# Patient Record
Sex: FEMALE | Race: WHITE | Employment: OTHER | URBAN - NONMETROPOLITAN AREA
[De-identification: names, ages, dates, MRNs, and addresses within clinical notes are randomized per-mention and may not be internally consistent; named-entity substitution may affect disease eponyms.]

---

## 2017-03-07 RX ORDER — LOVASTATIN 20 MG/1
TABLET ORAL
Qty: 180 TABLET | Refills: 1 | Status: SHIPPED | OUTPATIENT
Start: 2017-03-07 | End: 2017-06-20

## 2017-03-21 RX ORDER — LISINOPRIL 2.5 MG/1
TABLET ORAL
Qty: 90 TABLET | Refills: 0 | Status: SHIPPED | OUTPATIENT
Start: 2017-03-21 | End: 2017-06-20 | Stop reason: SDUPTHER

## 2017-03-21 RX ORDER — CLOPIDOGREL BISULFATE 75 MG/1
TABLET ORAL
Qty: 90 TABLET | Refills: 0 | Status: SHIPPED | OUTPATIENT
Start: 2017-03-21 | End: 2017-06-20 | Stop reason: SDUPTHER

## 2017-04-06 ENCOUNTER — OFFICE VISIT (OUTPATIENT)
Dept: FAMILY MEDICINE CLINIC | Age: 82
End: 2017-04-06
Payer: MEDICARE

## 2017-04-06 VITALS
HEART RATE: 60 BPM | BODY MASS INDEX: 26.7 KG/M2 | HEIGHT: 60 IN | WEIGHT: 136 LBS | DIASTOLIC BLOOD PRESSURE: 60 MMHG | RESPIRATION RATE: 16 BRPM | SYSTOLIC BLOOD PRESSURE: 100 MMHG

## 2017-04-06 DIAGNOSIS — Z00.00 MEDICARE ANNUAL WELLNESS VISIT, SUBSEQUENT: ICD-10-CM

## 2017-04-06 DIAGNOSIS — R73.01 IMPAIRED FASTING GLUCOSE: ICD-10-CM

## 2017-04-06 DIAGNOSIS — E78.2 MIXED HYPERLIPIDEMIA: ICD-10-CM

## 2017-04-06 DIAGNOSIS — E03.9 ACQUIRED HYPOTHYROIDISM: ICD-10-CM

## 2017-04-06 DIAGNOSIS — N18.30 CHRONIC KIDNEY DISEASE, STAGE 3 (MODERATE): ICD-10-CM

## 2017-04-06 DIAGNOSIS — I73.9 PERIPHERAL VASCULAR DISEASE (HCC): ICD-10-CM

## 2017-04-06 DIAGNOSIS — I10 ESSENTIAL HYPERTENSION: Primary | ICD-10-CM

## 2017-04-06 DIAGNOSIS — R73.03 PREDIABETES: ICD-10-CM

## 2017-04-06 PROBLEM — N18.9 CHRONIC KIDNEY DISEASE: Status: ACTIVE | Noted: 2017-04-06

## 2017-04-06 PROCEDURE — 1036F TOBACCO NON-USER: CPT | Performed by: FAMILY MEDICINE

## 2017-04-06 PROCEDURE — G8400 PT W/DXA NO RESULTS DOC: HCPCS | Performed by: FAMILY MEDICINE

## 2017-04-06 PROCEDURE — G8427 DOCREV CUR MEDS BY ELIG CLIN: HCPCS | Performed by: FAMILY MEDICINE

## 2017-04-06 PROCEDURE — 4040F PNEUMOC VAC/ADMIN/RCVD: CPT | Performed by: FAMILY MEDICINE

## 2017-04-06 PROCEDURE — 99214 OFFICE O/P EST MOD 30 MIN: CPT | Performed by: FAMILY MEDICINE

## 2017-04-06 PROCEDURE — G8598 ASA/ANTIPLAT THER USED: HCPCS | Performed by: FAMILY MEDICINE

## 2017-04-06 PROCEDURE — G0439 PPPS, SUBSEQ VISIT: HCPCS | Performed by: FAMILY MEDICINE

## 2017-04-06 PROCEDURE — G8420 CALC BMI NORM PARAMETERS: HCPCS | Performed by: FAMILY MEDICINE

## 2017-04-06 PROCEDURE — 1123F ACP DISCUSS/DSCN MKR DOCD: CPT | Performed by: FAMILY MEDICINE

## 2017-04-06 PROCEDURE — 1090F PRES/ABSN URINE INCON ASSESS: CPT | Performed by: FAMILY MEDICINE

## 2017-04-06 RX ORDER — METOPROLOL TARTRATE 50 MG/1
TABLET, FILM COATED ORAL
Qty: 180 TABLET | Refills: 1 | Status: SHIPPED | OUTPATIENT
Start: 2017-04-06 | End: 2017-10-06 | Stop reason: SDUPTHER

## 2017-04-06 RX ORDER — LEVOTHYROXINE SODIUM 0.07 MG/1
TABLET ORAL
Qty: 90 TABLET | Refills: 1 | Status: SHIPPED | OUTPATIENT
Start: 2017-04-06 | End: 2017-10-06 | Stop reason: SDUPTHER

## 2017-04-06 ASSESSMENT — ENCOUNTER SYMPTOMS
VOMITING: 0
RHINORRHEA: 0
CONSTIPATION: 0
SHORTNESS OF BREATH: 0
EYE DISCHARGE: 0
EYE REDNESS: 0
ABDOMINAL PAIN: 0
TROUBLE SWALLOWING: 0
DIARRHEA: 0
SINUS PRESSURE: 0
COUGH: 0
NAUSEA: 0
WHEEZING: 0
SORE THROAT: 0

## 2017-04-06 ASSESSMENT — PATIENT HEALTH QUESTIONNAIRE - PHQ9
2. FEELING DOWN, DEPRESSED OR HOPELESS: 0
1. LITTLE INTEREST OR PLEASURE IN DOING THINGS: 0
SUM OF ALL RESPONSES TO PHQ QUESTIONS 1-9: 0
SUM OF ALL RESPONSES TO PHQ9 QUESTIONS 1 & 2: 0

## 2017-05-25 ENCOUNTER — OFFICE VISIT (OUTPATIENT)
Dept: VASCULAR SURGERY | Age: 82
End: 2017-05-25
Payer: MEDICARE

## 2017-05-25 VITALS
DIASTOLIC BLOOD PRESSURE: 88 MMHG | BODY MASS INDEX: 26.93 KG/M2 | WEIGHT: 137.2 LBS | SYSTOLIC BLOOD PRESSURE: 186 MMHG | HEART RATE: 68 BPM | HEIGHT: 60 IN

## 2017-05-25 DIAGNOSIS — I73.9 PERIPHERAL VASCULAR DISEASE (HCC): ICD-10-CM

## 2017-05-25 DIAGNOSIS — I65.23 CAROTID STENOSIS, BILATERAL: Primary | ICD-10-CM

## 2017-05-25 PROCEDURE — 4040F PNEUMOC VAC/ADMIN/RCVD: CPT | Performed by: SURGERY

## 2017-05-25 PROCEDURE — 1123F ACP DISCUSS/DSCN MKR DOCD: CPT | Performed by: SURGERY

## 2017-05-25 PROCEDURE — G8420 CALC BMI NORM PARAMETERS: HCPCS | Performed by: SURGERY

## 2017-05-25 PROCEDURE — 1036F TOBACCO NON-USER: CPT | Performed by: SURGERY

## 2017-05-25 PROCEDURE — G8427 DOCREV CUR MEDS BY ELIG CLIN: HCPCS | Performed by: SURGERY

## 2017-05-25 PROCEDURE — 1090F PRES/ABSN URINE INCON ASSESS: CPT | Performed by: SURGERY

## 2017-05-25 PROCEDURE — G8400 PT W/DXA NO RESULTS DOC: HCPCS | Performed by: SURGERY

## 2017-05-25 PROCEDURE — 99213 OFFICE O/P EST LOW 20 MIN: CPT | Performed by: SURGERY

## 2017-05-25 PROCEDURE — G8598 ASA/ANTIPLAT THER USED: HCPCS | Performed by: SURGERY

## 2017-06-20 RX ORDER — CLOPIDOGREL BISULFATE 75 MG/1
TABLET ORAL
Qty: 90 TABLET | Refills: 1 | Status: SHIPPED | OUTPATIENT
Start: 2017-06-20 | End: 2017-10-06 | Stop reason: SDUPTHER

## 2017-06-20 RX ORDER — LOVASTATIN 20 MG/1
TABLET ORAL
Qty: 180 TABLET | Refills: 0 | Status: SHIPPED | OUTPATIENT
Start: 2017-06-20 | End: 2017-10-06 | Stop reason: SDUPTHER

## 2017-06-20 RX ORDER — LISINOPRIL 2.5 MG/1
TABLET ORAL
Qty: 90 TABLET | Refills: 1 | Status: SHIPPED | OUTPATIENT
Start: 2017-06-20 | End: 2017-10-06 | Stop reason: SDUPTHER

## 2017-09-29 ENCOUNTER — HOSPITAL ENCOUNTER (OUTPATIENT)
Dept: LAB | Age: 82
Setting detail: SPECIMEN
Discharge: HOME | End: 2017-09-29
Payer: MEDICARE

## 2017-09-29 DIAGNOSIS — E78.2 MIXED HYPERLIPIDEMIA: ICD-10-CM

## 2017-09-29 DIAGNOSIS — E03.9 ACQUIRED HYPOTHYROIDISM: ICD-10-CM

## 2017-09-29 DIAGNOSIS — I10 ESSENTIAL HYPERTENSION: ICD-10-CM

## 2017-09-29 DIAGNOSIS — R73.01 IMPAIRED FASTING GLUCOSE: ICD-10-CM

## 2017-09-29 LAB
ABSOLUTE EOS #: 0.3 K/UL (ref 0–0.4)
ABSOLUTE LYMPH #: 2.5 K/UL (ref 1–4.8)
ABSOLUTE MONO #: 0.6 K/UL (ref 0.1–1.2)
ALBUMIN SERPL-MCNC: 4 G/DL (ref 3.5–5.2)
ALBUMIN/GLOBULIN RATIO: 1.1 (ref 1–2.5)
ALP BLD-CCNC: 86 U/L (ref 35–104)
ALT SERPL-CCNC: 41 U/L (ref 5–33)
ANION GAP SERPL CALCULATED.3IONS-SCNC: 14 MMOL/L (ref 9–17)
AST SERPL-CCNC: 54 U/L
BASOPHILS # BLD: 1 % (ref 0–1)
BASOPHILS ABSOLUTE: 0.1 K/UL (ref 0–0.2)
BILIRUB SERPL-MCNC: 0.48 MG/DL (ref 0.3–1.2)
BUN BLDV-MCNC: 22 MG/DL (ref 8–23)
BUN/CREAT BLD: 21 (ref 9–20)
CALCIUM SERPL-MCNC: 9.5 MG/DL (ref 8.6–10.4)
CHLORIDE BLD-SCNC: 106 MMOL/L (ref 98–107)
CHOLESTEROL/HDL RATIO: 2.8
CHOLESTEROL: 162 MG/DL
CO2: 26 MMOL/L (ref 20–31)
CREAT SERPL-MCNC: 1.07 MG/DL (ref 0.5–0.9)
DIFFERENTIAL TYPE: NORMAL
EOSINOPHILS RELATIVE PERCENT: 4 % (ref 1–7)
ESTIMATED AVERAGE GLUCOSE: 100 MG/DL
GFR AFRICAN AMERICAN: 60 ML/MIN
GFR NON-AFRICAN AMERICAN: 49 ML/MIN
GFR SERPL CREATININE-BSD FRML MDRD: ABNORMAL ML/MIN/{1.73_M2}
GFR SERPL CREATININE-BSD FRML MDRD: ABNORMAL ML/MIN/{1.73_M2}
GLUCOSE BLD-MCNC: 101 MG/DL (ref 70–99)
HBA1C MFR BLD: 5.1 % (ref 4.8–5.9)
HCT VFR BLD CALC: 43.8 % (ref 36–46)
HDLC SERPL-MCNC: 58 MG/DL
HEMOGLOBIN: 14.2 G/DL (ref 12–16)
LDL CHOLESTEROL: 83 MG/DL (ref 0–130)
LYMPHOCYTES # BLD: 31 % (ref 16–46)
MCH RBC QN AUTO: 30.3 PG (ref 26–34)
MCHC RBC AUTO-ENTMCNC: 32.5 G/DL (ref 31–37)
MCV RBC AUTO: 93.2 FL (ref 80–100)
MONOCYTES # BLD: 7 % (ref 4–11)
PDW BLD-RTO: 13 % (ref 11–14.5)
PLATELET # BLD: 259 K/UL (ref 140–450)
PLATELET ESTIMATE: NORMAL
PMV BLD AUTO: 8.1 FL (ref 6–12)
POTASSIUM SERPL-SCNC: 4.4 MMOL/L (ref 3.7–5.3)
RBC # BLD: 4.7 M/UL (ref 4–5.2)
RBC # BLD: NORMAL 10*6/UL
SEG NEUTROPHILS: 57 % (ref 43–77)
SEGMENTED NEUTROPHILS ABSOLUTE COUNT: 4.6 K/UL (ref 1.8–7.7)
SODIUM BLD-SCNC: 146 MMOL/L (ref 135–144)
THYROXINE, FREE: 1.59 NG/DL (ref 0.93–1.7)
TOTAL PROTEIN: 7.7 G/DL (ref 6.4–8.3)
TRIGL SERPL-MCNC: 104 MG/DL
TSH SERPL DL<=0.05 MIU/L-ACNC: 0.59 MIU/L (ref 0.3–5)
VLDLC SERPL CALC-MCNC: NORMAL MG/DL (ref 1–30)
WBC # BLD: 8.1 K/UL (ref 3.5–11)
WBC # BLD: NORMAL 10*3/UL

## 2017-09-29 PROCEDURE — 80061 LIPID PANEL: CPT

## 2017-09-29 PROCEDURE — 84439 ASSAY OF FREE THYROXINE: CPT

## 2017-09-29 PROCEDURE — 85025 COMPLETE CBC W/AUTO DIFF WBC: CPT

## 2017-09-29 PROCEDURE — 36415 COLL VENOUS BLD VENIPUNCTURE: CPT

## 2017-09-29 PROCEDURE — 84443 ASSAY THYROID STIM HORMONE: CPT

## 2017-09-29 PROCEDURE — 80053 COMPREHEN METABOLIC PANEL: CPT

## 2017-09-29 PROCEDURE — 83036 HEMOGLOBIN GLYCOSYLATED A1C: CPT

## 2017-10-06 ENCOUNTER — OFFICE VISIT (OUTPATIENT)
Dept: FAMILY MEDICINE CLINIC | Age: 82
End: 2017-10-06
Payer: MEDICARE

## 2017-10-06 VITALS
BODY MASS INDEX: 26.61 KG/M2 | DIASTOLIC BLOOD PRESSURE: 70 MMHG | HEIGHT: 59 IN | SYSTOLIC BLOOD PRESSURE: 130 MMHG | HEART RATE: 64 BPM | WEIGHT: 132 LBS | RESPIRATION RATE: 16 BRPM

## 2017-10-06 DIAGNOSIS — E78.2 MIXED HYPERLIPIDEMIA: ICD-10-CM

## 2017-10-06 DIAGNOSIS — I73.9 PERIPHERAL VASCULAR DISEASE (HCC): ICD-10-CM

## 2017-10-06 DIAGNOSIS — R73.01 IMPAIRED FASTING GLUCOSE: ICD-10-CM

## 2017-10-06 DIAGNOSIS — E03.9 ACQUIRED HYPOTHYROIDISM: ICD-10-CM

## 2017-10-06 DIAGNOSIS — I10 ESSENTIAL HYPERTENSION: Primary | ICD-10-CM

## 2017-10-06 DIAGNOSIS — N18.30 CHRONIC KIDNEY DISEASE, STAGE 3 (MODERATE): ICD-10-CM

## 2017-10-06 PROCEDURE — G8417 CALC BMI ABV UP PARAM F/U: HCPCS | Performed by: FAMILY MEDICINE

## 2017-10-06 PROCEDURE — G8484 FLU IMMUNIZE NO ADMIN: HCPCS | Performed by: FAMILY MEDICINE

## 2017-10-06 PROCEDURE — 99214 OFFICE O/P EST MOD 30 MIN: CPT | Performed by: FAMILY MEDICINE

## 2017-10-06 PROCEDURE — 1090F PRES/ABSN URINE INCON ASSESS: CPT | Performed by: FAMILY MEDICINE

## 2017-10-06 PROCEDURE — G8598 ASA/ANTIPLAT THER USED: HCPCS | Performed by: FAMILY MEDICINE

## 2017-10-06 PROCEDURE — 1036F TOBACCO NON-USER: CPT | Performed by: FAMILY MEDICINE

## 2017-10-06 PROCEDURE — G8400 PT W/DXA NO RESULTS DOC: HCPCS | Performed by: FAMILY MEDICINE

## 2017-10-06 PROCEDURE — 4040F PNEUMOC VAC/ADMIN/RCVD: CPT | Performed by: FAMILY MEDICINE

## 2017-10-06 PROCEDURE — G8427 DOCREV CUR MEDS BY ELIG CLIN: HCPCS | Performed by: FAMILY MEDICINE

## 2017-10-06 PROCEDURE — 1123F ACP DISCUSS/DSCN MKR DOCD: CPT | Performed by: FAMILY MEDICINE

## 2017-10-06 RX ORDER — LOPERAMIDE HYDROCHLORIDE 2 MG/1
2 CAPSULE ORAL 4 TIMES DAILY PRN
COMMUNITY
End: 2019-04-11

## 2017-10-06 RX ORDER — LOVASTATIN 20 MG/1
TABLET ORAL
Qty: 180 TABLET | Refills: 0 | Status: SHIPPED | OUTPATIENT
Start: 2017-10-06 | End: 2018-03-05 | Stop reason: SDUPTHER

## 2017-10-06 RX ORDER — CLOPIDOGREL BISULFATE 75 MG/1
TABLET ORAL
Qty: 90 TABLET | Refills: 1 | Status: SHIPPED | OUTPATIENT
Start: 2017-10-06 | End: 2018-03-05 | Stop reason: SDUPTHER

## 2017-10-06 RX ORDER — METOPROLOL TARTRATE 50 MG/1
TABLET, FILM COATED ORAL
Qty: 180 TABLET | Refills: 1 | Status: SHIPPED | OUTPATIENT
Start: 2017-10-06 | End: 2018-03-05 | Stop reason: SDUPTHER

## 2017-10-06 RX ORDER — LISINOPRIL 2.5 MG/1
TABLET ORAL
Qty: 90 TABLET | Refills: 1 | Status: SHIPPED | OUTPATIENT
Start: 2017-10-06 | End: 2018-03-05 | Stop reason: SDUPTHER

## 2017-10-06 RX ORDER — LEVOTHYROXINE SODIUM 0.07 MG/1
TABLET ORAL
Qty: 90 TABLET | Refills: 1 | Status: SHIPPED | OUTPATIENT
Start: 2017-10-06 | End: 2018-03-05 | Stop reason: SDUPTHER

## 2017-10-06 ASSESSMENT — ENCOUNTER SYMPTOMS
NAUSEA: 0
COUGH: 0
EYE REDNESS: 0
EYE DISCHARGE: 0
RHINORRHEA: 0
TROUBLE SWALLOWING: 0
SORE THROAT: 0
WHEEZING: 0
ABDOMINAL PAIN: 0
CONSTIPATION: 0
SHORTNESS OF BREATH: 0
VOMITING: 0
DIARRHEA: 0
SINUS PRESSURE: 0

## 2017-10-06 NOTE — PATIENT INSTRUCTIONS
Hospital Outpatient Visit on 09/29/2017   Component Date Value Ref Range Status    WBC 09/29/2017 8.1  3.5 - 11.0 k/uL Final    RBC 09/29/2017 4.70  4.0 - 5.2 m/uL Final    Hemoglobin 09/29/2017 14.2  12.0 - 16.0 g/dL Final    Hematocrit 09/29/2017 43.8  36 - 46 % Final    MCV 09/29/2017 93.2  80 - 100 fL Final    MCH 09/29/2017 30.3  26 - 34 pg Final    MCHC 09/29/2017 32.5  31 - 37 g/dL Final    RDW 09/29/2017 13.0  11.0 - 14.5 % Final    Platelets 98/09/8060 259  140 - 450 k/uL Final    MPV 09/29/2017 8.1  6.0 - 12.0 fL Final    Differential Type 09/29/2017 NOT REPORTED   Final    WBC Morphology 09/29/2017 NOT REPORTED   Final    RBC Morphology 09/29/2017 NOT REPORTED   Final    Platelet Estimate 53/[birth year]/1949 NOT REPORTED   Final    Seg Neutrophils 09/29/2017 57  43 - 77 % Final    Lymphocytes 09/29/2017 31  16 - 46 % Final    Monocytes 09/29/2017 7  4 - 11 % Final    Eosinophils % 09/29/2017 4  1 - 7 % Final    Basophils 09/29/2017 1  0 - 1 % Final    Segs Absolute 09/29/2017 4.60  1.8 - 7.7 k/uL Final    Absolute Lymph # 09/29/2017 2.50  1.0 - 4.8 k/uL Final    Absolute Mono # 09/29/2017 0.60  0.1 - 1.2 k/uL Final    Absolute Eos # 09/29/2017 0.30  0.0 - 0.4 k/uL Final    Basophils # 09/29/2017 0.10  0.0 - 0.2 k/uL Final    Comment: Performed at [facility] [address] [address] [phone]      Glucose 09/29/2017 101* 70 - 99 mg/dL Final    BUN 09/29/2017 22  8 - 23 mg/dL Final    CREATININE 09/29/2017 1.07* 0.50 - 0.90 mg/dL Final    Bun/Cre Ratio 09/29/2017 21* 9 - 20 Final    Calcium 09/29/2017 9.5  8.6 - 10.4 mg/dL Final    Sodium 09/29/2017 146* 135 - 144 mmol/L Final    Potassium 09/29/2017 4.4  3.7 - 5.3 mmol/L Final    Chloride 09/29/2017 106  98 - 107 mmol/L Final    CO2 09/29/2017 26  20 - 31 mmol/L Final    Anion Gap 09/29/2017 14  9 - 17 mmol/L Final    Alkaline Phosphatase 09/29/2017 86  35 - 104 U/L Final High     >499   Very high   Based on AHA Guidelines for fasting triglyceride, October 2012. Performed at [facility] [address] [address] [phone]      VLDL 09/29/2017 NOT REPORTED  1 - 30 mg/dL Final    TSH 09/29/2017 0.59  0.30 - 5.00 mIU/L Final    Comment: Performed at [facility] [address] [address] [phone]      Thyroxine, Free 09/29/2017 1.59  0.93 - 1.70 ng/dL Final    Comment: Performed at [facility] [address] [id number] [phone]            Learning About High Blood Pressure  What is high blood pressure? Blood pressure is a measure of how hard the blood pushes against the walls of your arteries. It's normal for blood pressure to go up and down throughout the day, but if it stays up, you have high blood pressure. Another name for high blood pressure is hypertension. Two numbers tell you your blood pressure. The first number is the systolic pressure. It shows how hard the blood pushes when your heart is pumping. The second number is the diastolic pressure. It shows how hard the blood pushes between heartbeats, when your heart is relaxed and filling with blood. A blood pressure of less than 120/80 (say \"120 over 80\") is ideal for an adult. High blood pressure is 140/90 or higher. You have high blood pressure if your top number is 140 or higher or your bottom number is 90 or higher, or both. Many people fall into the category in between, called prehypertension. People with prehypertension need to make lifestyle changes to bring their blood pressure down and help prevent or delay high blood pressure. What happens when you have high blood pressure? · Blood flows through your arteries with too much force. Over time, this damages the walls of your arteries. But you can't feel it.  High blood pressure usually

## 2017-10-06 NOTE — PROGRESS NOTES
Subjective:      Patient ID: Maria G Malave is a [de-identified] y.o. female. HPI  Patient comes in today for follow up of chronic health issues   Chief Complaint   Patient presents with    Hypertension     6 mo f/u; planned visit    Hyperlipidemia     6 mo f/u    Hypothyroidism     6 mo f/u    Blood Sugar Problem     IFG; 6 mo f/u    Chronic Kidney Disease     stage 3; 6 mo f/u    Other     PVD; 6 mo f/u    Discuss Labs     drawn 9/29/17   . Patient Feels relatively well overall. She does not have any acute concerns to discuss today. Her blood pressure is stable and controlled on her current medical therapy. Her hyperlipidemia is stable and controlled on her current statin dose. Blood sugar remains mildly elevated, but her hemoglobin A1c is within normal limits. She does have known chronic kidney disease, but her creatinine actually stable and low but improved compared to last check. Thyroid levels are stable and adequately supplemented on her current thyroid dose. She does have known peripheral vascular disease but is not having any significant symptoms of pain or discomfort in her lower extremities. Otherwise today no other acute medical concerns.   The rest of her labs are essentially normal..  Patient's recent lab reports are as follows:    Results for orders placed or performed during the hospital encounter of 09/29/17   CBC Auto Differential   Result Value Ref Range    WBC 8.1 3.5 - 11.0 k/uL    RBC 4.70 4.0 - 5.2 m/uL    Hemoglobin 14.2 12.0 - 16.0 g/dL    Hematocrit 43.8 36 - 46 %    MCV 93.2 80 - 100 fL    MCH 30.3 26 - 34 pg    MCHC 32.5 31 - 37 g/dL    RDW 13.0 11.0 - 14.5 %    Platelets 100 645 - 121 k/uL    MPV 8.1 6.0 - 12.0 fL    Differential Type NOT REPORTED     WBC Morphology NOT REPORTED     RBC Morphology NOT REPORTED     Platelet Estimate NOT REPORTED     Seg Neutrophils 57 43 - 77 %    Lymphocytes 31 16 - 46 %    Monocytes 7 4 - 11 %    Eosinophils % 4 1 - 7 %    Basophils 1 0 - 1 % Segs Absolute 4.60 1.8 - 7.7 k/uL    Absolute Lymph # 2.50 1.0 - 4.8 k/uL    Absolute Mono # 0.60 0.1 - 1.2 k/uL    Absolute Eos # 0.30 0.0 - 0.4 k/uL    Basophils # 0.10 0.0 - 0.2 k/uL   Comprehensive Metabolic Panel   Result Value Ref Range    Glucose 101 (H) 70 - 99 mg/dL    BUN 22 8 - 23 mg/dL    CREATININE 1.07 (H) 0.50 - 0.90 mg/dL    Bun/Cre Ratio 21 (H) 9 - 20    Calcium 9.5 8.6 - 10.4 mg/dL    Sodium 146 (H) 135 - 144 mmol/L    Potassium 4.4 3.7 - 5.3 mmol/L    Chloride 106 98 - 107 mmol/L    CO2 26 20 - 31 mmol/L    Anion Gap 14 9 - 17 mmol/L    Alkaline Phosphatase 86 35 - 104 U/L    ALT 41 (H) 5 - 33 U/L    AST 54 (H) <32 U/L    Total Bilirubin 0.48 0.3 - 1.2 mg/dL    Total Protein 7.7 6.4 - 8.3 g/dL    Alb 4.0 3.5 - 5.2 g/dL    Albumin/Globulin Ratio 1.1 1.0 - 2.5    GFR Non- 49 (L) >60 mL/min    GFR African American 60 (L) >60 mL/min    GFR Comment          GFR Staging NOT REPORTED    Hemoglobin A1C   Result Value Ref Range    Hemoglobin A1C 5.1 4.8 - 5.9 %    Estimated Avg Glucose 100 mg/dL   Lipid Panel   Result Value Ref Range    Cholesterol 162 <200 mg/dL    HDL 58 >40 mg/dL    LDL Cholesterol 83 0 - 130 mg/dL    Chol/HDL Ratio 2.8 <5    Triglycerides 104 <150 mg/dL    VLDL NOT REPORTED 1 - 30 mg/dL   TSH without Reflex   Result Value Ref Range    TSH 0.59 0.30 - 5.00 mIU/L   T4, Free   Result Value Ref Range    Thyroxine, Free 1.59 0.93 - 1.70 ng/dL     Did discuss dietary modification and increased exercise to keep cholesterol and blood sugar under good control. Other review of systems are as noted below. Did review patient's med list, allergies, social history, fam history, pmhx and pshx today as noted in the record. Preventative measures are reviewed today. See health maintenance section for complete preventative plan of care. Review of Systems   Constitutional: Negative for chills, fatigue and fever.    HENT: Negative for congestion, ear pain, postnasal drip, rhinorrhea, sinus pressure, sore throat and trouble swallowing. Eyes: Negative for discharge and redness. Respiratory: Negative for cough, shortness of breath and wheezing. Cardiovascular: Negative for chest pain. Gastrointestinal: Negative for abdominal pain, constipation, diarrhea, nausea and vomiting. Musculoskeletal: Negative for arthralgias, myalgias and neck pain. Skin: Negative for rash and wound. Allergic/Immunologic: Negative for environmental allergies. Neurological: Negative for dizziness, weakness, light-headedness and headaches. Hematological: Negative for adenopathy. Psychiatric/Behavioral: Negative. Objective:   Physical Exam   Constitutional: She is oriented to person, place, and time. She appears well-developed and well-nourished. No distress. HENT:   Head: Normocephalic and atraumatic. Right Ear: External ear normal.   Left Ear: External ear normal.   Nose: Nose normal.   Mouth/Throat: Oropharynx is clear and moist. No oropharyngeal exudate. Eyes: Conjunctivae and EOM are normal. Pupils are equal, round, and reactive to light. Right eye exhibits no discharge. Left eye exhibits no discharge. Neck: Normal range of motion. Neck supple. No thyromegaly present. Cardiovascular: Normal rate, regular rhythm and normal heart sounds. Pulmonary/Chest: Effort normal and breath sounds normal. She has no wheezes. She has no rales. Abdominal: Soft. Bowel sounds are normal.   Musculoskeletal: She exhibits no edema. Lymphadenopathy:     She has no cervical adenopathy. Neurological: She is alert and oriented to person, place, and time. Skin: Skin is warm and dry. No rash noted. She is not diaphoretic. Psychiatric: She has a normal mood and affect. Her behavior is normal. Judgment and thought content normal.   Nursing note and vitals reviewed. Assessment:      Encounter Diagnoses   Name Primary?     Essential hypertension Yes    Mixed hyperlipidemia     Acquired hypothyroidism     Chronic kidney disease, stage 3 (moderate)     Peripheral vascular disease (HCC)     Impaired fasting glucose            Plan:      Orders Placed This Encounter   Medications    metoprolol tartrate (LOPRESSOR) 50 MG tablet     Sig: TAKE ONE TABLET BY MOUTH TWICE DAILY     Dispense:  180 tablet     Refill:  1    levothyroxine (SYNTHROID) 75 MCG tablet     Sig: TAKE ONE TABLET BY MOUTH ONCE DAILY     Dispense:  90 tablet     Refill:  1    lisinopril (PRINIVIL;ZESTRIL) 2.5 MG tablet     Sig: TAKE ONE TABLET BY MOUTH ONCE DAILY     Dispense:  90 tablet     Refill:  1    clopidogrel (PLAVIX) 75 MG tablet     Sig: TAKE ONE TABLET BY MOUTH ONCE DAILY     Dispense:  90 tablet     Refill:  1    lovastatin (MEVACOR) 20 MG tablet     Sig: TAKE TWO TABLETS BY MOUTH NIGHTLY     Dispense:  180 tablet     Refill:  0    metFORMIN (GLUCOPHAGE) 500 MG tablet     Sig: TAKE ONE TABLET BY MOUTH ONCE DAILY WITH BREAKFAST     Dispense:  90 tablet     Refill:  1     Orders Placed This Encounter   Procedures    CBC Auto Differential     To be done in 6 months     Standing Status:   Future     Standing Expiration Date:   10/6/2018    Comprehensive Metabolic Panel     To be done in 6 months     Standing Status:   Future     Standing Expiration Date:   10/6/2018    Lipid Panel     To be done in 6 months     Standing Status:   Future     Standing Expiration Date:   10/6/2018     Order Specific Question:   Is Patient Fasting?/# of Hours     Answer:   12 hours    Hemoglobin A1C     To be done in 6 months     Standing Status:   Future     Standing Expiration Date:   10/6/2018    TSH without Reflex     To be done in 6 months     Standing Status:   Future     Standing Expiration Date:   10/6/2018    T4, Free     To be done in 6 months     Standing Status:   Future     Standing Expiration Date:   10/6/2018     Patient is to continue on her current medical regimen.   No additional changes are made at this time.    Patient is to follow a low-carb/low sugar diet and increase exercise for optimal blood sugar and cholesterol control. Patient is to return to my office in 6 months duration or sooner if any further problems or symptoms arise.     (Please note that portions of this note were completed with a voice recognition program. Efforts were made to edit the dictations but occasionally words are mis-transcribed.)

## 2017-10-06 NOTE — MR AVS SNAPSHOT
After Visit Summary             Victor Manuel Pedroza   10/6/2017 9:40 AM   Office Visit    Description:  Female : 1935   Provider:  Eric Sinclair DO   Department:  [address] and Future Appointments         Below is a list of your follow-up and future appointments. This may not be a complete list as you may have made appointments directly with providers that we are not aware of or your providers may have made some for you. Please call your providers to confirm appointments. It is important to keep your appointments. Please bring your current insurance card, photo ID, co-pay, and all medication bottles to your appointment. If self-pay, payment is expected at the time of service. Your To-Do List     Future Appointments Provider Department Dept Phone    2018 8:15 AM SCHEDULE, [facility]  Laboratory [phone]    If this is a fasting lab, please do not eat or drink past midnight other than water. 2018 10:40 AM Eric Sinclair DO [facility] [phone]    Future Orders Complete By Expires    CBC Auto Differential [TFJ3277 Custom]  2018 10/6/2018    Comments: To be done in 6 months    Comprehensive Metabolic Panel [VZD07 Custom]  2018 10/6/2018    Comments: To be done in 6 months    Hemoglobin A1C [LAB90 Custom]  2018 10/6/2018    Comments: To be done in 6 months    Lipid Panel [LAB18 Custom]  2018 10/6/2018    Comments: To be done in 6 months    T4, Free [GZE659 Custom]  2018 10/6/2018    Comments: To be done in 6 months    TSH without Reflex [[id number] Custom]  2018 10/6/2018    Comments: To be done in 6 months    Follow-Up    Return in about 6 months (around 2018).          Information from Your Visit        Department     Name Address Phone Fax    [address] [place] [phone]      You Were Seen for:         Comments Essential hypertension   [270411]         Vital Signs     Blood Pressure Pulse Respirations Height Weight Last Menstrual Period    130/70 (Site: Left Arm, Position: Sitting, Cuff Size: Medium Adult) 64 16 4' 11\" (1.499 m) 132 lb (59.9 kg) 01/01/1972    Body Mass Index Smoking Status                26.66 kg/m2 Former Smoker          Additional Information about your Body Mass Index (BMI)           Your BMI as listed above is considered overweight (25.0-29.9). BMI is an estimate of body fat, calculated from your height and weight. The higher your BMI, the greater your risk of heart disease, high blood pressure, type 2 diabetes, stroke, gallstones, arthritis, sleep apnea, and certain cancers. BMI is not perfect. It may overestimate body fat in athletes and people who are more muscular. If your body fat is high you can improve your BMI by decreasing your calorie intake and becoming more physically active.      Learn more at: [employer] Command.co.uk          [facility]    Hospital Outpatient Visit on 09/29/2017   Component Date Value Ref Range Status    WBC 09/29/2017 8.1  3.5 - 11.0 k/uL Final    RBC 09/29/2017 4.70  4.0 - 5.2 m/uL Final    Hemoglobin 09/29/2017 14.2  12.0 - 16.0 g/dL Final    Hematocrit 09/29/2017 43.8  36 - 46 % Final    MCV 09/29/2017 93.2  80 - 100 fL Final    MCH 09/29/2017 30.3  26 - 34 pg Final    MCHC 09/29/2017 32.5  31 - 37 g/dL Final    RDW 09/29/2017 13.0  11.0 - 14.5 % Final    Platelets 91/91/9339 259  140 - 450 k/uL Final    MPV 09/29/2017 8.1  6.0 - 12.0 fL Final    Differential Type 09/29/2017 NOT REPORTED   Final    WBC Morphology 09/29/2017 NOT REPORTED   Final    RBC Morphology 09/29/2017 NOT REPORTED   Final    Platelet Estimate 42/38/3752 NOT REPORTED   Final    Seg Neutrophils 09/29/2017 57  43 - 77 % Final    Lymphocytes 09/29/2017 31  16 - 46 % Final    Monocytes 09/29/2017 7  4 - 11 % Final  GFR Staging 09/29/2017 NOT REPORTED   Final    Hemoglobin A1C 09/29/2017 5.1  4.8 - 5.9 % Final    Estimated Avg Glucose 09/29/2017 100  mg/dL Final    Comment: Performed at [facility] [address] [id number] [phone]      Cholesterol 09/29/2017 162  <200 mg/dL Final    Comment:    Cholesterol Guidelines:      <200  Desirable   200-240  Borderline      >240  Undesirable         HDL 09/29/2017 58  >40 mg/dL Final    Comment:    HDL Guidelines:    <40     Undesirable   40-59    Borderline    >59     Desirable         LDL Cholesterol 09/29/2017 83  0 - 130 mg/dL Final    Comment:    LDL Guidelines:     <100    Desirable   100-129   Near to/above Desirable   130-159   Borderline      >159   Undesirable     Direct (measured) LDL and calculated LDL are not interchangeable tests.  Chol/HDL Ratio 09/29/2017 2.8  <5 Final            Triglycerides 09/29/2017 104  <150 mg/dL Final    Comment:    Triglyceride Guidelines:     <150   Desirable   150-199  Borderline   200-499  High     >499   Very high   Based on AHA Guidelines for fasting triglyceride, October 2012. Performed at [facility] [address] [address] [phone]      VLDL 09/29/2017 NOT REPORTED  1 - 30 mg/dL Final    TSH 09/29/2017 0.59  0.30 - 5.00 mIU/L Final    Comment: Performed at [facility] [address] [address] [phone]      Thyroxine, Free 09/29/2017 1.59  0.93 - 1.70 ng/dL Final    Comment: Performed at [facility] [address] [address] [phone]            Learning About High Blood Pressure  What is high blood pressure? Blood pressure is a measure of how hard the blood pushes against the walls of your arteries.  It's normal for blood pressure to go up and down ¨ Flavor your food with garlic, lemon juice, onion, vinegar, herbs, and spices instead of salt. Do not use soy sauce, steak sauce, onion salt, garlic salt, mustard, or ketchup on your food. ¨ Use less salt (or none) when recipes call for it. You can often use half the salt a recipe calls for without losing flavor. · Be physically active. Get at least 30 minutes of exercise on most days of the week. Walking is a good choice. You also may want to do other activities, such as running, swimming, cycling, or playing tennis or team sports. · Limit alcohol to 2 drinks a day for men and 1 drink a day for women. · Eat plenty of fruits, vegetables, and low-fat dairy products. Eat less saturated and total fats. How is high blood pressure treated? · Your doctor will suggest making lifestyle changes. For example, your doctor may ask you to eat healthy foods, quit smoking, lose extra weight, and be more active. · If lifestyle changes don't help enough or your blood pressure is very high, you will have to take medicine every day. Follow-up care is a key part of your treatment and safety. Be sure to make and go to all appointments, and call your doctor if you are having problems. It's also a good idea to know your test results and keep a list of the medicines you take. Where can you learn more? Go to [email or web address] and sign in to your [employer] account. Enter P501 in the [facility] box to learn more about \"Learning About High Blood Pressure. \"     If you do not have an account, please click on the \"Sign Up Now\" link. Current as of: March 23, 2016  Content Version: 11.3  © 3260-0823 [employer], [employer]. Care instructions adapted under license by [facility] ([facility]). If you have questions about a medical condition or this instruction, always ask your healthcare professional. [address] any warranty or liability for your use of this information. Today's Medication Changes          These changes are accurate as of: 10/6/17 10:50 AM.  If you have any questions, ask your nurse or doctor. CHANGE how you take these medications           clopidogrel 75 MG tablet   Commonly known as:  PLAVIX   Instructions:  TAKE ONE TABLET BY MOUTH ONCE DAILY   Quantity:  90 tablet   Refills:  1   What changed:  See the new instructions. Changed by:  Lisandro Aldrich DO       lisinopril 2.5 MG tablet   Commonly known as:  PRINIVIL;ZESTRIL   Instructions:  TAKE ONE TABLET BY MOUTH ONCE DAILY   Quantity:  90 tablet   Refills:  1   What changed:  See the new instructions. Changed by:  Lisandro Aldrich DO       lovastatin 20 MG tablet   Commonly known as:  MEVACOR   Instructions:  TAKE TWO TABLETS BY MOUTH NIGHTLY   Quantity:  180 tablet   Refills:  0   What changed:  See the new instructions. Changed by:  Lisandro Aldrich DO       metFORMIN 500 MG tablet   Commonly known as:  GLUCOPHAGE   Instructions:  TAKE ONE TABLET BY MOUTH ONCE DAILY WITH BREAKFAST   Quantity:  90 tablet   Refills:  1   What changed:  See the new instructions.    Changed by:  Lisandro Aldrich DO            Where to Get Your Medications      These medications were sent to [address] [place] [address] F [phone] [address], [address] [facility] [address]     Phone:  [phone]     clopidogrel 75 MG tablet    levothyroxine 75 MCG tablet    lisinopril 2.5 MG tablet    lovastatin 20 MG tablet    metFORMIN 500 MG tablet    metoprolol tartrate 50 MG tablet               Your Current Medications Are              loperamide (IMODIUM) 2 MG capsule Take 2 mg by mouth 4 times daily as needed for Diarrhea    metoprolol tartrate (LOPRESSOR) 50 MG tablet TAKE ONE TABLET BY MOUTH TWICE DAILY    levothyroxine (SYNTHROID) 75 MCG tablet TAKE ONE TABLET BY MOUTH ONCE DAILY lisinopril (PRINIVIL;ZESTRIL) 2.5 MG tablet TAKE ONE TABLET BY MOUTH ONCE DAILY    clopidogrel (PLAVIX) 75 MG tablet TAKE ONE TABLET BY MOUTH ONCE DAILY    lovastatin (MEVACOR) 20 MG tablet TAKE TWO TABLETS BY MOUTH NIGHTLY    metFORMIN (GLUCOPHAGE) 500 MG tablet TAKE ONE TABLET BY MOUTH ONCE DAILY WITH BREAKFAST    Tetrahydroz-Dextran-PEG-Povid (EYE DROPS ADVANCED RELIEF OP) Place 1 drop into both eyes 2 times daily    Calcium Carbonate-Vitamin D (CALCIUM 600 + D PO) Take 1 tablet by mouth 2 times daily. aspirin 325 MG EC tablet Take 325 mg by mouth daily. Multiple Vitamins-Minerals (MULTIVITAMIN PO) Take 1 tablet by mouth daily. Multivitamin with iron      Allergies              Other     She did have an IV antibiotic while she was in the hospital that she had a rash from, but son is unsure of the name of that. Otherwise, she has no known allergies. Additional Information        Basic Information     Date Of Birth Sex Race Ethnicity Preferred Language    1935 Female White Non-/Non  English      Problem List as of 10/6/2017  Date Reviewed: 10/6/2017                Impaired fasting glucose    Chronic kidney disease    Peripheral vascular disease ([facility] [address]    Medicare annual wellness visit, subsequent    Carotid stenosis, bilateral    Cerebral infarction ([facility] [address]    Prediabetes    HTN (hypertension)    Hyperlipidemia    Hypothyroidism      Your Goals as of 10/6/2017 at 10:49 AM              Today    5/25/17 4/6/17       Blood Pressure    Blood Pressure < 140/90 (pt-stated)   130/70  186/88  100/60    Notes    Patient Self-Management Goal for Health Maintenance  Goal: I will follow a healthy diet as discussed by my provider.   Barriers: none  Plan for overcoming my barriers: N/A  Confidence: 8/10  Anticipated Goal Completion Date: 6 mo         Immunizations as of 10/6/2017     Name Date    Pneumococcal 13-valent Conjugate Steffen Arango) 10/10/2014 Pneumococcal Polysaccharide ([id number]) 10/10/2013      Preventive Care        Date Due    Tetanus Combination Vaccine (1 - Tdap) 3/6/1954    Yearly Flu Vaccine (1) 9/1/2017    Mammograms are recommended every 2 years for low/average risk patients aged 48 - 69, and every year for high risk patients per updated national guidelines. However these guidelines can be individualized by your provider. 10/8/2017            MyChart Signup           [employer] allows you to send messages to your doctor, view your test results, renew your prescriptions, schedule appointments, view visit notes, and more. How Do I Sign Up? 1. In your Internet browser, go to [email or web address]  2. Click on the Sign Up Now link in the Sign In box. You will see the New Member Sign Up page. 3. Enter your Start[employer] Access Code exactly as it appears below. You will not need to use this code after youve completed the sign-up process. If you do not sign up before the expiration date, you must request a new code. Starteed Access Code: [email or web address]  Expires: 12/5/2017 10:50 AM    4. Enter your Social Security Number ([id number]) and Date of Birth (mm/dd/yyyy) as indicated and click Submit. You will be taken to the next sign-up page. 5. Create a Starteed ID. This will be your Starteed login ID and cannot be changed, so think of one that is secure and easy to remember. 6. Create a Starteed password. You can change your password at any time. 7. Enter your Password Reset Question and Answer. This can be used at a later time if you forget your password. 8. Enter your e-mail address. You will receive e-mail notification when new information is available in [address] 9. Click Sign Up. You can now view your medical record. Additional Information  If you have questions, please contact the physician practice where you receive care. Remember, Starteed is NOT to be used for urgent needs. For medical emergencies, dial 911.

## 2018-03-04 RX ORDER — LOVASTATIN 20 MG/1
TABLET ORAL
Qty: 180 TABLET | Refills: 0 | Status: CANCELLED | OUTPATIENT
Start: 2018-03-04

## 2018-03-05 RX ORDER — METOPROLOL TARTRATE 50 MG/1
TABLET, FILM COATED ORAL
Qty: 180 TABLET | Refills: 1 | Status: SHIPPED | OUTPATIENT
Start: 2018-03-05 | End: 2018-10-10 | Stop reason: SDUPTHER

## 2018-03-05 RX ORDER — LEVOTHYROXINE SODIUM 0.07 MG/1
TABLET ORAL
Qty: 90 TABLET | Refills: 1 | Status: SHIPPED | OUTPATIENT
Start: 2018-03-05 | End: 2018-10-10 | Stop reason: SDUPTHER

## 2018-03-05 RX ORDER — LISINOPRIL 2.5 MG/1
TABLET ORAL
Qty: 90 TABLET | Refills: 1 | Status: SHIPPED | OUTPATIENT
Start: 2018-03-05 | End: 2018-10-10 | Stop reason: SDUPTHER

## 2018-03-05 RX ORDER — CLOPIDOGREL BISULFATE 75 MG/1
TABLET ORAL
Qty: 90 TABLET | Refills: 1 | Status: SHIPPED | OUTPATIENT
Start: 2018-03-05 | End: 2018-10-10 | Stop reason: SDUPTHER

## 2018-03-05 RX ORDER — LOVASTATIN 20 MG/1
TABLET ORAL
Qty: 180 TABLET | Refills: 0 | Status: SHIPPED | OUTPATIENT
Start: 2018-03-05 | End: 2018-06-01 | Stop reason: SDUPTHER

## 2018-03-05 NOTE — TELEPHONE ENCOUNTER
Diannia Phoenix called requesting a refill of the below medication which has been pended for you:     Requested Prescriptions     Pending Prescriptions Disp Refills    metoprolol tartrate (LOPRESSOR) 50 MG tablet 180 tablet 1     Sig: TAKE ONE TABLET BY MOUTH TWICE DAILY    levothyroxine (SYNTHROID) 75 MCG tablet 90 tablet 1     Sig: TAKE ONE TABLET BY MOUTH ONCE DAILY    lisinopril (PRINIVIL;ZESTRIL) 2.5 MG tablet 90 tablet 1     Sig: TAKE ONE TABLET BY MOUTH ONCE DAILY    clopidogrel (PLAVIX) 75 MG tablet 90 tablet 1     Sig: TAKE ONE TABLET BY MOUTH ONCE DAILY    lovastatin (MEVACOR) 20 MG tablet 180 tablet 0     Sig: TAKE TWO TABLETS BY MOUTH NIGHTLY    metFORMIN (GLUCOPHAGE) 500 MG tablet 90 tablet 1     Sig: TAKE ONE TABLET BY MOUTH ONCE DAILY WITH BREAKFAST       Last Appointment Date: 10/6/2017  Next Appointment Date: 4/62018    Allergies   Allergen Reactions    Other      She did have an IV antibiotic while she was in the hospital that she had a rash from, but son is unsure of the name of that. Otherwise, she has no known allergies.

## 2018-04-02 ENCOUNTER — HOSPITAL ENCOUNTER (OUTPATIENT)
Dept: LAB | Age: 83
Setting detail: SPECIMEN
Discharge: HOME | End: 2018-04-02
Payer: MEDICARE

## 2018-04-02 DIAGNOSIS — R73.01 IMPAIRED FASTING GLUCOSE: ICD-10-CM

## 2018-04-02 DIAGNOSIS — E03.9 ACQUIRED HYPOTHYROIDISM: ICD-10-CM

## 2018-04-02 DIAGNOSIS — I10 ESSENTIAL HYPERTENSION: ICD-10-CM

## 2018-04-02 DIAGNOSIS — E78.2 MIXED HYPERLIPIDEMIA: ICD-10-CM

## 2018-04-02 LAB
ABSOLUTE EOS #: 0.4 K/UL (ref 0–0.4)
ABSOLUTE IMMATURE GRANULOCYTE: NORMAL K/UL (ref 0–0.3)
ABSOLUTE LYMPH #: 2.7 K/UL (ref 1–4.8)
ABSOLUTE MONO #: 0.7 K/UL (ref 0.1–1.2)
ALBUMIN SERPL-MCNC: 4 G/DL (ref 3.5–5.2)
ALBUMIN/GLOBULIN RATIO: 1 (ref 1–2.5)
ALP BLD-CCNC: 92 U/L (ref 35–104)
ALT SERPL-CCNC: 41 U/L (ref 5–33)
ANION GAP SERPL CALCULATED.3IONS-SCNC: 12 MMOL/L (ref 9–17)
AST SERPL-CCNC: 53 U/L
BASOPHILS # BLD: 1 % (ref 0–1)
BASOPHILS ABSOLUTE: 0.1 K/UL (ref 0–0.2)
BILIRUB SERPL-MCNC: 0.41 MG/DL (ref 0.3–1.2)
BUN BLDV-MCNC: 33 MG/DL (ref 8–23)
BUN/CREAT BLD: 26 (ref 9–20)
CALCIUM SERPL-MCNC: 9.6 MG/DL (ref 8.6–10.4)
CHLORIDE BLD-SCNC: 104 MMOL/L (ref 98–107)
CHOLESTEROL/HDL RATIO: 3
CHOLESTEROL: 175 MG/DL
CO2: 28 MMOL/L (ref 20–31)
CREAT SERPL-MCNC: 1.25 MG/DL (ref 0.5–0.9)
DIFFERENTIAL TYPE: NORMAL
EOSINOPHILS RELATIVE PERCENT: 5 % (ref 1–7)
ESTIMATED AVERAGE GLUCOSE: 100 MG/DL
GFR AFRICAN AMERICAN: 50 ML/MIN
GFR NON-AFRICAN AMERICAN: 41 ML/MIN
GFR SERPL CREATININE-BSD FRML MDRD: ABNORMAL ML/MIN/{1.73_M2}
GFR SERPL CREATININE-BSD FRML MDRD: ABNORMAL ML/MIN/{1.73_M2}
GLUCOSE BLD-MCNC: 94 MG/DL (ref 70–99)
HBA1C MFR BLD: 5.1 % (ref 4.8–5.9)
HCT VFR BLD CALC: 44.9 % (ref 36–46)
HDLC SERPL-MCNC: 59 MG/DL
HEMOGLOBIN: 15 G/DL (ref 12–16)
IMMATURE GRANULOCYTES: NORMAL %
LDL CHOLESTEROL: 97 MG/DL (ref 0–130)
LYMPHOCYTES # BLD: 29 % (ref 16–46)
MCH RBC QN AUTO: 30.9 PG (ref 26–34)
MCHC RBC AUTO-ENTMCNC: 33.4 G/DL (ref 31–37)
MCV RBC AUTO: 92.6 FL (ref 80–100)
MONOCYTES # BLD: 8 % (ref 4–11)
NRBC AUTOMATED: NORMAL PER 100 WBC
PDW BLD-RTO: 13.3 % (ref 11–14.5)
PLATELET # BLD: 246 K/UL (ref 140–450)
PLATELET ESTIMATE: NORMAL
PMV BLD AUTO: 8 FL (ref 6–12)
POTASSIUM SERPL-SCNC: 4.2 MMOL/L (ref 3.7–5.3)
RBC # BLD: 4.85 M/UL (ref 4–5.2)
RBC # BLD: NORMAL 10*6/UL
SEG NEUTROPHILS: 57 % (ref 43–77)
SEGMENTED NEUTROPHILS ABSOLUTE COUNT: 5.4 K/UL (ref 1.8–7.7)
SODIUM BLD-SCNC: 144 MMOL/L (ref 135–144)
THYROXINE, FREE: 1.68 NG/DL (ref 0.93–1.7)
TOTAL PROTEIN: 7.9 G/DL (ref 6.4–8.3)
TRIGL SERPL-MCNC: 97 MG/DL
TSH SERPL DL<=0.05 MIU/L-ACNC: 0.4 MIU/L (ref 0.3–5)
VLDLC SERPL CALC-MCNC: NORMAL MG/DL (ref 1–30)
WBC # BLD: 9.3 K/UL (ref 3.5–11)
WBC # BLD: NORMAL 10*3/UL

## 2018-04-02 PROCEDURE — 80053 COMPREHEN METABOLIC PANEL: CPT

## 2018-04-02 PROCEDURE — 84443 ASSAY THYROID STIM HORMONE: CPT

## 2018-04-02 PROCEDURE — 83036 HEMOGLOBIN GLYCOSYLATED A1C: CPT

## 2018-04-02 PROCEDURE — 80061 LIPID PANEL: CPT

## 2018-04-02 PROCEDURE — 36415 COLL VENOUS BLD VENIPUNCTURE: CPT

## 2018-04-02 PROCEDURE — 84439 ASSAY OF FREE THYROXINE: CPT

## 2018-04-02 PROCEDURE — 85025 COMPLETE CBC W/AUTO DIFF WBC: CPT

## 2018-04-06 ENCOUNTER — OFFICE VISIT (OUTPATIENT)
Dept: FAMILY MEDICINE CLINIC | Age: 83
End: 2018-04-06
Payer: MEDICARE

## 2018-04-06 VITALS
HEIGHT: 59 IN | WEIGHT: 129 LBS | DIASTOLIC BLOOD PRESSURE: 70 MMHG | HEART RATE: 64 BPM | SYSTOLIC BLOOD PRESSURE: 120 MMHG | BODY MASS INDEX: 26 KG/M2 | RESPIRATION RATE: 16 BRPM

## 2018-04-06 DIAGNOSIS — E78.2 MIXED HYPERLIPIDEMIA: ICD-10-CM

## 2018-04-06 DIAGNOSIS — E03.9 ACQUIRED HYPOTHYROIDISM: ICD-10-CM

## 2018-04-06 DIAGNOSIS — I10 ESSENTIAL HYPERTENSION: ICD-10-CM

## 2018-04-06 DIAGNOSIS — I63.9 CEREBRAL INFARCTION, UNSPECIFIED MECHANISM (HCC): ICD-10-CM

## 2018-04-06 DIAGNOSIS — R73.01 IMPAIRED FASTING GLUCOSE: ICD-10-CM

## 2018-04-06 DIAGNOSIS — R19.7 DIARRHEA, UNSPECIFIED TYPE: ICD-10-CM

## 2018-04-06 DIAGNOSIS — I73.9 PERIPHERAL VASCULAR DISEASE (HCC): ICD-10-CM

## 2018-04-06 DIAGNOSIS — R79.89 ABNORMAL LFTS: Primary | ICD-10-CM

## 2018-04-06 PROCEDURE — G8427 DOCREV CUR MEDS BY ELIG CLIN: HCPCS | Performed by: FAMILY MEDICINE

## 2018-04-06 PROCEDURE — 1036F TOBACCO NON-USER: CPT | Performed by: FAMILY MEDICINE

## 2018-04-06 PROCEDURE — 1123F ACP DISCUSS/DSCN MKR DOCD: CPT | Performed by: FAMILY MEDICINE

## 2018-04-06 PROCEDURE — 4040F PNEUMOC VAC/ADMIN/RCVD: CPT | Performed by: FAMILY MEDICINE

## 2018-04-06 PROCEDURE — G8598 ASA/ANTIPLAT THER USED: HCPCS | Performed by: FAMILY MEDICINE

## 2018-04-06 PROCEDURE — 99214 OFFICE O/P EST MOD 30 MIN: CPT | Performed by: FAMILY MEDICINE

## 2018-04-06 PROCEDURE — G8400 PT W/DXA NO RESULTS DOC: HCPCS | Performed by: FAMILY MEDICINE

## 2018-04-06 PROCEDURE — G8417 CALC BMI ABV UP PARAM F/U: HCPCS | Performed by: FAMILY MEDICINE

## 2018-04-06 PROCEDURE — 1090F PRES/ABSN URINE INCON ASSESS: CPT | Performed by: FAMILY MEDICINE

## 2018-04-06 ASSESSMENT — PATIENT HEALTH QUESTIONNAIRE - PHQ9: SUM OF ALL RESPONSES TO PHQ QUESTIONS 1-9: 0

## 2018-04-06 ASSESSMENT — ANXIETY QUESTIONNAIRES: GAD7 TOTAL SCORE: 1

## 2018-04-08 ASSESSMENT — ENCOUNTER SYMPTOMS
DIARRHEA: 0
RHINORRHEA: 0
SHORTNESS OF BREATH: 0
SORE THROAT: 0
CONSTIPATION: 0
COUGH: 0
NAUSEA: 0
VOMITING: 0
EYE DISCHARGE: 0
EYE REDNESS: 0
ABDOMINAL PAIN: 0
TROUBLE SWALLOWING: 0
WHEEZING: 0
SINUS PRESSURE: 0

## 2018-04-26 ENCOUNTER — TELEPHONE (OUTPATIENT)
Dept: PRIMARY CARE CLINIC | Age: 83
End: 2018-04-26

## 2018-05-15 ENCOUNTER — ANTI-COAG VISIT (OUTPATIENT)
Dept: FAMILY MEDICINE CLINIC | Age: 83
End: 2018-05-15

## 2018-05-25 ENCOUNTER — TELEPHONE (OUTPATIENT)
Dept: FAMILY MEDICINE CLINIC | Age: 83
End: 2018-05-25

## 2018-06-01 RX ORDER — LOVASTATIN 20 MG/1
TABLET ORAL
Qty: 180 TABLET | Refills: 1 | Status: SHIPPED | OUTPATIENT
Start: 2018-06-01 | End: 2018-10-10 | Stop reason: SDUPTHER

## 2018-09-14 RX ORDER — LOVASTATIN 20 MG/1
TABLET ORAL
Qty: 180 TABLET | Refills: 1 | OUTPATIENT
Start: 2018-09-14

## 2018-09-14 NOTE — TELEPHONE ENCOUNTER
Chele Chatman called requesting a refill of the below medication which has been pended for you: patient just got 90 day supply 9/1/2018. Will decline and see what labs look like in Oct before refilling    Requested Prescriptions     Pending Prescriptions Disp Refills    lovastatin (MEVACOR) 20 MG tablet [Pharmacy Med Name: LOVASTATIN 20MG     TAB] 180 tablet 1     Sig: TAKE 2 TABLETS BY MOUTH NIGHTLY       Last Appointment Date: 4/6/2018  Next Appointment Date: 10/10/2018    Allergies   Allergen Reactions    Other      She did have an IV antibiotic while she was in the hospital that she had a rash from, but son is unsure of the name of that. Otherwise, she has no known allergies.

## 2018-10-03 ENCOUNTER — HOSPITAL ENCOUNTER (OUTPATIENT)
Dept: LAB | Age: 83
Setting detail: SPECIMEN
Discharge: HOME | End: 2018-10-03
Payer: MEDICARE

## 2018-10-03 DIAGNOSIS — R73.01 IMPAIRED FASTING GLUCOSE: ICD-10-CM

## 2018-10-03 DIAGNOSIS — I10 ESSENTIAL HYPERTENSION: ICD-10-CM

## 2018-10-03 DIAGNOSIS — E78.2 MIXED HYPERLIPIDEMIA: ICD-10-CM

## 2018-10-03 DIAGNOSIS — E03.9 ACQUIRED HYPOTHYROIDISM: ICD-10-CM

## 2018-10-03 LAB
ABSOLUTE EOS #: 0.3 K/UL (ref 0–0.4)
ABSOLUTE IMMATURE GRANULOCYTE: NORMAL K/UL (ref 0–0.3)
ABSOLUTE LYMPH #: 2.4 K/UL (ref 1–4.8)
ABSOLUTE MONO #: 0.7 K/UL (ref 0.1–1.2)
ALBUMIN SERPL-MCNC: 4.1 G/DL (ref 3.5–5.2)
ALBUMIN/GLOBULIN RATIO: 1.1 (ref 1–2.5)
ALP BLD-CCNC: 97 U/L (ref 35–104)
ALT SERPL-CCNC: 44 U/L (ref 5–33)
ANION GAP SERPL CALCULATED.3IONS-SCNC: 9 MMOL/L (ref 9–17)
AST SERPL-CCNC: 55 U/L
BASOPHILS # BLD: 1 % (ref 0–1)
BASOPHILS ABSOLUTE: 0.1 K/UL (ref 0–0.2)
BILIRUB SERPL-MCNC: 0.52 MG/DL (ref 0.3–1.2)
BUN BLDV-MCNC: 28 MG/DL (ref 8–23)
BUN/CREAT BLD: 24 (ref 9–20)
CALCIUM SERPL-MCNC: 9.6 MG/DL (ref 8.6–10.4)
CHLORIDE BLD-SCNC: 104 MMOL/L (ref 98–107)
CHOLESTEROL/HDL RATIO: 2.7
CHOLESTEROL: 166 MG/DL
CO2: 29 MMOL/L (ref 20–31)
CREAT SERPL-MCNC: 1.17 MG/DL (ref 0.5–0.9)
DIFFERENTIAL TYPE: NORMAL
EOSINOPHILS RELATIVE PERCENT: 4 % (ref 1–7)
ESTIMATED AVERAGE GLUCOSE: 100 MG/DL
GFR AFRICAN AMERICAN: 54 ML/MIN
GFR NON-AFRICAN AMERICAN: 44 ML/MIN
GFR SERPL CREATININE-BSD FRML MDRD: ABNORMAL ML/MIN/{1.73_M2}
GFR SERPL CREATININE-BSD FRML MDRD: ABNORMAL ML/MIN/{1.73_M2}
GLUCOSE BLD-MCNC: 100 MG/DL (ref 70–99)
HBA1C MFR BLD: 5.1 % (ref 4.8–5.9)
HCT VFR BLD CALC: 44.8 % (ref 36–46)
HDLC SERPL-MCNC: 61 MG/DL
HEMOGLOBIN: 14.5 G/DL (ref 12–16)
IMMATURE GRANULOCYTES: NORMAL %
LDL CHOLESTEROL: 84 MG/DL (ref 0–130)
LYMPHOCYTES # BLD: 30 % (ref 16–46)
MCH RBC QN AUTO: 30.4 PG (ref 26–34)
MCHC RBC AUTO-ENTMCNC: 32.4 G/DL (ref 31–37)
MCV RBC AUTO: 94 FL (ref 80–100)
MONOCYTES # BLD: 8 % (ref 4–11)
NRBC AUTOMATED: NORMAL PER 100 WBC
PDW BLD-RTO: 13.3 % (ref 11–14.5)
PLATELET # BLD: 278 K/UL (ref 140–450)
PLATELET ESTIMATE: NORMAL
PMV BLD AUTO: 7.9 FL (ref 6–12)
POTASSIUM SERPL-SCNC: 4.3 MMOL/L (ref 3.7–5.3)
RBC # BLD: 4.77 M/UL (ref 4–5.2)
RBC # BLD: NORMAL 10*6/UL
SEG NEUTROPHILS: 57 % (ref 43–77)
SEGMENTED NEUTROPHILS ABSOLUTE COUNT: 4.6 K/UL (ref 1.8–7.7)
SODIUM BLD-SCNC: 142 MMOL/L (ref 135–144)
THYROXINE, FREE: 1.75 NG/DL (ref 0.93–1.7)
TOTAL PROTEIN: 8 G/DL (ref 6.4–8.3)
TRIGL SERPL-MCNC: 103 MG/DL
TSH SERPL DL<=0.05 MIU/L-ACNC: 0.39 MIU/L (ref 0.3–5)
VLDLC SERPL CALC-MCNC: NORMAL MG/DL (ref 1–30)
WBC # BLD: 8.2 K/UL (ref 3.5–11)
WBC # BLD: NORMAL 10*3/UL

## 2018-10-03 PROCEDURE — 80061 LIPID PANEL: CPT

## 2018-10-03 PROCEDURE — 36415 COLL VENOUS BLD VENIPUNCTURE: CPT

## 2018-10-03 PROCEDURE — 83036 HEMOGLOBIN GLYCOSYLATED A1C: CPT

## 2018-10-03 PROCEDURE — 85025 COMPLETE CBC W/AUTO DIFF WBC: CPT

## 2018-10-03 PROCEDURE — 80053 COMPREHEN METABOLIC PANEL: CPT

## 2018-10-03 PROCEDURE — 84439 ASSAY OF FREE THYROXINE: CPT

## 2018-10-03 PROCEDURE — 84443 ASSAY THYROID STIM HORMONE: CPT

## 2018-10-10 ENCOUNTER — OFFICE VISIT (OUTPATIENT)
Dept: FAMILY MEDICINE CLINIC | Age: 83
End: 2018-10-10
Payer: MEDICARE

## 2018-10-10 VITALS
RESPIRATION RATE: 16 BRPM | HEIGHT: 59 IN | HEART RATE: 72 BPM | DIASTOLIC BLOOD PRESSURE: 70 MMHG | WEIGHT: 121 LBS | BODY MASS INDEX: 24.39 KG/M2 | SYSTOLIC BLOOD PRESSURE: 130 MMHG

## 2018-10-10 DIAGNOSIS — R73.01 IMPAIRED FASTING GLUCOSE: ICD-10-CM

## 2018-10-10 DIAGNOSIS — N32.81 OVERACTIVE BLADDER: ICD-10-CM

## 2018-10-10 DIAGNOSIS — N18.30 STAGE 3 CHRONIC KIDNEY DISEASE (HCC): ICD-10-CM

## 2018-10-10 DIAGNOSIS — E03.9 ACQUIRED HYPOTHYROIDISM: ICD-10-CM

## 2018-10-10 DIAGNOSIS — I10 ESSENTIAL HYPERTENSION: Primary | ICD-10-CM

## 2018-10-10 DIAGNOSIS — E78.2 MIXED HYPERLIPIDEMIA: ICD-10-CM

## 2018-10-10 PROCEDURE — G8598 ASA/ANTIPLAT THER USED: HCPCS | Performed by: FAMILY MEDICINE

## 2018-10-10 PROCEDURE — 1101F PT FALLS ASSESS-DOCD LE1/YR: CPT | Performed by: FAMILY MEDICINE

## 2018-10-10 PROCEDURE — 1036F TOBACCO NON-USER: CPT | Performed by: FAMILY MEDICINE

## 2018-10-10 PROCEDURE — 1123F ACP DISCUSS/DSCN MKR DOCD: CPT | Performed by: FAMILY MEDICINE

## 2018-10-10 PROCEDURE — G8400 PT W/DXA NO RESULTS DOC: HCPCS | Performed by: FAMILY MEDICINE

## 2018-10-10 PROCEDURE — 99214 OFFICE O/P EST MOD 30 MIN: CPT | Performed by: FAMILY MEDICINE

## 2018-10-10 PROCEDURE — 4040F PNEUMOC VAC/ADMIN/RCVD: CPT | Performed by: FAMILY MEDICINE

## 2018-10-10 PROCEDURE — G8420 CALC BMI NORM PARAMETERS: HCPCS | Performed by: FAMILY MEDICINE

## 2018-10-10 PROCEDURE — 1090F PRES/ABSN URINE INCON ASSESS: CPT | Performed by: FAMILY MEDICINE

## 2018-10-10 PROCEDURE — G8484 FLU IMMUNIZE NO ADMIN: HCPCS | Performed by: FAMILY MEDICINE

## 2018-10-10 PROCEDURE — G8427 DOCREV CUR MEDS BY ELIG CLIN: HCPCS | Performed by: FAMILY MEDICINE

## 2018-10-10 RX ORDER — CLOPIDOGREL BISULFATE 75 MG/1
TABLET ORAL
Qty: 90 TABLET | Refills: 1 | Status: SHIPPED | OUTPATIENT
Start: 2018-10-10 | End: 2019-04-11 | Stop reason: SDUPTHER

## 2018-10-10 RX ORDER — LISINOPRIL 2.5 MG/1
TABLET ORAL
Qty: 90 TABLET | Refills: 1 | Status: SHIPPED | OUTPATIENT
Start: 2018-10-10 | End: 2019-04-11 | Stop reason: SDUPTHER

## 2018-10-10 RX ORDER — OXYBUTYNIN CHLORIDE 10 MG/1
10 TABLET, EXTENDED RELEASE ORAL DAILY
Qty: 30 TABLET | Refills: 5 | Status: SHIPPED | OUTPATIENT
Start: 2018-10-10 | End: 2019-04-11 | Stop reason: SDUPTHER

## 2018-10-10 RX ORDER — LOVASTATIN 20 MG/1
TABLET ORAL
Qty: 180 TABLET | Refills: 1 | Status: SHIPPED | OUTPATIENT
Start: 2018-10-10 | End: 2019-04-11 | Stop reason: SDUPTHER

## 2018-10-10 RX ORDER — LEVOTHYROXINE SODIUM 0.07 MG/1
TABLET ORAL
Qty: 90 TABLET | Refills: 1 | Status: SHIPPED | OUTPATIENT
Start: 2018-10-10 | End: 2019-04-11 | Stop reason: SDUPTHER

## 2018-10-10 RX ORDER — METOPROLOL TARTRATE 50 MG/1
TABLET, FILM COATED ORAL
Qty: 180 TABLET | Refills: 1 | Status: SHIPPED | OUTPATIENT
Start: 2018-10-10 | End: 2019-04-11 | Stop reason: SDUPTHER

## 2018-10-10 ASSESSMENT — PATIENT HEALTH QUESTIONNAIRE - PHQ9
2. FEELING DOWN, DEPRESSED OR HOPELESS: 0
SUM OF ALL RESPONSES TO PHQ QUESTIONS 1-9: 0
SUM OF ALL RESPONSES TO PHQ QUESTIONS 1-9: 0
1. LITTLE INTEREST OR PLEASURE IN DOING THINGS: 0
SUM OF ALL RESPONSES TO PHQ9 QUESTIONS 1 & 2: 0

## 2018-10-10 ASSESSMENT — ENCOUNTER SYMPTOMS
EYE DISCHARGE: 0
VOMITING: 0
SORE THROAT: 0
WHEEZING: 0
RHINORRHEA: 0
SINUS PRESSURE: 0
CONSTIPATION: 0
EYE REDNESS: 0
DIARRHEA: 0
TROUBLE SWALLOWING: 0
COUGH: 0
SHORTNESS OF BREATH: 0
ABDOMINAL PAIN: 0
NAUSEA: 0

## 2018-10-10 NOTE — PROGRESS NOTES
Gal Epps received counseling on the following healthy behaviors: nutrition and exercise  Reviewed prior labs and health maintenance  Continue current medications, diet and exercise. Discussed use, benefit, and side effects of prescribed medications. Barriers to medication compliance addressed. Patient given educational materials - see patient instructions  Was a self-tracking handout given in paper form or via [employer]? Yes    Requested Prescriptions     Pending Prescriptions Disp Refills    metFORMIN (GLUCOPHAGE) 500 MG tablet 90 tablet 1     Sig: TAKE ONE TABLET BY MOUTH ONCE DAILY WITH BREAKFAST    clopidogrel (PLAVIX) 75 MG tablet 90 tablet 1     Sig: TAKE ONE TABLET BY MOUTH ONCE DAILY    lisinopril (PRINIVIL;ZESTRIL) 2.5 MG tablet 90 tablet 1     Sig: TAKE ONE TABLET BY MOUTH ONCE DAILY    levothyroxine (SYNTHROID) 75 MCG tablet 90 tablet 1     Sig: TAKE ONE TABLET BY MOUTH ONCE DAILY    metoprolol tartrate (LOPRESSOR) 50 MG tablet 180 tablet 1     Sig: TAKE ONE TABLET BY MOUTH TWICE DAILY    lovastatin (MEVACOR) 20 MG tablet 180 tablet 1     Sig: TAKE 2 TABLETS BY MOUTH NIGHTLY       All patient questions answered. Patient voiced understanding. Quality Measures    Body mass index is 24.44 kg/m². Normal. Weight control planned discussed Healthy diet and regular exercise. BP: 130/70. Blood pressure is normal. Treatment plan consists of No treatment change needed. Fall Risk 4/6/2018 4/6/2017 4/7/2016 4/9/2015 4/11/2014   2 or more falls in past year? no no no no no   Fall with injury in past year? no no no no no     The patient does not have a history of falls. I did not - not indicated , complete a risk assessment for falls.  A plan of care for falls No Treatment plan indicated    Lab Results   Component Value Date    LDLCHOLESTEROL 84 10/03/2018    (goal LDL reduction with dx if diabetes is 50% LDL reduction)    PHQ Scores 10/10/2018 4/6/2018 4/6/2017 10/6/2016 4/9/2015 4/11/2014   PHQ2 Score 0

## 2018-10-10 NOTE — PROGRESS NOTES
Patient declines flu, tdap and shingles vaccines; declines mammogram screening at this time. Declines myChart activation.

## 2018-10-10 NOTE — PATIENT INSTRUCTIONS
Hospital Outpatient Visit on 10/03/2018   Component Date Value Ref Range Status    WBC 10/03/2018 8.2  3.5 - 11.0 k/uL Final    RBC 10/03/2018 4.77  4.0 - 5.2 m/uL Final    Hemoglobin 10/03/2018 14.5  12.0 - 16.0 g/dL Final    Hematocrit 10/03/2018 44.8  36 - 46 % Final    MCV 10/03/2018 94.0  80 - 100 fL Final    MCH 10/03/2018 30.4  26 - 34 pg Final    MCHC 10/03/2018 32.4  31 - 37 g/dL Final    RDW 10/03/2018 13.3  11.0 - 14.5 % Final    Platelets 39/28/0406 278  140 - 450 k/uL Final    MPV 10/03/2018 7.9  6.0 - 12.0 fL Final    NRBC Automated 10/03/2018 NOT REPORTED  per 100 WBC Final    Differential Type 10/03/2018 NOT REPORTED   Final    Immature Granulocytes 10/03/2018 NOT REPORTED  0 % Final    Absolute Immature Granulocyte 10/03/2018 NOT REPORTED  0.00 - 0.30 k/uL Final    WBC Morphology 10/03/2018 NOT REPORTED   Final    RBC Morphology 10/03/2018 NOT REPORTED   Final    Platelet Estimate 12/54/9720 NOT REPORTED   Final    Seg Neutrophils 10/03/2018 57  43 - 77 % Final    Lymphocytes 10/03/2018 30  16 - 46 % Final    Monocytes 10/03/2018 8  4 - 11 % Final    Eosinophils % 10/03/2018 4  1 - 7 % Final    Basophils 10/03/2018 1  0 - 1 % Final    Segs Absolute 10/03/2018 4.60  1.8 - 7.7 k/uL Final    Absolute Lymph # 10/03/2018 2.40  1.0 - 4.8 k/uL Final    Absolute Mono # 10/03/2018 0.70  0.1 - 1.2 k/uL Final    Absolute Eos # 10/03/2018 0.30  0.0 - 0.4 k/uL Final    Basophils # 10/03/2018 0.10  0.0 - 0.2 k/uL Final    Glucose 10/03/2018 100* 70 - 99 mg/dL Final    BUN 10/03/2018 28* 8 - 23 mg/dL Final    CREATININE 10/03/2018 1.17* 0.50 - 0.90 mg/dL Final    Bun/Cre Ratio 10/03/2018 24* 9 - 20 Final    Calcium 10/03/2018 9.6  8.6 - 10.4 mg/dL Final    Sodium 10/03/2018 142  135 - 144 mmol/L Final    Potassium 10/03/2018 4.3  3.7 - 5.3 mmol/L Final    Chloride 10/03/2018 104  98 - 107 mmol/L Final    CO2 10/03/2018 29  20 - 31 mmol/L Final    Anion Gap 10/03/2018 9  9 - 17 mmol/L Final    Alkaline Phosphatase 10/03/2018 97  35 - 104 U/L Final    ALT 10/03/2018 44* 5 - 33 U/L Final    AST 10/03/2018 55* <32 U/L Final    Total Bilirubin 10/03/2018 0.52  0.3 - 1.2 mg/dL Final    Total Protein 10/03/2018 8.0  6.4 - 8.3 g/dL Final    Alb 10/03/2018 4.1  3.5 - 5.2 g/dL Final    Albumin/Globulin Ratio 10/03/2018 1.1  1.0 - 2.5 Final    GFR Non- 10/03/2018 44* >60 mL/min Final    GFR  10/03/2018 54* >60 mL/min Final    GFR Comment 10/03/2018        Final    Comment: Average GFR for 79or more years old:   76 mL/min/1.73sq m  Chronic Kidney Disease:   <60 mL/min/1.73sq m  Kidney failure:   <15 mL/min/1.73sq m              eGFR calculated using average adult body mass. Additional eGFR calculator   available at:        Buyou.br            GFR Staging 10/03/2018 NOT REPORTED   Final    Hemoglobin A1C 10/03/2018 5.1  4.8 - 5.9 % Final    Estimated Avg Glucose 10/03/2018 100  mg/dL Final    Cholesterol 10/03/2018 166  <200 mg/dL Final    Comment:    Cholesterol Guidelines:      <200  Desirable   200-240  Borderline      >240  Undesirable         HDL 10/03/2018 61  >40 mg/dL Final    Comment:    HDL Guidelines:    <40     Undesirable   40-59    Borderline    >59     Desirable         LDL Cholesterol 10/03/2018 84  0 - 130 mg/dL Final    Comment:    LDL Guidelines:     <100    Desirable   100-129   Near to/above Desirable   130-159   Borderline      >159   Undesirable     Direct (measured) LDL and calculated LDL are not interchangeable tests.  Chol/HDL Ratio 10/03/2018 2.7  <5 Final            Triglycerides 10/03/2018 103  <150 mg/dL Final    Comment:    Triglyceride Guidelines:     <150   Desirable   150-199  Borderline   200-499  High     >499   Very high   Based on AHA Guidelines for fasting triglyceride, October 2012.          VLDL 10/03/2018 NOT REPORTED  1 - 30 mg/dL Final    TSH 10/03/2018 0.39  0.30

## 2019-04-04 ENCOUNTER — HOSPITAL ENCOUNTER (OUTPATIENT)
Dept: LAB | Age: 84
Discharge: HOME | End: 2019-04-04
Payer: MEDICARE

## 2019-04-04 ENCOUNTER — TELEPHONE (OUTPATIENT)
Dept: FAMILY MEDICINE CLINIC | Age: 84
End: 2019-04-04

## 2019-04-04 DIAGNOSIS — E78.2 MIXED HYPERLIPIDEMIA: ICD-10-CM

## 2019-04-04 DIAGNOSIS — Z86.73 HISTORY OF CVA (CEREBROVASCULAR ACCIDENT): ICD-10-CM

## 2019-04-04 DIAGNOSIS — R73.01 IMPAIRED FASTING GLUCOSE: ICD-10-CM

## 2019-04-04 DIAGNOSIS — E03.9 ACQUIRED HYPOTHYROIDISM: ICD-10-CM

## 2019-04-04 DIAGNOSIS — R47.81 SLURRED SPEECH: Primary | ICD-10-CM

## 2019-04-04 DIAGNOSIS — I10 ESSENTIAL HYPERTENSION: ICD-10-CM

## 2019-04-04 LAB
ABSOLUTE EOS #: 0.4 K/UL (ref 0–0.4)
ABSOLUTE IMMATURE GRANULOCYTE: NORMAL K/UL (ref 0–0.3)
ABSOLUTE LYMPH #: 2 K/UL (ref 1–4.8)
ABSOLUTE MONO #: 0.5 K/UL (ref 0.1–1.2)
ALBUMIN SERPL-MCNC: 3.9 G/DL (ref 3.5–5.2)
ALBUMIN/GLOBULIN RATIO: 1.1 (ref 1–2.5)
ALP BLD-CCNC: 84 U/L (ref 35–104)
ALT SERPL-CCNC: 33 U/L (ref 5–33)
ANION GAP SERPL CALCULATED.3IONS-SCNC: 8 MMOL/L (ref 9–17)
AST SERPL-CCNC: 57 U/L
BASOPHILS # BLD: 1 % (ref 0–1)
BASOPHILS ABSOLUTE: 0.1 K/UL (ref 0–0.2)
BILIRUB SERPL-MCNC: 0.47 MG/DL (ref 0.3–1.2)
BUN BLDV-MCNC: 25 MG/DL (ref 8–23)
BUN/CREAT BLD: 21 (ref 9–20)
CALCIUM SERPL-MCNC: 9.4 MG/DL (ref 8.6–10.4)
CHLORIDE BLD-SCNC: 107 MMOL/L (ref 98–107)
CHOLESTEROL/HDL RATIO: 2.7
CHOLESTEROL: 150 MG/DL
CO2: 29 MMOL/L (ref 20–31)
CREAT SERPL-MCNC: 1.19 MG/DL (ref 0.5–0.9)
DIFFERENTIAL TYPE: NORMAL
EOSINOPHILS RELATIVE PERCENT: 5 % (ref 1–7)
ESTIMATED AVERAGE GLUCOSE: 94 MG/DL
GFR AFRICAN AMERICAN: 53 ML/MIN
GFR NON-AFRICAN AMERICAN: 43 ML/MIN
GFR SERPL CREATININE-BSD FRML MDRD: ABNORMAL ML/MIN/{1.73_M2}
GFR SERPL CREATININE-BSD FRML MDRD: ABNORMAL ML/MIN/{1.73_M2}
GLUCOSE BLD-MCNC: 96 MG/DL (ref 70–99)
HBA1C MFR BLD: 4.9 % (ref 4.8–5.9)
HCT VFR BLD CALC: 45.4 % (ref 36–46)
HDLC SERPL-MCNC: 55 MG/DL
HEMOGLOBIN: 14.9 G/DL (ref 12–16)
IMMATURE GRANULOCYTES: NORMAL %
LDL CHOLESTEROL: 77 MG/DL (ref 0–130)
LYMPHOCYTES # BLD: 27 % (ref 16–46)
MCH RBC QN AUTO: 30.3 PG (ref 26–34)
MCHC RBC AUTO-ENTMCNC: 32.8 G/DL (ref 31–37)
MCV RBC AUTO: 92.5 FL (ref 80–100)
MONOCYTES # BLD: 7 % (ref 4–11)
NRBC AUTOMATED: NORMAL PER 100 WBC
PDW BLD-RTO: 13.4 % (ref 11–14.5)
PLATELET # BLD: 248 K/UL (ref 140–450)
PLATELET ESTIMATE: NORMAL
PMV BLD AUTO: 7.8 FL (ref 6–12)
POTASSIUM SERPL-SCNC: 4.8 MMOL/L (ref 3.7–5.3)
RBC # BLD: 4.91 M/UL (ref 4–5.2)
RBC # BLD: NORMAL 10*6/UL
SEG NEUTROPHILS: 60 % (ref 43–77)
SEGMENTED NEUTROPHILS ABSOLUTE COUNT: 4.6 K/UL (ref 1.8–7.7)
SODIUM BLD-SCNC: 144 MMOL/L (ref 135–144)
THYROXINE, FREE: 1.67 NG/DL (ref 0.93–1.7)
TOTAL PROTEIN: 7.3 G/DL (ref 6.4–8.3)
TRIGL SERPL-MCNC: 91 MG/DL
TSH SERPL DL<=0.05 MIU/L-ACNC: 0.97 MIU/L (ref 0.3–5)
VLDLC SERPL CALC-MCNC: NORMAL MG/DL (ref 1–30)
WBC # BLD: 7.6 K/UL (ref 3.5–11)
WBC # BLD: NORMAL 10*3/UL

## 2019-04-04 PROCEDURE — 36415 COLL VENOUS BLD VENIPUNCTURE: CPT

## 2019-04-04 PROCEDURE — 84443 ASSAY THYROID STIM HORMONE: CPT

## 2019-04-04 PROCEDURE — 80061 LIPID PANEL: CPT

## 2019-04-04 PROCEDURE — 83036 HEMOGLOBIN GLYCOSYLATED A1C: CPT

## 2019-04-04 PROCEDURE — 84439 ASSAY OF FREE THYROXINE: CPT

## 2019-04-04 PROCEDURE — 80053 COMPREHEN METABOLIC PANEL: CPT

## 2019-04-04 PROCEDURE — 85025 COMPLETE CBC W/AUTO DIFF WBC: CPT

## 2019-04-04 NOTE — TELEPHONE ENCOUNTER
Son made aware. His mom has done a MRI previously so he would like to proceed with this. Order placed and patients son transferred to scheduling. Creat was up is it ok to still do contrast with MRI?

## 2019-04-04 NOTE — TELEPHONE ENCOUNTER
Patient's son came to the window stating that the patient is coming in next week to see Dr. Theresa Prabhakar but he's noticed that her speech becomes a little slurred when she gets tired. He wanted to give Dr. Theresa Prabhakar a heads up and see if they needed to do anything before the appointment. Patient stated that she's not having anything other symptoms just slurring when tired. Please advise.

## 2019-04-04 NOTE — TELEPHONE ENCOUNTER
Option would be to repeat imaging of the brain to rule out underlying evidence of CVA or other etiology that could contribute to change in speech. Would probably advise MRI brain with and without contrast if able to tolerate MRI. If not could opt for CT brain instead.

## 2019-04-11 ENCOUNTER — OFFICE VISIT (OUTPATIENT)
Dept: FAMILY MEDICINE CLINIC | Age: 84
End: 2019-04-11
Payer: MEDICARE

## 2019-04-11 ENCOUNTER — HOSPITAL ENCOUNTER (OUTPATIENT)
Dept: MRI IMAGING | Age: 84
Discharge: HOME | End: 2019-04-13
Payer: MEDICARE

## 2019-04-11 ENCOUNTER — TELEPHONE (OUTPATIENT)
Dept: FAMILY MEDICINE CLINIC | Age: 84
End: 2019-04-11

## 2019-04-11 VITALS
RESPIRATION RATE: 16 BRPM | SYSTOLIC BLOOD PRESSURE: 138 MMHG | HEIGHT: 59 IN | WEIGHT: 113 LBS | DIASTOLIC BLOOD PRESSURE: 64 MMHG | HEART RATE: 64 BPM | BODY MASS INDEX: 22.78 KG/M2

## 2019-04-11 DIAGNOSIS — I63.89 OTHER CEREBRAL INFARCTION (HCC): ICD-10-CM

## 2019-04-11 DIAGNOSIS — Z86.73 HISTORY OF CVA (CEREBROVASCULAR ACCIDENT): ICD-10-CM

## 2019-04-11 DIAGNOSIS — I10 ESSENTIAL HYPERTENSION: ICD-10-CM

## 2019-04-11 DIAGNOSIS — E03.9 ACQUIRED HYPOTHYROIDISM: ICD-10-CM

## 2019-04-11 DIAGNOSIS — I63.30 CEREBROVASCULAR ACCIDENT (CVA) DUE TO THROMBOSIS OF CEREBRAL ARTERY (HCC): Primary | ICD-10-CM

## 2019-04-11 DIAGNOSIS — E78.2 MIXED HYPERLIPIDEMIA: ICD-10-CM

## 2019-04-11 DIAGNOSIS — Z00.00 MEDICARE ANNUAL WELLNESS VISIT, SUBSEQUENT: ICD-10-CM

## 2019-04-11 DIAGNOSIS — I73.9 PERIPHERAL VASCULAR DISEASE (HCC): ICD-10-CM

## 2019-04-11 DIAGNOSIS — N18.30 STAGE 3 CHRONIC KIDNEY DISEASE (HCC): ICD-10-CM

## 2019-04-11 DIAGNOSIS — R47.81 SLURRED SPEECH: ICD-10-CM

## 2019-04-11 DIAGNOSIS — R73.01 IMPAIRED FASTING GLUCOSE: ICD-10-CM

## 2019-04-11 PROCEDURE — G8400 PT W/DXA NO RESULTS DOC: HCPCS | Performed by: FAMILY MEDICINE

## 2019-04-11 PROCEDURE — G0439 PPPS, SUBSEQ VISIT: HCPCS | Performed by: FAMILY MEDICINE

## 2019-04-11 PROCEDURE — 93000 ELECTROCARDIOGRAM COMPLETE: CPT | Performed by: FAMILY MEDICINE

## 2019-04-11 PROCEDURE — 70553 MRI BRAIN STEM W/O & W/DYE: CPT

## 2019-04-11 PROCEDURE — 99214 OFFICE O/P EST MOD 30 MIN: CPT | Performed by: FAMILY MEDICINE

## 2019-04-11 PROCEDURE — 4040F PNEUMOC VAC/ADMIN/RCVD: CPT | Performed by: FAMILY MEDICINE

## 2019-04-11 PROCEDURE — 1123F ACP DISCUSS/DSCN MKR DOCD: CPT | Performed by: FAMILY MEDICINE

## 2019-04-11 PROCEDURE — G8598 ASA/ANTIPLAT THER USED: HCPCS | Performed by: FAMILY MEDICINE

## 2019-04-11 PROCEDURE — 6360000004 HC RX CONTRAST MEDICATION: Performed by: FAMILY MEDICINE

## 2019-04-11 PROCEDURE — 1036F TOBACCO NON-USER: CPT | Performed by: FAMILY MEDICINE

## 2019-04-11 PROCEDURE — G8427 DOCREV CUR MEDS BY ELIG CLIN: HCPCS | Performed by: FAMILY MEDICINE

## 2019-04-11 PROCEDURE — A9579 GAD-BASE MR CONTRAST NOS,1ML: HCPCS | Performed by: FAMILY MEDICINE

## 2019-04-11 PROCEDURE — 1090F PRES/ABSN URINE INCON ASSESS: CPT | Performed by: FAMILY MEDICINE

## 2019-04-11 PROCEDURE — G8420 CALC BMI NORM PARAMETERS: HCPCS | Performed by: FAMILY MEDICINE

## 2019-04-11 RX ORDER — METOPROLOL TARTRATE 50 MG/1
TABLET, FILM COATED ORAL
Qty: 180 TABLET | Refills: 1 | Status: SHIPPED | OUTPATIENT
Start: 2019-04-11 | End: 2019-10-16 | Stop reason: SDUPTHER

## 2019-04-11 RX ORDER — LEVOTHYROXINE SODIUM 0.07 MG/1
TABLET ORAL
Qty: 90 TABLET | Refills: 1 | Status: SHIPPED | OUTPATIENT
Start: 2019-04-11 | End: 2019-10-16 | Stop reason: SDUPTHER

## 2019-04-11 RX ORDER — LISINOPRIL 2.5 MG/1
TABLET ORAL
Qty: 90 TABLET | Refills: 1 | Status: SHIPPED | OUTPATIENT
Start: 2019-04-11 | End: 2019-10-16 | Stop reason: SDUPTHER

## 2019-04-11 RX ORDER — CLOPIDOGREL BISULFATE 75 MG/1
TABLET ORAL
Qty: 90 TABLET | Refills: 1 | Status: SHIPPED | OUTPATIENT
Start: 2019-04-11 | End: 2019-10-16 | Stop reason: SDUPTHER

## 2019-04-11 RX ORDER — OXYBUTYNIN CHLORIDE 10 MG/1
10 TABLET, EXTENDED RELEASE ORAL DAILY
Qty: 90 TABLET | Refills: 1 | Status: SHIPPED | OUTPATIENT
Start: 2019-04-11 | End: 2019-10-16 | Stop reason: SDUPTHER

## 2019-04-11 RX ORDER — LOVASTATIN 20 MG/1
TABLET ORAL
Qty: 180 TABLET | Refills: 1 | Status: SHIPPED | OUTPATIENT
Start: 2019-04-11 | End: 2019-10-16 | Stop reason: SDUPTHER

## 2019-04-11 RX ADMIN — GADOTERIDOL 6 ML: 279.3 INJECTION, SOLUTION INTRAVENOUS at 10:14

## 2019-04-11 ASSESSMENT — LIFESTYLE VARIABLES: HOW OFTEN DO YOU HAVE A DRINK CONTAINING ALCOHOL: 0

## 2019-04-11 ASSESSMENT — PATIENT HEALTH QUESTIONNAIRE - PHQ9
SUM OF ALL RESPONSES TO PHQ QUESTIONS 1-9: 0
SUM OF ALL RESPONSES TO PHQ QUESTIONS 1-9: 0

## 2019-04-11 ASSESSMENT — ANXIETY QUESTIONNAIRES: GAD7 TOTAL SCORE: 0

## 2019-04-11 NOTE — Clinical Note
We did not schedule patient back for 6 month follow up and I didn't put lab orders in when she was here because we were trying to coordinate her neurologic testing. I did put orders in and she will need a 6 month follow up for follow up of her chronic conditions as well.

## 2019-04-11 NOTE — PATIENT INSTRUCTIONS
Hospital Outpatient Visit on 04/04/2019   Component Date Value Ref Range Status    Thyroxine, Free 04/04/2019 1.67  0.93 - 1.70 ng/dL Final    TSH 04/04/2019 0.97  0.30 - 5.00 mIU/L Final    Cholesterol 04/04/2019 150  <200 mg/dL Final    Comment:    Cholesterol Guidelines:      <200  Desirable   200-240  Borderline      >240  Undesirable         HDL 04/04/2019 55  >40 mg/dL Final    Comment:    HDL Guidelines:    <40     Undesirable   40-59    Borderline    >59     Desirable         LDL Cholesterol 04/04/2019 77  0 - 130 mg/dL Final    Comment:    LDL Guidelines:     <100    Desirable   100-129   Near to/above Desirable   130-159   Borderline      >159   Undesirable     Direct (measured) LDL and calculated LDL are not interchangeable tests.  Chol/HDL Ratio 04/04/2019 2.7  <5 Final            Triglycerides 04/04/2019 91  <150 mg/dL Final    Comment:    Triglyceride Guidelines:     <150   Desirable   150-199  Borderline   200-499  High     >499   Very high   Based on AHA Guidelines for fasting triglyceride, October 2012.          VLDL 04/04/2019 NOT REPORTED  1 - 30 mg/dL Final    Hemoglobin A1C 04/04/2019 4.9  4.8 - 5.9 % Final    Estimated Avg Glucose 04/04/2019 94  mg/dL Final    Glucose 04/04/2019 96  70 - 99 mg/dL Final    BUN 04/04/2019 25* 8 - 23 mg/dL Final    CREATININE 04/04/2019 1.19* 0.50 - 0.90 mg/dL Final    Bun/Cre Ratio 04/04/2019 21* 9 - 20 Final    Calcium 04/04/2019 9.4  8.6 - 10.4 mg/dL Final    Sodium 04/04/2019 144  135 - 144 mmol/L Final    Potassium 04/04/2019 4.8  3.7 - 5.3 mmol/L Final    Chloride 04/04/2019 107  98 - 107 mmol/L Final    CO2 04/04/2019 29  20 - 31 mmol/L Final    Anion Gap 04/04/2019 8* 9 - 17 mmol/L Final    Alkaline Phosphatase 04/04/2019 84  35 - 104 U/L Final    ALT 04/04/2019 33  5 - 33 U/L Final    AST 04/04/2019 57* <32 U/L Final    Total Bilirubin 04/04/2019 0.47  0.3 - 1.2 mg/dL Final    Total Protein 04/04/2019 7.3  6.4 - 8.3 g/dL Final  Alb 04/04/2019 3.9  3.5 - 5.2 g/dL Final    Albumin/Globulin Ratio 04/04/2019 1.1  1.0 - 2.5 Final    GFR Non- 04/04/2019 43* >60 mL/min Final    GFR  04/04/2019 53* >60 mL/min Final    GFR Comment 04/04/2019        Final    Comment: Average GFR for 79or more years old:   76 mL/min/1.73sq m  Chronic Kidney Disease:   <60 mL/min/1.73sq m  Kidney failure:   <15 mL/min/1.73sq m              eGFR calculated using average adult body mass.  Additional eGFR calculator available at:        [email or web address]            GFR Staging 04/04/2019 NOT REPORTED   Final    WBC 04/04/2019 7.6  3.5 - 11.0 k/uL Final    RBC 04/04/2019 4.91  4.0 - 5.2 m/uL Final    Hemoglobin 04/04/2019 14.9  12.0 - 16.0 g/dL Final    Hematocrit 04/04/2019 45.4  36 - 46 % Final    MCV 04/04/2019 92.5  80 - 100 fL Final    MCH 04/04/2019 30.3  26 - 34 pg Final    MCHC 04/04/2019 32.8  31 - 37 g/dL Final    RDW 04/04/2019 13.4  11.0 - 14.5 % Final    Platelets 19/05/4055 248  140 - 450 k/uL Final    MPV 04/04/2019 7.8  6.0 - 12.0 fL Final    NRBC Automated 04/04/2019 NOT REPORTED  per 100 WBC Final    Differential Type 04/04/2019 NOT REPORTED   Final    Immature Granulocytes 04/04/2019 NOT REPORTED  0 % Final    Absolute Immature Granulocyte 04/04/2019 NOT REPORTED  0.00 - 0.30 k/uL Final    WBC Morphology 04/04/2019 NOT REPORTED   Final    RBC Morphology 04/04/2019 NOT REPORTED   Final    Platelet Estimate 26/05/7994 NOT REPORTED   Final    Seg Neutrophils 04/04/2019 60  43 - 77 % Final    Lymphocytes 04/04/2019 27  16 - 46 % Final    Monocytes 04/04/2019 7  4 - 11 % Final    Eosinophils % 04/04/2019 5  1 - 7 % Final    Basophils 04/04/2019 1  0 - 1 % Final    Segs Absolute 04/04/2019 4.60  1.8 - 7.7 k/uL Final    Absolute Lymph # 04/04/2019 2.00  1.0 - 4.8 k/uL Final    Absolute Mono # 04/04/2019 0.50  0.1 - 1.2 k/uL Final    Absolute Eos # 04/04/2019 0.40 0.0 - 0.4 k/uL Final    Basophils # 04/04/2019 0.10  0.0 - 0.2 k/uL Final       Personalized Preventive Plan for Lani Garcia - 4/11/2019  Medicare offers a range of preventive health benefits. Some of the tests and screenings are paid in full while other may be subject to a deductible, co-insurance, and/or copay. Some of these benefits include a comprehensive review of your medical history including lifestyle, illnesses that may run in your family, and various assessments and screenings as appropriate. After reviewing your medical record and screening and assessments performed today your provider may have ordered immunizations, labs, imaging, and/or referrals for you. A list of these orders (if applicable) as well as your Preventive Care list are included within your After Visit Summary for your review. Other Preventive Recommendations:    · A preventive eye exam performed by an eye specialist is recommended every 1-2 years to screen for glaucoma; cataracts, macular degeneration, and other eye disorders. · A preventive dental visit is recommended every 6 months. · Try to get at least 150 minutes of exercise per week or 10,000 steps per day on a pedometer . · Order or download the FREE \"Exercise & Physical Activity: Your Everyday Guide\" from [employer]. Call 5-301.734.1516 or search [employer] online. · You need 2749-1190 mg of calcium and 9866-7089 IU of vitamin D per day. It is possible to meet your calcium requirement with diet alone, but a vitamin D supplement is usually necessary to meet this goal.  · When exposed to the sun, use a sunscreen that protects against both UVA and UVB radiation with an SPF of 30 or greater. Reapply every 2 to 3 hours or after sweating, drying off with a towel, or swimming. · Always wear a seat belt when traveling in a car. Always wear a helmet when riding a bicycle or motorcycle.

## 2019-04-11 NOTE — PROGRESS NOTES
Medicare Annual Wellness Visit  Name: Devan Lizama Date: 2019   MRN: [id number] Sex: Female   Age: 82 y.o. Ethnicity: Non-/Non    : 1935 Race: Zulma Thompson is here for Medicare AWV (subsequent; last 18); Hypertension (6 mo f/u); Hyperlipidemia (6 mo f/u); Hypothyroidism (6 mo f/u); Blood Sugar Problem (IFG; 6 mo f/u); Chronic Kidney Disease (6 mo f/u); Other (PVD; 6 mo f/u); Discuss Labs (drawn 19); and Other (was slurring her words last Wed and Thurs-normal now-had MRI this morning)    Screenings for behavioral, psychosocial and functional/safety risks, and cognitive dysfunction are all negative except as indicated below. These results, as well as other patient data from the [address] form, are documented in Flowsheets linked to this Encounter. Allergies   Allergen Reactions    Other      She did have an IV antibiotic while she was in the hospital that she had a rash from, but son is unsure of the name of that. Otherwise, she has no known allergies. Prior to Visit Medications    Medication Sig Taking?  Authorizing Provider   NONFORMULARY Relaxing leg cream uses prn active ingred: gnaphalium polycephalum 6X HPUS (pain reliever) and causticum 8X HPUS (nighttime sleep aid) Yes Historical Provider, MD   oxybutynin (DITROPAN-XL) 10 MG extended release tablet Take 1 tablet by mouth daily Yes Dariel Garcia DO   lovastatin (MEVACOR) 20 MG tablet TAKE 2 TABLETS BY MOUTH NIGHTLY Yes Dariel Garcia DO   metoprolol tartrate (LOPRESSOR) 50 MG tablet TAKE ONE TABLET BY MOUTH TWICE DAILY Yes Dariel Garcia DO   levothyroxine (SYNTHROID) 75 MCG tablet TAKE ONE TABLET BY MOUTH ONCE DAILY Yes Dariel Garcia DO   lisinopril (PRINIVIL;ZESTRIL) 2.5 MG tablet TAKE ONE TABLET BY MOUTH ONCE DAILY Yes Dariel Garcia DO   clopidogrel (PLAVIX) 75 MG tablet TAKE ONE TABLET BY MOUTH ONCE DAILY Yes Dariel Garcia DO   metFORMIN Height: 4' 11\" (1.499 m)     Body mass index is 22.82 kg/m². Based upon direct observation of the patient, evaluation of cognition reveals recent and remote memory intact. Patient's complete Health Risk Assessment and screening values have been reviewed and are found in Flowsheets. The following problems were reviewed today and where indicated follow up appointments were made and/or referrals ordered. Positive Risk Factor Screenings with Interventions:     Fall Risk:  Timed Up and Go Test > 12 seconds?: (!) yes  2 or more falls in past year?: no  Fall with injury in past year?: no  Fall Risk Assessment[de-identified] (!) Positive Screening for Falls  Fall Risk Interventions:    · Home safety tips provided    General Health:  General  In general, how would you say your health is?: Very Good  In the past 7 days, have you experienced any of the following? New or Increased Pain, New or Increased Fatigue, Loneliness, Social Isolation, Stress or Anger?: (!) New or Increased Fatigue  Do you get the social and emotional support that you need?: Yes  Do you have a Living Will?: Yes  General Health Risk Interventions:  · Fatigue: related to recent CVA    Health Habits/Nutrition:  Health Habits/Nutrition  Do you exercise for at least 20 minutes 2-3 times per week?: (!) No  Have you lost any weight without trying in the past 3 months?: (!) Yes  Do you eat fewer than 2 meals per day?: No  Have you seen a dentist within the past year?: (!) No  Body mass index is 22.82 kg/m².   Health Habits/Nutrition Interventions:  · Inadequate physical activity:  patient with residual weakness and gait problems since CVA  · Has lost weight    Hearing/Vision:  Hearing/Vision  Do you or your family notice any trouble with your hearing?: No(has hearing aides)  Do you have difficulty driving, watching TV, or doing any of your daily activities because of your eyesight?: No  Have you had an eye exam within the past year?: (!) No  Hearing/Vision

## 2019-04-11 NOTE — TELEPHONE ENCOUNTER
Son calling stating that Alexa Rodrigez was supposed to call him with an [address] appointment for pt today. Son states he needs to know the appointment asap due to scheduling. Please call son.

## 2019-04-12 NOTE — TELEPHONE ENCOUNTER
Scheduled carotid ultrasound at [facility] due to her previous studies being done there on 4/15/2019 at 11:45 am. Scheduled to see Dr Dinesh Kelsey at [facility] 4/17/2019 at 9:20 am. Son made aware.

## 2019-04-15 ENCOUNTER — TELEPHONE (OUTPATIENT)
Dept: FAMILY MEDICINE CLINIC | Age: 84
End: 2019-04-15

## 2019-04-15 DIAGNOSIS — R13.10 DYSPHAGIA, UNSPECIFIED TYPE: ICD-10-CM

## 2019-04-15 DIAGNOSIS — I63.30 CEREBROVASCULAR ACCIDENT (CVA) DUE TO THROMBOSIS OF CEREBRAL ARTERY (HCC): Primary | ICD-10-CM

## 2019-04-15 ASSESSMENT — ENCOUNTER SYMPTOMS
RHINORRHEA: 0
VOMITING: 0
DIARRHEA: 0
EYE DISCHARGE: 0
EYE REDNESS: 0
CONSTIPATION: 0
SORE THROAT: 0
ABDOMINAL PAIN: 0
SINUS PRESSURE: 0
WHEEZING: 0
COUGH: 0
TROUBLE SWALLOWING: 0
NAUSEA: 0
SHORTNESS OF BREATH: 0

## 2019-04-15 NOTE — TELEPHONE ENCOUNTER
Patient had a recent stroke and son states that when she drinks liquids she starts coughing afterwards. Wonders about doing another modified barium swallow study. Did speak with Dr Cuco Roberts and she was in agreement to order. Son was transferred to scheduling to set up appt.

## 2019-04-15 NOTE — PROGRESS NOTES
She does not report any issues with blurred vision or loss of vision, weakness in any of the extremities outside of her chronic weakness from previous stroke, no new numbness or tingling, no headaches, no cognitive changes, no lightheadedness or dizziness, or no other reported changes. At this point son once a what needs to be done further. She is already on Plavix and aspirin daily. I did suggest further imaging studies and follow-up with neurology. As this happened over a week ago I feel like this workup can be done in the next several days duration and we'll continue her on her Plavix and aspirin. I did discuss with her and her son that if she has any further symptoms indicative of stroke she does need to go to the emergency room for acute management and care. They both express understanding of this. She has a known history of hypertension and her blood pressure is stable and controlled on her current medical therapy. Has a known history of hyperlipidemia and her cholesterol levels are stable and adequately controlled with her current dose of lovastatin. Has known hypothyroidism and her thyroid levels are stable and adequately supplemented on her current thyroid dose. Has a known history of impaired fasting glucose and her average blood sugars are staying reasonably well-controlled with continued use of metformin. Encouraged continued low carb/low sugar diet and routine exercise in order to keep this optimally controlled. Has known chronic kidney disease and her creatinine remains stable at this time. Otherwise today no other acute medical concerns.   Patient's recent lab reports are as follows:    Results for orders placed or performed during the hospital encounter of 04/04/19   T4, Free   Result Value Ref Range    Thyroxine, Free 1.67 0.93 - 1.70 ng/dL   TSH without Reflex   Result Value Ref Range    TSH 0.97 0.30 - 5.00 mIU/L   Lipid Panel   Result Value Ref Range    Cholesterol 150 <200 mg/dL HDL 55 >40 mg/dL    LDL Cholesterol 77 0 - 130 mg/dL    Chol/HDL Ratio 2.7 <5    Triglycerides 91 <150 mg/dL    VLDL NOT REPORTED 1 - 30 mg/dL   Hemoglobin A1C   Result Value Ref Range    Hemoglobin A1C 4.9 4.8 - 5.9 %    Estimated Avg Glucose 94 mg/dL   Comprehensive Metabolic Panel   Result Value Ref Range    Glucose 96 70 - 99 mg/dL    BUN 25 (H) 8 - 23 mg/dL    CREATININE 1.19 (H) 0.50 - 0.90 mg/dL    Bun/Cre Ratio 21 (H) 9 - 20    Calcium 9.4 8.6 - 10.4 mg/dL    Sodium 144 135 - 144 mmol/L    Potassium 4.8 3.7 - 5.3 mmol/L    Chloride 107 98 - 107 mmol/L    CO2 29 20 - 31 mmol/L    Anion Gap 8 (L) 9 - 17 mmol/L    Alkaline Phosphatase 84 35 - 104 U/L    ALT 33 5 - 33 U/L    AST 57 (H) <32 U/L    Total Bilirubin 0.47 0.3 - 1.2 mg/dL    Total Protein 7.3 6.4 - 8.3 g/dL    Alb 3.9 3.5 - 5.2 g/dL    Albumin/Globulin Ratio 1.1 1.0 - 2.5    GFR Non-African American 43 (L) >60 mL/min    GFR  53 (L) >60 mL/min    GFR Comment          GFR Staging NOT REPORTED    CBC Auto Differential   Result Value Ref Range    WBC 7.6 3.5 - 11.0 k/uL    RBC 4.91 4.0 - 5.2 m/uL    Hemoglobin 14.9 12.0 - 16.0 g/dL    Hematocrit 45.4 36 - 46 %    MCV 92.5 80 - 100 fL    MCH 30.3 26 - 34 pg    MCHC 32.8 31 - 37 g/dL    RDW 13.4 11.0 - 14.5 %    Platelets 408 325 - 309 k/uL    MPV 7.8 6.0 - 12.0 fL    NRBC Automated NOT REPORTED per 100 WBC    Differential Type NOT REPORTED     Immature Granulocytes NOT REPORTED 0 %    Absolute Immature Granulocyte NOT REPORTED 0.00 - 0.30 k/uL    WBC Morphology NOT REPORTED     RBC Morphology NOT REPORTED     Platelet Estimate NOT REPORTED     Seg Neutrophils 60 43 - 77 %    Lymphocytes 27 16 - 46 %    Monocytes 7 4 - 11 %    Eosinophils % 5 1 - 7 %    Basophils 1 0 - 1 %    Segs Absolute 4.60 1.8 - 7.7 k/uL    Absolute Lymph # 2.00 1.0 - 4.8 k/uL    Absolute Mono # 0.50 0.1 - 1.2 k/uL    Absolute Eos # 0.40 0.0 - 0.4 k/uL    Basophils # 0.10 0.0 - 0.2 k/uL     Did discuss dietary modification and increased exercise to keep cholesterol and blood sugar under good control. Other review of systems are as noted below. Did review patient's med list, allergies, social history, fam history, pmhx and pshx today as noted in the record. Preventative measures are reviewed today. See health maintenance section for complete preventative plan of care. Review of Systems   Constitutional: Negative for chills, fatigue and fever. HENT: Negative for congestion, ear pain, postnasal drip, rhinorrhea, sinus pressure, sore throat and trouble swallowing. Eyes: Negative for discharge and redness. Respiratory: Negative for cough, shortness of breath and wheezing. Cardiovascular: Negative for chest pain. Gastrointestinal: Negative for abdominal pain, constipation, diarrhea, nausea and vomiting. Genitourinary: Positive for frequency. Does still have some issues with urinary incontinence, but this is better with use of ditropan   Musculoskeletal: Negative for arthralgias, myalgias and neck pain. Skin: Negative for rash and wound. Allergic/Immunologic: Negative for environmental allergies. Neurological: Positive for facial asymmetry (chronic and unchanged from previous stroke) and speech difficulty (did have last week on 4/3/19 and 4/4/19, but now is back at baseline speech pattern). Negative for dizziness, weakness, light-headedness and headaches. Hematological: Negative for adenopathy. Psychiatric/Behavioral: Negative. Prior to Visit Medications    Medication Sig Taking?  Authorizing Provider   NONFORMULARY Relaxing leg cream uses prn active ingred: gnaphalium polycephalum 6X HPUS (pain reliever) and causticum 8X HPUS (nighttime sleep aid) Yes Historical Provider, MD   oxybutynin (DITROPAN-XL) 10 MG extended release tablet Take 1 tablet by mouth daily Yes Melani Aleman DO   lovastatin (MEVACOR) 20 MG tablet TAKE 2 TABLETS BY MOUTH NIGHTLY Yes Melani Aleman, DO metoprolol tartrate (LOPRESSOR) 50 MG tablet TAKE ONE TABLET BY MOUTH TWICE DAILY Yes Katty Delcid DO   levothyroxine (SYNTHROID) 75 MCG tablet TAKE ONE TABLET BY MOUTH ONCE DAILY Yes Katty Delcid DO   lisinopril (PRINIVIL;ZESTRIL) 2.5 MG tablet TAKE ONE TABLET BY MOUTH ONCE DAILY Yes Katty Delcid DO   clopidogrel (PLAVIX) 75 MG tablet TAKE ONE TABLET BY MOUTH ONCE DAILY Yes Katty Delcid DO   metFORMIN (GLUCOPHAGE) 500 MG tablet TAKE ONE TABLET BY MOUTH ONCE DAILY WITH BREAKFAST Yes Katty Delcid DO   Calcium Carbonate-Vitamin D (CALCIUM 600 + D PO) Take 1 tablet by mouth 2 times daily. Yes Historical Provider, MD   aspirin 325 MG EC tablet Take 325 mg by mouth daily. Yes Historical Provider, MD   Multiple Vitamins-Minerals (MULTIVITAMIN PO) Take 1 tablet by mouth daily. Multivitamin with iron Yes Historical Provider, MD        Social History     Tobacco Use    Smoking status: Former Smoker     Packs/day: 1.00     Years: 50.00     Pack years: 50.00     Types: Cigarettes    Smokeless tobacco: Never Used    Tobacco comment: Smoked 1 pack a day for 50 years' duration but quit some years ago. Substance Use Topics    Alcohol use: No        Vitals:    04/11/19 1147   BP: 138/64   Site: Left Upper Arm   Position: Sitting   Cuff Size: Medium Adult   Pulse: 64   Resp: 16   Weight: 113 lb (51.3 kg)   Height: 4' 11\" (1.499 m)     Estimated body mass index is 22.82 kg/m² as calculated from the following:    Height as of this encounter: 4' 11\" (1.499 m). Weight as of this encounter: 113 lb (51.3 kg). Physical Exam   Constitutional: She is oriented to person, place, and time. She appears well-developed and well-nourished. No distress. HENT:   Head: Normocephalic and atraumatic. Right Ear: External ear normal.   Left Ear: External ear normal.   Nose: Nose normal.   Mouth/Throat: Oropharynx is clear and moist. No oropharyngeal exudate.    Eyes: Pupils are equal, round, and reactive to light. Conjunctivae and EOM are normal. Right eye exhibits no discharge. Left eye exhibits no discharge. Neck: Normal range of motion. Neck supple. No thyromegaly present. Cardiovascular: Normal rate, regular rhythm and normal heart sounds. Pulmonary/Chest: Effort normal and breath sounds normal. She has no wheezes. She has no rales. Abdominal: Soft. Bowel sounds are normal.   Musculoskeletal: She exhibits no edema. Lymphadenopathy:     She has no cervical adenopathy. Neurological: She is alert and oriented to person, place, and time. She displays normal reflexes. Chronic weakness to the right lower extremity from previous stroke. Does have left sided facial droop, chronic. No other change in neurologic exam from previous examination. Skin: Skin is warm and dry. No rash noted. She is not diaphoretic. Psychiatric: She has a normal mood and affect. Her behavior is normal. Judgment and thought content normal.   Nursing note and vitals reviewed. ASSESSMENT/PLAN:  Encounter Diagnoses   Name Primary?     Cerebrovascular accident (CVA) due to thrombosis of cerebral artery (HCC) Yes    Essential hypertension     Mixed hyperlipidemia     Acquired hypothyroidism     Impaired fasting glucose     Stage 3 chronic [facility] disease ([facility] [address]     Other cerebral infarction      Medicare annual wellness visit, subsequent     Peripheral vascular disease ([facility] [address]        Orders Placed This Encounter   Procedures    VL DUP CAROTID BILATERAL     Standing Status:   Future     Standing Expiration Date:   4/11/2020    CBC Auto Differential     To be done in 6 months     Standing Status:   Future     Standing Expiration Date:   4/14/2020    Comprehensive Metabolic Panel     To be done in 6 months     Standing Status:   Future     Standing Expiration Date:   4/14/2020    Lipid Panel     To be done in 6 months     Standing Status:   Future     Standing Expiration Date:   4/14/2020     Order Specific Question:   Is Patient Fasting?/# of Hours     Answer:   12 hours    Hemoglobin A1C     To be done in 6 months     Standing Status:   Future     Standing Expiration Date:   4/14/2020    TSH without Reflex     To be done in 6 months     Standing Status:   Future     Standing Expiration Date:   4/14/2020    T4, Free     To be done in 6 months     Standing Status:   Future     Standing Expiration Date:   4/14/2020    External Referral To Neurology     Referral Priority:   Routine     Referral Type:   Eval and Treat     Referral Reason:   Specialty Services Required     Requested Specialty:   Neurology     Number of Visits Requested:   1    Holter Monitor 48 Hour     Recent CVA     Standing Status:   Future     Standing Expiration Date:   4/10/2020     Order Specific Question:   Reason for Exam?     Answer: Other    Echo Complete     Standing Status:   Future     Standing Expiration Date:   4/10/2020    EKG 12 Lead     Order Specific Question:   Reason for Exam?     Answer: Other     Comments:   alpa       Will get her carotid Dopplers done in the next week duration. She was scheduled to have these done for Dr. Lorenzo Syed at the end of May but will try to get these done a little sooner to see if there is any change in the known right carotid atherosclerosis. This does have a significant change will need to get her into see the vascular specialist sooner. Performed an EKG today which showed sinus bradycardia with a left axis. No arrhythmia noted. I do think I would order a 48-hour Holter to ensure there is no underlying intermittent A. fib that could have contributed to her symptoms. Also will get a 2-D echo. Patient is back at her baseline neurologic status without significant change in her examination from previous examinations. I do not think that any further physical therapy is needed at this time. We'll get her into see the neurologist for further opinion regarding any further management recommendations.   I did discuss with both her and her son that if she has any recurrent symptoms she does need to go to the emergency room for immediate assistance. The quicker that this is assessed the more potential for improved outcome. Symptoms were over a week ago and she is not having any residual effects so would just monitor for now. .  Orders Placed This Encounter   Medications    oxybutynin (DITROPAN-XL) 10 MG extended release tablet     Sig: Take 1 tablet by mouth daily     Dispense:  90 tablet     Refill:  1    lovastatin (MEVACOR) 20 MG tablet     Sig: TAKE 2 TABLETS BY MOUTH NIGHTLY     Dispense:  180 tablet     Refill:  1    metoprolol tartrate (LOPRESSOR) 50 MG tablet     Sig: TAKE ONE TABLET BY MOUTH TWICE DAILY     Dispense:  180 tablet     Refill:  1    levothyroxine (SYNTHROID) 75 MCG tablet     Sig: TAKE ONE TABLET BY MOUTH ONCE DAILY     Dispense:  90 tablet     Refill:  1    lisinopril (PRINIVIL;ZESTRIL) 2.5 MG tablet     Sig: TAKE ONE TABLET BY MOUTH ONCE DAILY     Dispense:  90 tablet     Refill:  1    clopidogrel (PLAVIX) 75 MG tablet     Sig: TAKE ONE TABLET BY MOUTH ONCE DAILY     Dispense:  90 tablet     Refill:  1    metFORMIN (GLUCOPHAGE) 500 MG tablet     Sig: TAKE ONE TABLET BY MOUTH ONCE DAILY WITH BREAKFAST     Dispense:  90 tablet     Refill:  1     With regards to her other chronic health condition she is relatively stable. I did recommend that she stay on her current medical therapy and we will plan to repeat lab testing in 6 months duration. Patient is to continue to follow a low-carb/low sugar/low fat diet and increase exercise for optimal blood sugar and cholesterol control. Patient is to return to my office in 6 months duration or sooner if any further problems or symptoms arise.     (Please note that portions of this note were completed with a voice recognition program. Efforts were made to edit the dictations but occasionally words are mis-transcribed.)      No follow-ups on file.    An electronic signature was used to authenticate this note.     --Dariel Garcia, DO on 4/15/2019 at 7:50 AM

## 2019-04-16 ENCOUNTER — HOSPITAL ENCOUNTER (OUTPATIENT)
Dept: NON INVASIVE DIAGNOSTICS | Age: 84
Discharge: HOME | End: 2019-04-16
Payer: MEDICARE

## 2019-04-16 DIAGNOSIS — I63.30 CEREBROVASCULAR ACCIDENT (CVA) DUE TO THROMBOSIS OF CEREBRAL ARTERY (HCC): ICD-10-CM

## 2019-04-16 DIAGNOSIS — I63.89 OTHER CEREBRAL INFARCTION (HCC): Primary | ICD-10-CM

## 2019-04-16 LAB
LV EF: 60 %
LVEF MODALITY: NORMAL

## 2019-04-16 PROCEDURE — 93306 TTE W/DOPPLER COMPLETE: CPT

## 2019-04-16 PROCEDURE — 93226 XTRNL ECG REC<48 HR SCAN A/R: CPT

## 2019-04-16 PROCEDURE — 93225 XTRNL ECG REC<48 HRS REC: CPT

## 2019-04-18 ENCOUNTER — HOSPITAL ENCOUNTER (OUTPATIENT)
Dept: NON INVASIVE DIAGNOSTICS | Age: 84
Discharge: HOME | End: 2019-04-18
Payer: MEDICARE

## 2019-04-25 ENCOUNTER — HOSPITAL ENCOUNTER (OUTPATIENT)
Dept: GENERAL RADIOLOGY | Age: 84
Discharge: HOME | End: 2019-04-27
Payer: MEDICARE

## 2019-04-25 ENCOUNTER — HOSPITAL ENCOUNTER (OUTPATIENT)
Dept: SPEECH THERAPY | Age: 84
Setting detail: THERAPIES SERIES
Discharge: HOME | End: 2019-04-25
Payer: MEDICARE

## 2019-04-25 DIAGNOSIS — I63.30 CEREBROVASCULAR ACCIDENT (CVA) DUE TO THROMBOSIS OF CEREBRAL ARTERY (HCC): ICD-10-CM

## 2019-04-25 DIAGNOSIS — R13.14 PHARYNGOESOPHAGEAL DYSPHAGIA: Primary | ICD-10-CM

## 2019-04-25 DIAGNOSIS — R13.10 DYSPHAGIA, UNSPECIFIED TYPE: ICD-10-CM

## 2019-04-25 PROCEDURE — 74230 X-RAY XM SWLNG FUNCJ C+: CPT

## 2019-04-25 PROCEDURE — 92611 MOTION FLUOROSCOPY/SWALLOW: CPT | Performed by: SPEECH-LANGUAGE PATHOLOGIST

## 2019-04-25 PROCEDURE — 2500000003 HC RX 250 WO HCPCS: Performed by: FAMILY MEDICINE

## 2019-04-25 RX ADMIN — BARIUM SULFATE 140 ML: 980 POWDER, FOR SUSPENSION ORAL at 09:27

## 2019-04-25 NOTE — PROGRESS NOTES
SPEECH THERAPY  MODIFIED BARIUM SWALLOW STUDY    [x] Outpatient [address] [facility]  [] Inpatient- [facility]    Patient: Kash Tian  YOB: 1935  Gender: female  MRN:  [id number]  Referring Physician: Do Raffi Barnard. [address] [facility]  Diagnosis:  pharyngeal dysphagia     Date of Study:  04/25/19    History of Present Illness/Injury:  Patient is an 82 y.o. female who incurred a CVA on 04/03/19. Her son noted increased coughing when drinking. Patient has had prior CVA in 2008 with residual hoarse vocal quality and dysarthria of speech. Patient had dysphagia at that time which resolved with treatment. She was noted to have a Zenker's diverticulum at that time which was not addressed. Past Medical History:   Diagnosis Date    Carotid artery stenosis     Cataracts, bilateral     CVA (cerebral vascular accident) ([facility] [address] 04-    (Left-sided) - During carotid endarterectomy.     Deafness in right ear     Hyperlipidemia     Hypertension     Hypothyroidism     Neuropathy     Prediabetes          Reported Dysphagia Symptoms:  [x]Coughing []Food catching in throat []Drooling  []Reflux               []Wet Vocal Quality  []Other:     Reason for referral:   [x]Assess physiology of swallow   [x]Determine appropriate diet, posture, or compensatory strategies  [x]Rule out aspiration    []Other:      Prior MBSS:  []No   [x]Yes    Date:  2008    Results:  WFL, although Zenker's diverticulum observed    Pain  [x]No     []Yes      Location:  N/A  Pain Rating (0-10 pain scale): 0  Pain Description:  N/A        Current Diet:   []NPO [x] Regular diet  [] Soft diet [] Pureed diet         [] Dysphagia Level 1 [] Dysphagia Level  2 [] Dysphagia Level 3    Current Liquids: [x] Thin  []Nectar [] Honey []Pudding    Respiratory Status:   [x] Independent  [] Nasal Cannula [] Oxygen Mask        [] Tracheostomy [] Other:       [] Ventilator/Settings:    Behavioral Observation: [x] Alert   [x] Oriented   [] Confused  [] Lethargic      [] Dysarthric  [] Limited Direction Following  [] Agitated  [] Other:    Patient was evaluated using:   [x] Thin liquid   [] Nectar thick liquid  [] Honey thick liquid  [] Pudding thick liquid    [x] Solid [x] Soft  [x] Puree      Dentition   []natural, condition:    []good  []fair  []poor   []edentulous   []partials:   []upper []lower []loose []not worn for MBSS   [x]dentures:   [x]upper [x]lower []loose []not worn for MBSS      ORAL PREPARATION PHASE: [x] WFL  [] Impaired/Reduced   [x] Slow mastication     [x] Uncoordinated mastication    [] Decreased bolus control   [] Decreased bolus formation   [] Loss of bolus from lips / Anterior spillage   [] OTHER:       ORAL PHASE: [] WFL              [x] Impaired/Reduced   [] Residue in the anterior sulcus                   [] Residue in the lateral sulcus - right   [] Residue in the lateral sulcus - left               [] Uncoordinated AP movement   [x] Slow AP movement                             [] Decreased lingual elevation   [] Decreased tongue base retraction               [] Uncontrolled bolus/diffuse fall over tongue base   [] Piecemeal deglutition     [] Base of tongue residue   [] OTHER:     ORAL PHASE BOB SCORE: (Dysphagia outcome and severity scale)  [] 7 = Normal in all situations  [x] 6 = WFL / Mod I; Normal diet; May have mild oral delay  [] 5 = Mild dysphagia; May have one diet consistency restricted; Mild oral residue -clears. [] 4 = Mild-Moderate dysphagia; May have one or two diet consistencies restricted; Oral residue clears with cue; Intermittent supervision or cueing. [] 3 = Moderate dysphagia; Total assist with strategies; Two or more consistencies restricted; Moderate oral residue clears with cue;   [] 2 = Moderately Severe dysphagia; Tolerates at least one consistency safely with total use of strategies;  Severe oral residue and unable to clear or needs multiple cues; Non-oral nutrition. [] 1 = Severe dysphagia; NPO; Unable to tolerate any po safely; Severe oral loss of bolus or oral residue; Non-oral nutrition. PHARYNGEAL PHASE: [] WFL  [x] Impaired   [] Absent Swallow                [] Delayed Swallow               [] Decreased airway protection   [x] Decreased epiglottic inversion     [x] Decreased hyolaryngeal elevation   [x] Residue in the valleculae-minimal amount with thin liquids, moderate amount with dry solids               [x] Residue in the pyriform sinus-moderate amount with puree, mechanical soft, dry solids     [] Cricopharyngeal dysfunction              [] Residue along posterior pharyngeal wall     [] Residue along the ariepiglottic folds    [] Decreased pharyngeal contraction   [] OTHER:    PHARYNGEAL PHASE BOB SCORE: (Dysphagia outcome and severity scale)  [] 7 = Normal in all situations; Normal diet; No strategies  [] 6 = WFL / Mod I; May have mild pharyngeal delay or residue but clears spontaneously; No aspiration or laryngeal penetration  [] 5 = Mild dysphagia:  May need one consistency restricted; May have one or more of the following:  Aspiration with thin - cough to clear; Airway penetration midway to the vocal cords with one or more consistency or to the vocal folds with one consistency, but clears spontaneously; Residue in the pharynx clears spontaneously. [x] 4 = Mild - Moderate dysphagia: One or two consistencies restricted; May exhibit one or more of the following:  Residue clears with cue; Aspiration of one consistency with weak or no reflexive cough; Laryngeal penetration to the vocal cords with cough with two consistencies; Laryngeal penetration to the vocal cords without cough on one consistency. [] 3 = Moderate dysphagia:  Two or more diet consistencies restricted; May exhibit one or more of the following:   Moderate residue clears with cue; Airway penetration to the level of the vocal folds without cough with two or more consistencies; Aspiration with two consistencies with weak or no reflexive cough; Aspiration of one consistency, no cough and airway penetration with one consistency, no cough. [] 2 = Moderately Severe dysphagia: Tolerates at least one consistency safely with total use of strategies; Non-oral nutrition; Severe residue unable to clear; Aspiration with two or more consistencies with no cough; Aspiration with one or more consistency, no cough and airway penetration to the vocal folds with one or more consistency, no cough. [] 1 = Severe dysphagia:  NPO; Unable to tolerate any po safely; Severe residue unable to clear; Silent aspiration with two or more consistencies, non-functional cough;  OR Unable to achieve a swallow.         SIGNS AND SYMPTOMS OF LARYNGEAL PENETRATION / ASPIRATION:  [x] No evidence of laryngeal penetration  [x] No evidence of aspiration  [] Laryngeal penetration evident with:  [] Audible aspiration evident with:  [] Silent aspiration evident with:    PENETRATION-ASPIRATION SCALE (PAS):  [x] 1 = Material does not enter the airway  [] 2 = Material enters the airway, remains above the vocal folds, and is ejected from the airway  [] 3 = Material enters the airway, remains above the vocal folds, and is not ejected from airway  [] 4 = Material enters the airway, contacts the vocal folds, and is ejected from the airway  [] 5 = Material enters the airway, contacts the vocal folds, and is not ejected from the airway  [] 6 = Material enters the airway, passes below the vocal folds, and is ejected into the larynx or out of the airway  [] 7 = Material enters the airway, passes below the vocal folds, and is not ejected from the trachea despite effort  [] 8 = Material enters the airway, passes below the vocal folds, and no effort is made to eject    ESOPHAGEAL PHASE:   [] No significant findings  [] See Radiology Report for details  [x] Recommend further esophageal testing-Zenker's diverticulum observed just inferior to UES near C6-C7, fills and then backflows into phayrnx    ATTEMPTED TECHNIQUES:                                 Comments:  [x]Small bolus size [] Effective []Not Effective    []Straw [] Effective []Not Effective    []Cup [] Effective []Not Effective    []Chin tuck [] Effective []Not Effective    []Head Turn [] Effective []Not Effective    []Spoon presentations [] Effective []Not Effective    [] Volitional cough [] Effective []Not Effective    []Spontaneous cough [] Effective []Not Effective    [x]OTHER:  Liquid wash [x] Effective []Not Effective Helps clear diverticulum some     DIAGNOSTIC IMPRESSIONS:  Patient presents with moderate pharyngoesophageal dysphagia characterized by reduced hyolaryngeal excursion, resulting in minimal to moderate vallecular stasis. Patient observed to have Zenker's diverticulum just inferior to UES near C6-C7 which fills with bolus (both liquids and solids). Once full, bolus backflows into pyriform sinuses (moderate amount) causing patient to cough. No aspiration or penetration observed; however, based on backflow into pharynx patient at moderate risk of aspiration and pulmonary compromise.           DIET RECOMMENDATIONS:      [] NPO  [x] Regular [] Soft  [] Soft with ground meat [] Puree  [] Dysphagia Level 1 [] Dysphagia Level 2 [] Dysphagia Level 3    LIQUID RECOMMENDATIONS:   [x] Thin liquid [] Nectar thick liquid [] Honey thick liquid [] Pudding thick liquid    STRATEGIES:   [x] Full upright position  [x] Small bite/sip   [] No Straw   [x] Multiple Swallow  [] Chin tuck   [] Head turn   [] Pulmonary monitoring [] Oral care after all meals  [] Supervision   [] Medication in applesauce   [] Direct 1:1 Supervision [] Spoon all liquids   [x] Alternate solid / liquid [] Limit distractions   [] Monitor for fatigue  [] PMV in place for all po   [] OTHER:    PATIENT STRENGTHS:  [x] Motivated [x] Cooperative   [x] Good family support    [x] Prior

## 2019-10-11 ENCOUNTER — HOSPITAL ENCOUNTER (OUTPATIENT)
Dept: LAB | Age: 84
Discharge: HOME | End: 2019-10-11
Payer: MEDICARE

## 2019-10-11 DIAGNOSIS — I10 ESSENTIAL HYPERTENSION: ICD-10-CM

## 2019-10-11 DIAGNOSIS — E03.9 ACQUIRED HYPOTHYROIDISM: ICD-10-CM

## 2019-10-11 DIAGNOSIS — E78.2 MIXED HYPERLIPIDEMIA: ICD-10-CM

## 2019-10-11 DIAGNOSIS — R73.01 IMPAIRED FASTING GLUCOSE: ICD-10-CM

## 2019-10-11 LAB
ABSOLUTE EOS #: 0.3 K/UL (ref 0–0.4)
ABSOLUTE IMMATURE GRANULOCYTE: NORMAL K/UL (ref 0–0.3)
ABSOLUTE LYMPH #: 1.1 K/UL (ref 1–4.8)
ABSOLUTE MONO #: 0.5 K/UL (ref 0.1–1.2)
ALBUMIN SERPL-MCNC: 4.1 G/DL (ref 3.5–5.2)
ALBUMIN/GLOBULIN RATIO: 1.1 (ref 1–2.5)
ALP BLD-CCNC: 82 U/L (ref 35–104)
ALT SERPL-CCNC: 32 U/L (ref 5–33)
ANION GAP SERPL CALCULATED.3IONS-SCNC: 8 MMOL/L (ref 9–17)
AST SERPL-CCNC: 60 U/L
BASOPHILS # BLD: 1 % (ref 0–1)
BASOPHILS ABSOLUTE: 0.1 K/UL (ref 0–0.2)
BILIRUB SERPL-MCNC: 0.45 MG/DL (ref 0.3–1.2)
BUN BLDV-MCNC: 29 MG/DL (ref 8–23)
BUN/CREAT BLD: 26 (ref 9–20)
CALCIUM SERPL-MCNC: 9.9 MG/DL (ref 8.6–10.4)
CHLORIDE BLD-SCNC: 101 MMOL/L (ref 98–107)
CHOLESTEROL/HDL RATIO: 2.6
CHOLESTEROL: 153 MG/DL
CO2: 29 MMOL/L (ref 20–31)
CREAT SERPL-MCNC: 1.13 MG/DL (ref 0.5–0.9)
DIFFERENTIAL TYPE: NORMAL
EOSINOPHILS RELATIVE PERCENT: 4 % (ref 1–7)
ESTIMATED AVERAGE GLUCOSE: 94 MG/DL
GFR AFRICAN AMERICAN: 56 ML/MIN
GFR NON-AFRICAN AMERICAN: 46 ML/MIN
GFR SERPL CREATININE-BSD FRML MDRD: ABNORMAL ML/MIN/{1.73_M2}
GFR SERPL CREATININE-BSD FRML MDRD: ABNORMAL ML/MIN/{1.73_M2}
GLUCOSE BLD-MCNC: 92 MG/DL (ref 70–99)
HBA1C MFR BLD: 4.9 % (ref 4.8–5.9)
HCT VFR BLD CALC: 42.4 % (ref 36–46)
HDLC SERPL-MCNC: 60 MG/DL
HEMOGLOBIN: 14.1 G/DL (ref 12–16)
IMMATURE GRANULOCYTES: NORMAL %
LDL CHOLESTEROL: 78 MG/DL (ref 0–130)
LYMPHOCYTES # BLD: 16 % (ref 16–46)
MCH RBC QN AUTO: 31.2 PG (ref 26–34)
MCHC RBC AUTO-ENTMCNC: 33.2 G/DL (ref 31–37)
MCV RBC AUTO: 93.9 FL (ref 80–100)
MONOCYTES # BLD: 6 % (ref 4–11)
NRBC AUTOMATED: NORMAL PER 100 WBC
PDW BLD-RTO: 13.2 % (ref 11–14.5)
PLATELET # BLD: 287 K/UL (ref 140–450)
PLATELET ESTIMATE: NORMAL
PMV BLD AUTO: 7.6 FL (ref 6–12)
POTASSIUM SERPL-SCNC: 4.5 MMOL/L (ref 3.7–5.3)
RBC # BLD: 4.52 M/UL (ref 4–5.2)
RBC # BLD: NORMAL 10*6/UL
SEG NEUTROPHILS: 73 % (ref 43–77)
SEGMENTED NEUTROPHILS ABSOLUTE COUNT: 5.1 K/UL (ref 1.8–7.7)
SODIUM BLD-SCNC: 138 MMOL/L (ref 135–144)
THYROXINE, FREE: 1.62 NG/DL (ref 0.93–1.7)
TOTAL PROTEIN: 7.8 G/DL (ref 6.4–8.3)
TRIGL SERPL-MCNC: 75 MG/DL
TSH SERPL DL<=0.05 MIU/L-ACNC: 0.62 MIU/L (ref 0.3–5)
VLDLC SERPL CALC-MCNC: NORMAL MG/DL (ref 1–30)
WBC # BLD: 7 K/UL (ref 3.5–11)
WBC # BLD: NORMAL 10*3/UL

## 2019-10-11 PROCEDURE — 85025 COMPLETE CBC W/AUTO DIFF WBC: CPT

## 2019-10-11 PROCEDURE — 36415 COLL VENOUS BLD VENIPUNCTURE: CPT

## 2019-10-11 PROCEDURE — 84439 ASSAY OF FREE THYROXINE: CPT

## 2019-10-11 PROCEDURE — 80061 LIPID PANEL: CPT

## 2019-10-11 PROCEDURE — 83036 HEMOGLOBIN GLYCOSYLATED A1C: CPT

## 2019-10-11 PROCEDURE — 80053 COMPREHEN METABOLIC PANEL: CPT

## 2019-10-11 PROCEDURE — 84443 ASSAY THYROID STIM HORMONE: CPT

## 2019-10-16 ENCOUNTER — OFFICE VISIT (OUTPATIENT)
Dept: FAMILY MEDICINE CLINIC | Age: 84
End: 2019-10-16
Payer: MEDICARE

## 2019-10-16 VITALS
RESPIRATION RATE: 18 BRPM | WEIGHT: 107.4 LBS | SYSTOLIC BLOOD PRESSURE: 120 MMHG | DIASTOLIC BLOOD PRESSURE: 60 MMHG | HEIGHT: 59 IN | HEART RATE: 68 BPM | BODY MASS INDEX: 21.65 KG/M2

## 2019-10-16 DIAGNOSIS — R73.01 IMPAIRED FASTING GLUCOSE: ICD-10-CM

## 2019-10-16 DIAGNOSIS — E03.9 ACQUIRED HYPOTHYROIDISM: ICD-10-CM

## 2019-10-16 DIAGNOSIS — E78.2 MIXED HYPERLIPIDEMIA: ICD-10-CM

## 2019-10-16 DIAGNOSIS — I10 ESSENTIAL HYPERTENSION: Primary | ICD-10-CM

## 2019-10-16 DIAGNOSIS — I63.30 CEREBROVASCULAR ACCIDENT (CVA) DUE TO THROMBOSIS OF CEREBRAL ARTERY (HCC): ICD-10-CM

## 2019-10-16 PROCEDURE — 1036F TOBACCO NON-USER: CPT | Performed by: FAMILY MEDICINE

## 2019-10-16 PROCEDURE — 99214 OFFICE O/P EST MOD 30 MIN: CPT | Performed by: FAMILY MEDICINE

## 2019-10-16 PROCEDURE — 4040F PNEUMOC VAC/ADMIN/RCVD: CPT | Performed by: FAMILY MEDICINE

## 2019-10-16 PROCEDURE — G8400 PT W/DXA NO RESULTS DOC: HCPCS | Performed by: FAMILY MEDICINE

## 2019-10-16 PROCEDURE — 1090F PRES/ABSN URINE INCON ASSESS: CPT | Performed by: FAMILY MEDICINE

## 2019-10-16 PROCEDURE — 1123F ACP DISCUSS/DSCN MKR DOCD: CPT | Performed by: FAMILY MEDICINE

## 2019-10-16 PROCEDURE — G8420 CALC BMI NORM PARAMETERS: HCPCS | Performed by: FAMILY MEDICINE

## 2019-10-16 PROCEDURE — G8484 FLU IMMUNIZE NO ADMIN: HCPCS | Performed by: FAMILY MEDICINE

## 2019-10-16 PROCEDURE — G8427 DOCREV CUR MEDS BY ELIG CLIN: HCPCS | Performed by: FAMILY MEDICINE

## 2019-10-16 PROCEDURE — G8598 ASA/ANTIPLAT THER USED: HCPCS | Performed by: FAMILY MEDICINE

## 2019-10-16 RX ORDER — LISINOPRIL 2.5 MG/1
TABLET ORAL
Qty: 90 TABLET | Refills: 1 | Status: SHIPPED | OUTPATIENT
Start: 2019-10-16 | End: 2020-05-21 | Stop reason: SDUPTHER

## 2019-10-16 RX ORDER — METOPROLOL TARTRATE 50 MG/1
TABLET, FILM COATED ORAL
Qty: 180 TABLET | Refills: 1 | Status: SHIPPED | OUTPATIENT
Start: 2019-10-16 | End: 2020-05-21 | Stop reason: SDUPTHER

## 2019-10-16 RX ORDER — LOVASTATIN 20 MG/1
TABLET ORAL
Qty: 180 TABLET | Refills: 1 | Status: SHIPPED | OUTPATIENT
Start: 2019-10-16 | End: 2020-05-21 | Stop reason: SDUPTHER

## 2019-10-16 RX ORDER — LEVOTHYROXINE SODIUM 0.07 MG/1
TABLET ORAL
Qty: 90 TABLET | Refills: 1 | Status: SHIPPED | OUTPATIENT
Start: 2019-10-16 | End: 2020-05-21 | Stop reason: SDUPTHER

## 2019-10-16 RX ORDER — CLOPIDOGREL BISULFATE 75 MG/1
TABLET ORAL
Qty: 90 TABLET | Refills: 1 | Status: SHIPPED | OUTPATIENT
Start: 2019-10-16 | End: 2020-05-21 | Stop reason: SDUPTHER

## 2019-10-16 RX ORDER — OXYBUTYNIN CHLORIDE 10 MG/1
10 TABLET, EXTENDED RELEASE ORAL DAILY
Qty: 90 TABLET | Refills: 1 | Status: SHIPPED | OUTPATIENT
Start: 2019-10-16 | End: 2020-04-27 | Stop reason: SDUPTHER

## 2019-10-16 ASSESSMENT — ENCOUNTER SYMPTOMS
WHEEZING: 0
COUGH: 0
ABDOMINAL PAIN: 0
SINUS PRESSURE: 0
CONSTIPATION: 0
VOMITING: 0
RHINORRHEA: 0
EYE REDNESS: 0
SORE THROAT: 0
EYE DISCHARGE: 0
TROUBLE SWALLOWING: 0
DIARRHEA: 0
NAUSEA: 0
SHORTNESS OF BREATH: 0

## 2019-10-16 ASSESSMENT — PATIENT HEALTH QUESTIONNAIRE - PHQ9
SUM OF ALL RESPONSES TO PHQ9 QUESTIONS 1 & 2: 0
SUM OF ALL RESPONSES TO PHQ QUESTIONS 1-9: 0
1. LITTLE INTEREST OR PLEASURE IN DOING THINGS: 0
SUM OF ALL RESPONSES TO PHQ QUESTIONS 1-9: 0
2. FEELING DOWN, DEPRESSED OR HOPELESS: 0

## 2020-04-27 RX ORDER — OXYBUTYNIN CHLORIDE 10 MG/1
10 TABLET, EXTENDED RELEASE ORAL DAILY
Qty: 90 TABLET | Refills: 1 | Status: SHIPPED | OUTPATIENT
Start: 2020-04-27 | End: 2020-10-30 | Stop reason: SDUPTHER

## 2020-04-28 ENCOUNTER — TELEPHONE (OUTPATIENT)
Dept: FAMILY MEDICINE CLINIC | Age: 85
End: 2020-04-28

## 2020-04-28 NOTE — TELEPHONE ENCOUNTER
pts son calling to let Nurse know that for oxybutynin needs to go to [address]. All other meds go to [facility]. Pt is having trouble with the oxybutynin getting sent to the right place. Son would just like for it not to be a hassle.  [phone]

## 2020-05-15 ENCOUNTER — HOSPITAL ENCOUNTER (OUTPATIENT)
Dept: LAB | Age: 85
Discharge: HOME | End: 2020-05-15
Payer: MEDICARE

## 2020-05-15 LAB
ABSOLUTE EOS #: 0.39 K/UL (ref 0–0.44)
ABSOLUTE IMMATURE GRANULOCYTE: 0.04 K/UL (ref 0–0.3)
ABSOLUTE LYMPH #: 1.69 K/UL (ref 1.1–3.7)
ABSOLUTE MONO #: 0.51 K/UL (ref 0.1–1.2)
ALBUMIN SERPL-MCNC: 3.7 G/DL (ref 3.5–5.2)
ALBUMIN/GLOBULIN RATIO: 1.2 (ref 1–2.5)
ALP BLD-CCNC: 74 U/L (ref 35–104)
ALT SERPL-CCNC: 29 U/L (ref 5–33)
ANION GAP SERPL CALCULATED.3IONS-SCNC: 11 MMOL/L (ref 9–17)
AST SERPL-CCNC: 48 U/L
BASOPHILS # BLD: 1 % (ref 0–2)
BASOPHILS ABSOLUTE: 0.06 K/UL (ref 0–0.2)
BILIRUB SERPL-MCNC: 0.4 MG/DL (ref 0.3–1.2)
BUN BLDV-MCNC: 34 MG/DL (ref 8–23)
BUN/CREAT BLD: 26 (ref 9–20)
CALCIUM SERPL-MCNC: 9.2 MG/DL (ref 8.6–10.4)
CHLORIDE BLD-SCNC: 104 MMOL/L (ref 98–107)
CHOLESTEROL/HDL RATIO: 2.4
CHOLESTEROL: 143 MG/DL
CO2: 26 MMOL/L (ref 20–31)
CREAT SERPL-MCNC: 1.29 MG/DL (ref 0.5–0.9)
DIFFERENTIAL TYPE: ABNORMAL
EOSINOPHILS RELATIVE PERCENT: 5 % (ref 1–4)
ESTIMATED AVERAGE GLUCOSE: 97 MG/DL
GFR AFRICAN AMERICAN: 48 ML/MIN
GFR NON-AFRICAN AMERICAN: 39 ML/MIN
GFR SERPL CREATININE-BSD FRML MDRD: ABNORMAL ML/MIN/{1.73_M2}
GFR SERPL CREATININE-BSD FRML MDRD: ABNORMAL ML/MIN/{1.73_M2}
GLUCOSE BLD-MCNC: 85 MG/DL (ref 70–99)
HBA1C MFR BLD: 5 % (ref 4.8–5.9)
HCT VFR BLD CALC: 40.6 % (ref 36.3–47.1)
HDLC SERPL-MCNC: 59 MG/DL
HEMOGLOBIN: 13 G/DL (ref 11.9–15.1)
IMMATURE GRANULOCYTES: 1 %
LDL CHOLESTEROL: 69 MG/DL (ref 0–130)
LYMPHOCYTES # BLD: 21 % (ref 24–43)
MCH RBC QN AUTO: 29.7 PG (ref 25.2–33.5)
MCHC RBC AUTO-ENTMCNC: 32 G/DL (ref 25.2–33.5)
MCV RBC AUTO: 92.7 FL (ref 82.6–102.9)
MONOCYTES # BLD: 6 % (ref 3–12)
NRBC AUTOMATED: 0 PER 100 WBC
PDW BLD-RTO: 12.8 % (ref 11.8–14.4)
PLATELET # BLD: 219 K/UL (ref 138–453)
PLATELET ESTIMATE: ABNORMAL
PMV BLD AUTO: 9.4 FL (ref 8.1–13.5)
POTASSIUM SERPL-SCNC: 4.6 MMOL/L (ref 3.7–5.3)
RBC # BLD: 4.38 M/UL (ref 3.95–5.11)
RBC # BLD: ABNORMAL 10*6/UL
SEG NEUTROPHILS: 66 % (ref 36–65)
SEGMENTED NEUTROPHILS ABSOLUTE COUNT: 5.49 K/UL (ref 1.5–8.1)
SODIUM BLD-SCNC: 141 MMOL/L (ref 135–144)
THYROXINE, FREE: 1.48 NG/DL (ref 0.93–1.7)
TOTAL PROTEIN: 6.8 G/DL (ref 6.4–8.3)
TRIGL SERPL-MCNC: 73 MG/DL
TSH SERPL DL<=0.05 MIU/L-ACNC: 0.67 MIU/L (ref 0.3–5)
VLDLC SERPL CALC-MCNC: NORMAL MG/DL (ref 1–30)
WBC # BLD: 8.2 K/UL (ref 3.5–11.3)
WBC # BLD: ABNORMAL 10*3/UL

## 2020-05-15 PROCEDURE — 84439 ASSAY OF FREE THYROXINE: CPT

## 2020-05-15 PROCEDURE — 84443 ASSAY THYROID STIM HORMONE: CPT

## 2020-05-15 PROCEDURE — 80061 LIPID PANEL: CPT

## 2020-05-15 PROCEDURE — 80053 COMPREHEN METABOLIC PANEL: CPT

## 2020-05-15 PROCEDURE — 36415 COLL VENOUS BLD VENIPUNCTURE: CPT

## 2020-05-15 PROCEDURE — 83036 HEMOGLOBIN GLYCOSYLATED A1C: CPT

## 2020-05-15 PROCEDURE — 85025 COMPLETE CBC W/AUTO DIFF WBC: CPT

## 2020-05-21 ENCOUNTER — OFFICE VISIT (OUTPATIENT)
Dept: FAMILY MEDICINE CLINIC | Age: 85
End: 2020-05-21
Payer: MEDICARE

## 2020-05-21 VITALS
HEIGHT: 59 IN | HEART RATE: 56 BPM | SYSTOLIC BLOOD PRESSURE: 130 MMHG | BODY MASS INDEX: 21.57 KG/M2 | DIASTOLIC BLOOD PRESSURE: 70 MMHG | WEIGHT: 107 LBS | RESPIRATION RATE: 16 BRPM

## 2020-05-21 PROCEDURE — 99214 OFFICE O/P EST MOD 30 MIN: CPT

## 2020-05-21 PROCEDURE — G8420 CALC BMI NORM PARAMETERS: HCPCS | Performed by: FAMILY MEDICINE

## 2020-05-21 PROCEDURE — 1123F ACP DISCUSS/DSCN MKR DOCD: CPT | Performed by: FAMILY MEDICINE

## 2020-05-21 PROCEDURE — G0446 INTENS BEHAVE THER CARDIO DX: HCPCS | Performed by: FAMILY MEDICINE

## 2020-05-21 PROCEDURE — 4040F PNEUMOC VAC/ADMIN/RCVD: CPT | Performed by: FAMILY MEDICINE

## 2020-05-21 PROCEDURE — 1036F TOBACCO NON-USER: CPT | Performed by: FAMILY MEDICINE

## 2020-05-21 PROCEDURE — G8427 DOCREV CUR MEDS BY ELIG CLIN: HCPCS | Performed by: FAMILY MEDICINE

## 2020-05-21 PROCEDURE — G0439 PPPS, SUBSEQ VISIT: HCPCS | Performed by: FAMILY MEDICINE

## 2020-05-21 PROCEDURE — 99213 OFFICE O/P EST LOW 20 MIN: CPT | Performed by: FAMILY MEDICINE

## 2020-05-21 PROCEDURE — G8400 PT W/DXA NO RESULTS DOC: HCPCS | Performed by: FAMILY MEDICINE

## 2020-05-21 PROCEDURE — 1090F PRES/ABSN URINE INCON ASSESS: CPT | Performed by: FAMILY MEDICINE

## 2020-05-21 RX ORDER — LOVASTATIN 20 MG/1
TABLET ORAL
Qty: 180 TABLET | Refills: 1 | Status: SHIPPED | OUTPATIENT
Start: 2020-05-21 | End: 2020-10-30 | Stop reason: SDUPTHER

## 2020-05-21 RX ORDER — METOPROLOL TARTRATE 50 MG/1
TABLET, FILM COATED ORAL
Qty: 180 TABLET | Refills: 1 | Status: SHIPPED | OUTPATIENT
Start: 2020-05-21 | End: 2020-10-30 | Stop reason: SDUPTHER

## 2020-05-21 RX ORDER — LEVOTHYROXINE SODIUM 0.07 MG/1
TABLET ORAL
Qty: 90 TABLET | Refills: 1 | Status: SHIPPED | OUTPATIENT
Start: 2020-05-21 | End: 2020-10-30 | Stop reason: SDUPTHER

## 2020-05-21 RX ORDER — LISINOPRIL 2.5 MG/1
TABLET ORAL
Qty: 90 TABLET | Refills: 1 | Status: SHIPPED | OUTPATIENT
Start: 2020-05-21 | End: 2020-10-30 | Stop reason: SDUPTHER

## 2020-05-21 RX ORDER — CLOPIDOGREL BISULFATE 75 MG/1
TABLET ORAL
Qty: 90 TABLET | Refills: 1 | Status: SHIPPED | OUTPATIENT
Start: 2020-05-21 | End: 2020-10-30 | Stop reason: SDUPTHER

## 2020-05-21 ASSESSMENT — ENCOUNTER SYMPTOMS
WHEEZING: 0
SHORTNESS OF BREATH: 0
SINUS PRESSURE: 0
SORE THROAT: 0
RHINORRHEA: 0
DIARRHEA: 0
COUGH: 0
TROUBLE SWALLOWING: 1
EYE DISCHARGE: 0
EYE REDNESS: 0
CONSTIPATION: 0
VOMITING: 0
NAUSEA: 0
ABDOMINAL PAIN: 0

## 2020-05-21 ASSESSMENT — LIFESTYLE VARIABLES: HOW OFTEN DO YOU HAVE A DRINK CONTAINING ALCOHOL: 0

## 2020-05-21 ASSESSMENT — PATIENT HEALTH QUESTIONNAIRE - PHQ9
SUM OF ALL RESPONSES TO PHQ QUESTIONS 1-9: 0
SUM OF ALL RESPONSES TO PHQ QUESTIONS 1-9: 0

## 2020-05-21 NOTE — PROGRESS NOTES
2020     Brittaney Ramos (:  1935) is a 82 y.o. female, here for evaluation of the following medical concerns:    HPI  Patient comes in today for follow up of chronic health issues Patient continues to have episodes of coughing with eating certain foods. She does have a known large Zenker's diverticulum and would be very extensive to have this repaired. At this point we did just talk about trying to work with foods that are better tolerated in order to prevent food from getting stuck in the area. Son notes particularly worse with cereals for some reason. She otherwise is able to tolerate most other foods but just has to eat much more slowly and cautiously. She has not had any choking episodes. She does have a known history of hypertension her blood pressure is stable with her current medical therapy. Has a known history of hyperlipidemia and her cholesterol levels remain stable and adequately controlled with her current statin medication. Has a known history of hypothyroidism her thyroid levels are stable and adequately supplemented on her current thyroid dose. Has known history of impaired fasting glucose but as she has cut down on dietary intake her blood sugar levels have run normal for the last several checks. Her weight is neutral compared to last check so I did encourage her just to continue to eat a healthy balanced diet but not to be too restrictive as I do not want her to continue to lose weight. Has a known history of CVA and has not had any changes as far as her neurologic symptoms. Has a known history of peripheral vascular disease but no acute issues at this time. Does continue to follow with vascular specialist and her son notes that they do have an appointment with him at [facility] in July but not sure of who they are exactly seeing due to the fact that Dr. Tom Oneil no longer comes to [facility].   Patient otherwise has no other acute medical Segs Absolute 5.49 1.50 - 8.10 k/uL    Absolute Lymph # 1.69 1.10 - 3.70 k/uL    Absolute Mono # 0.51 0.10 - 1.20 k/uL    Absolute Eos # 0.39 0.00 - 0.44 k/uL    Basophils Absolute 0.06 0.00 - 0.20 k/uL    Absolute Immature Granulocyte 0.04 0.00 - 0.30 k/uL    WBC Morphology NOT REPORTED     RBC Morphology NOT REPORTED     Platelet Estimate NOT REPORTED       Other review of systems are as noted below. Preventative measures are reviewed today. See health maintenance section for complete preventative plan of care. Did review patient's med list, allergies, social history, fam history, pmhx and pshx today as noted in the record. Review of Systems   Constitutional: Negative for chills, fatigue and fever. HENT: Positive for trouble swallowing. Negative for congestion, ear pain, postnasal drip, rhinorrhea, sinus pressure and sore throat. Eyes: Negative for discharge and redness. Respiratory: Negative for cough, shortness of breath and wheezing. Cardiovascular: Negative for chest pain. Gastrointestinal: Negative for abdominal pain, constipation, diarrhea, nausea and vomiting. Genitourinary: Negative for dysuria, flank pain, frequency and urgency. Musculoskeletal: Negative for arthralgias, myalgias and neck pain. Skin: Negative for rash and wound. Allergic/Immunologic: Negative for environmental allergies. Neurological: Negative for dizziness, weakness, light-headedness and headaches. Hematological: Negative for adenopathy. Psychiatric/Behavioral: Negative. Prior to Visit Medications    Medication Sig Taking?  Authorizing Provider   lovastatin (MEVACOR) 20 MG tablet TAKE 2 TABLETS BY MOUTH NIGHTLY Yes Héctor Glass, DO   metoprolol tartrate (LOPRESSOR) 50 MG tablet TAKE ONE TABLET BY MOUTH TWICE DAILY Yes Héctor Glass, DO   levothyroxine (SYNTHROID) 75 MCG tablet TAKE ONE TABLET BY MOUTH ONCE DAILY Yes Héctor Glass, DO   lisinopril (PRINIVIL;ZESTRIL) 2.5 MG tablet note.    --Jonathan Salazar, DO on 5/21/2020 at 10:03 AM

## 2020-10-30 RX ORDER — OXYBUTYNIN CHLORIDE 10 MG/1
10 TABLET, EXTENDED RELEASE ORAL DAILY
Qty: 90 TABLET | Refills: 1 | Status: CANCELLED | OUTPATIENT
Start: 2020-10-30

## 2020-10-30 RX ORDER — LOVASTATIN 20 MG/1
TABLET ORAL
Qty: 180 TABLET | Refills: 1 | Status: SHIPPED | OUTPATIENT
Start: 2020-10-30 | End: 2021-03-25 | Stop reason: SDUPTHER

## 2020-10-30 RX ORDER — CLOPIDOGREL BISULFATE 75 MG/1
TABLET ORAL
Qty: 90 TABLET | Refills: 1 | Status: SHIPPED | OUTPATIENT
Start: 2020-10-30 | End: 2021-03-25 | Stop reason: SDUPTHER

## 2020-10-30 RX ORDER — LEVOTHYROXINE SODIUM 0.07 MG/1
TABLET ORAL
Qty: 90 TABLET | Refills: 1 | Status: SHIPPED | OUTPATIENT
Start: 2020-10-30 | End: 2021-03-25 | Stop reason: SDUPTHER

## 2020-10-30 RX ORDER — OXYBUTYNIN CHLORIDE 10 MG/1
10 TABLET, EXTENDED RELEASE ORAL DAILY
Qty: 90 TABLET | Refills: 1 | Status: SHIPPED | OUTPATIENT
Start: 2020-10-30 | End: 2021-03-25 | Stop reason: SDUPTHER

## 2020-10-30 RX ORDER — LISINOPRIL 2.5 MG/1
TABLET ORAL
Qty: 90 TABLET | Refills: 1 | Status: SHIPPED | OUTPATIENT
Start: 2020-10-30 | End: 2021-03-25 | Stop reason: SDUPTHER

## 2020-10-30 RX ORDER — METOPROLOL TARTRATE 50 MG/1
TABLET, FILM COATED ORAL
Qty: 180 TABLET | Refills: 1 | Status: SHIPPED | OUTPATIENT
Start: 2020-10-30 | End: 2021-03-25 | Stop reason: SDUPTHER

## 2020-10-30 NOTE — TELEPHONE ENCOUNTER
Britta Robles called requesting a refill of the below medication which has been pended for you:     Requested Prescriptions     Pending Prescriptions Disp Refills    levothyroxine (SYNTHROID) 75 MCG tablet 90 tablet 1     Sig: TAKE ONE TABLET BY MOUTH ONCE DAILY    lovastatin (MEVACOR) 20 MG tablet 180 tablet 1     Sig: TAKE 2 TABLETS BY MOUTH NIGHTLY    metoprolol tartrate (LOPRESSOR) 50 MG tablet 180 tablet 1     Sig: TAKE ONE TABLET BY MOUTH TWICE DAILY    clopidogrel (PLAVIX) 75 MG tablet 90 tablet 1     Sig: TAKE ONE TABLET BY MOUTH ONCE DAILY    lisinopril (PRINIVIL;ZESTRIL) 2.5 MG tablet 90 tablet 1     Sig: TAKE ONE TABLET BY MOUTH ONCE DAILY    metFORMIN (GLUCOPHAGE) 500 MG tablet 90 tablet 1     Sig: TAKE ONE TABLET BY MOUTH ONCE DAILY WITH BREAKFAST    oxybutynin (DITROPAN-XL) 10 MG extended release tablet 90 tablet 1     Sig: Take 1 tablet by mouth daily       Last Appointment Date: 5/21/2020  Next Appointment Date: 12/10/2020    Allergies   Allergen Reactions    Other      She did have an IV antibiotic while she was in the hospital that she had a rash from, but son is unsure of the name of that. Otherwise, she has no known allergies.

## 2021-03-13 ENCOUNTER — HOSPITAL ENCOUNTER (OUTPATIENT)
Dept: LAB | Age: 86
Discharge: HOME | End: 2021-03-13
Payer: MEDICARE

## 2021-03-13 DIAGNOSIS — R73.01 IMPAIRED FASTING GLUCOSE: ICD-10-CM

## 2021-03-13 DIAGNOSIS — E03.9 ACQUIRED HYPOTHYROIDISM: ICD-10-CM

## 2021-03-13 DIAGNOSIS — E78.2 MIXED HYPERLIPIDEMIA: ICD-10-CM

## 2021-03-13 DIAGNOSIS — I10 ESSENTIAL HYPERTENSION: ICD-10-CM

## 2021-03-13 LAB
ABSOLUTE EOS #: 0.43 K/UL (ref 0–0.44)
ABSOLUTE IMMATURE GRANULOCYTE: <0.03 K/UL (ref 0–0.3)
ABSOLUTE LYMPH #: 1.32 K/UL (ref 1.1–3.7)
ABSOLUTE MONO #: 0.53 K/UL (ref 0.1–1.2)
ALBUMIN SERPL-MCNC: 3.9 G/DL (ref 3.5–5.2)
ALBUMIN/GLOBULIN RATIO: 1.1 (ref 1–2.5)
ALP BLD-CCNC: 87 U/L (ref 35–104)
ALT SERPL-CCNC: 30 U/L (ref 5–33)
ANION GAP SERPL CALCULATED.3IONS-SCNC: 7 MMOL/L (ref 9–17)
AST SERPL-CCNC: 47 U/L
BASOPHILS # BLD: 1 % (ref 0–2)
BASOPHILS ABSOLUTE: 0.07 K/UL (ref 0–0.2)
BILIRUB SERPL-MCNC: 0.42 MG/DL (ref 0.3–1.2)
BUN BLDV-MCNC: 29 MG/DL (ref 8–23)
BUN/CREAT BLD: 23 (ref 9–20)
CALCIUM SERPL-MCNC: 9.7 MG/DL (ref 8.6–10.4)
CHLORIDE BLD-SCNC: 103 MMOL/L (ref 98–107)
CHOLESTEROL/HDL RATIO: 2.4
CHOLESTEROL: 152 MG/DL
CO2: 29 MMOL/L (ref 20–31)
CREAT SERPL-MCNC: 1.26 MG/DL (ref 0.5–0.9)
DIFFERENTIAL TYPE: ABNORMAL
EOSINOPHILS RELATIVE PERCENT: 6 % (ref 1–4)
GFR AFRICAN AMERICAN: 49 ML/MIN
GFR NON-AFRICAN AMERICAN: 40 ML/MIN
GFR SERPL CREATININE-BSD FRML MDRD: ABNORMAL ML/MIN/{1.73_M2}
GFR SERPL CREATININE-BSD FRML MDRD: ABNORMAL ML/MIN/{1.73_M2}
GLUCOSE BLD-MCNC: 85 MG/DL (ref 70–99)
HCT VFR BLD CALC: 40.4 % (ref 36.3–47.1)
HDLC SERPL-MCNC: 64 MG/DL
HEMOGLOBIN: 12.6 G/DL (ref 11.9–15.1)
IMMATURE GRANULOCYTES: 0 %
LDL CHOLESTEROL: 76 MG/DL (ref 0–130)
LYMPHOCYTES # BLD: 20 % (ref 24–43)
MCH RBC QN AUTO: 29.6 PG (ref 25.2–33.5)
MCHC RBC AUTO-ENTMCNC: 31.2 G/DL (ref 25.2–33.5)
MCV RBC AUTO: 94.8 FL (ref 82.6–102.9)
MONOCYTES # BLD: 8 % (ref 3–12)
NRBC AUTOMATED: 0 PER 100 WBC
PDW BLD-RTO: 12.9 % (ref 11.8–14.4)
PLATELET # BLD: 212 K/UL (ref 138–453)
PLATELET ESTIMATE: ABNORMAL
PMV BLD AUTO: 9.5 FL (ref 8.1–13.5)
POTASSIUM SERPL-SCNC: 5.2 MMOL/L (ref 3.7–5.3)
RBC # BLD: 4.26 M/UL (ref 3.95–5.11)
RBC # BLD: ABNORMAL 10*6/UL
SEG NEUTROPHILS: 65 % (ref 36–65)
SEGMENTED NEUTROPHILS ABSOLUTE COUNT: 4.37 K/UL (ref 1.5–8.1)
SODIUM BLD-SCNC: 139 MMOL/L (ref 135–144)
THYROXINE, FREE: 1.68 NG/DL (ref 0.93–1.7)
TOTAL PROTEIN: 7.4 G/DL (ref 6.4–8.3)
TRIGL SERPL-MCNC: 58 MG/DL
TSH SERPL DL<=0.05 MIU/L-ACNC: 0.95 MIU/L (ref 0.3–5)
VLDLC SERPL CALC-MCNC: NORMAL MG/DL (ref 1–30)
WBC # BLD: 6.7 K/UL (ref 3.5–11.3)
WBC # BLD: ABNORMAL 10*3/UL

## 2021-03-13 PROCEDURE — 80061 LIPID PANEL: CPT

## 2021-03-13 PROCEDURE — 80053 COMPREHEN METABOLIC PANEL: CPT

## 2021-03-13 PROCEDURE — 83036 HEMOGLOBIN GLYCOSYLATED A1C: CPT

## 2021-03-13 PROCEDURE — 36415 COLL VENOUS BLD VENIPUNCTURE: CPT

## 2021-03-13 PROCEDURE — 85025 COMPLETE CBC W/AUTO DIFF WBC: CPT

## 2021-03-13 PROCEDURE — 84439 ASSAY OF FREE THYROXINE: CPT

## 2021-03-13 PROCEDURE — 84443 ASSAY THYROID STIM HORMONE: CPT

## 2021-03-14 LAB
ESTIMATED AVERAGE GLUCOSE: 103 MG/DL
HBA1C MFR BLD: 5.2 % (ref 4–6)

## 2021-03-25 ENCOUNTER — OFFICE VISIT (OUTPATIENT)
Dept: FAMILY MEDICINE CLINIC | Age: 86
End: 2021-03-25
Payer: MEDICARE

## 2021-03-25 ENCOUNTER — IMMUNIZATION (OUTPATIENT)
Dept: LAB | Age: 86
End: 2021-03-25
Payer: MEDICARE

## 2021-03-25 VITALS
RESPIRATION RATE: 12 BRPM | WEIGHT: 105 LBS | BODY MASS INDEX: 21.17 KG/M2 | HEART RATE: 56 BPM | DIASTOLIC BLOOD PRESSURE: 62 MMHG | SYSTOLIC BLOOD PRESSURE: 138 MMHG | HEIGHT: 59 IN

## 2021-03-25 DIAGNOSIS — E78.2 MIXED HYPERLIPIDEMIA: ICD-10-CM

## 2021-03-25 DIAGNOSIS — I73.9 PERIPHERAL VASCULAR DISEASE (HCC): ICD-10-CM

## 2021-03-25 DIAGNOSIS — I10 ESSENTIAL HYPERTENSION: Primary | ICD-10-CM

## 2021-03-25 DIAGNOSIS — I63.30 CEREBROVASCULAR ACCIDENT (CVA) DUE TO THROMBOSIS OF CEREBRAL ARTERY (HCC): ICD-10-CM

## 2021-03-25 DIAGNOSIS — E03.9 ACQUIRED HYPOTHYROIDISM: ICD-10-CM

## 2021-03-25 DIAGNOSIS — Z91.81 AT HIGH RISK FOR FALLS: ICD-10-CM

## 2021-03-25 DIAGNOSIS — R73.01 IMPAIRED FASTING GLUCOSE: ICD-10-CM

## 2021-03-25 PROCEDURE — G8400 PT W/DXA NO RESULTS DOC: HCPCS | Performed by: FAMILY MEDICINE

## 2021-03-25 PROCEDURE — G8484 FLU IMMUNIZE NO ADMIN: HCPCS | Performed by: FAMILY MEDICINE

## 2021-03-25 PROCEDURE — 1123F ACP DISCUSS/DSCN MKR DOCD: CPT | Performed by: FAMILY MEDICINE

## 2021-03-25 PROCEDURE — 1036F TOBACCO NON-USER: CPT | Performed by: FAMILY MEDICINE

## 2021-03-25 PROCEDURE — G8427 DOCREV CUR MEDS BY ELIG CLIN: HCPCS | Performed by: FAMILY MEDICINE

## 2021-03-25 PROCEDURE — 91301 COVID-19, MODERNA VACCINE 100MCG/0.5ML DOSE: CPT

## 2021-03-25 PROCEDURE — 4040F PNEUMOC VAC/ADMIN/RCVD: CPT | Performed by: FAMILY MEDICINE

## 2021-03-25 PROCEDURE — 1090F PRES/ABSN URINE INCON ASSESS: CPT | Performed by: FAMILY MEDICINE

## 2021-03-25 PROCEDURE — G8420 CALC BMI NORM PARAMETERS: HCPCS | Performed by: FAMILY MEDICINE

## 2021-03-25 PROCEDURE — 99214 OFFICE O/P EST MOD 30 MIN: CPT | Performed by: FAMILY MEDICINE

## 2021-03-25 RX ORDER — LEVOTHYROXINE SODIUM 0.07 MG/1
TABLET ORAL
Qty: 90 TABLET | Refills: 1 | Status: SHIPPED | OUTPATIENT
Start: 2021-03-25 | End: 2021-11-02

## 2021-03-25 RX ORDER — OXYBUTYNIN CHLORIDE 10 MG/1
10 TABLET, EXTENDED RELEASE ORAL DAILY
Qty: 90 TABLET | Refills: 1 | Status: SHIPPED | OUTPATIENT
Start: 2021-03-25 | End: 2021-11-01

## 2021-03-25 RX ORDER — METOPROLOL TARTRATE 50 MG/1
TABLET, FILM COATED ORAL
Qty: 180 TABLET | Refills: 1 | Status: SHIPPED | OUTPATIENT
Start: 2021-03-25 | End: 2021-11-02

## 2021-03-25 RX ORDER — LISINOPRIL 2.5 MG/1
TABLET ORAL
Qty: 90 TABLET | Refills: 1 | Status: SHIPPED | OUTPATIENT
Start: 2021-03-25 | End: 2021-11-02

## 2021-03-25 RX ORDER — LOVASTATIN 20 MG/1
TABLET ORAL
Qty: 180 TABLET | Refills: 1 | Status: SHIPPED | OUTPATIENT
Start: 2021-03-25 | End: 2021-11-02

## 2021-03-25 RX ORDER — CLOPIDOGREL BISULFATE 75 MG/1
TABLET ORAL
Qty: 90 TABLET | Refills: 1 | Status: SHIPPED | OUTPATIENT
Start: 2021-03-25 | End: 2021-11-02

## 2021-03-25 ASSESSMENT — ENCOUNTER SYMPTOMS
EYE DISCHARGE: 0
ABDOMINAL PAIN: 0
SINUS PRESSURE: 0
SHORTNESS OF BREATH: 0
NAUSEA: 0
VOMITING: 0
SORE THROAT: 0
DIARRHEA: 0
RHINORRHEA: 0
WHEEZING: 0
COUGH: 0
CONSTIPATION: 0
EYE REDNESS: 0
TROUBLE SWALLOWING: 0

## 2021-03-25 ASSESSMENT — PATIENT HEALTH QUESTIONNAIRE - PHQ9
1. LITTLE INTEREST OR PLEASURE IN DOING THINGS: 0
2. FEELING DOWN, DEPRESSED OR HOPELESS: 0
SUM OF ALL RESPONSES TO PHQ9 QUESTIONS 1 & 2: 0

## 2021-03-25 NOTE — PATIENT INSTRUCTIONS
Hospital Outpatient Visit on 03/13/2021   Component Date Value Ref Range Status    Thyroxine, Free 03/13/2021 1.68  0.93 - 1.70 ng/dL Final    TSH 03/13/2021 0.95  0.30 - 5.00 mIU/L Final    Cholesterol 03/13/2021 152  <200 mg/dL Final    Comment:    Cholesterol Guidelines:      <200  Desirable   200-240  Borderline      >240  Undesirable         HDL 03/13/2021 64  >40 mg/dL Final    Comment:    HDL Guidelines:    <40     Undesirable   40-59    Borderline    >59     Desirable         LDL Cholesterol 03/13/2021 76  0 - 130 mg/dL Final    Comment:    LDL Guidelines:     <100    Desirable   100-129   Near to/above Desirable   130-159   Borderline      >159   Undesirable     Direct (measured) LDL and calculated LDL are not interchangeable tests.  Chol/HDL Ratio 03/13/2021 2.4  <5 Final            Triglycerides 03/13/2021 58  <150 mg/dL Final    Comment:    Triglyceride Guidelines:     <150   Desirable   150-199  Borderline   200-499  High     >499   Very high   Based on AHA Guidelines for fasting triglyceride, October 2012.  VLDL 03/13/2021 NOT REPORTED  1 - 30 mg/dL Final    Hemoglobin A1C 03/13/2021 5.2  4.0 - 6.0 % Final    Estimated Avg Glucose 03/13/2021 103  mg/dL Final    Comment: The ADA and AACC recommend providing the estimated average glucose result to permit better   patient understanding of their HBA1c result.       Glucose 03/13/2021 85  70 - 99 mg/dL Final    BUN 03/13/2021 29* 8 - 23 mg/dL Final    CREATININE 03/13/2021 1.26* 0.50 - 0.90 mg/dL Final    Bun/Cre Ratio 03/13/2021 23* 9 - 20 Final    Calcium 03/13/2021 9.7  8.6 - 10.4 mg/dL Final    Sodium 03/13/2021 139  135 - 144 mmol/L Final    Potassium 03/13/2021 5.2  3.7 - 5.3 mmol/L Final    Chloride 03/13/2021 103  98 - 107 mmol/L Final    CO2 03/13/2021 29  20 - 31 mmol/L Final    Anion Gap 03/13/2021 7* 9 - 17 mmol/L Final    Alkaline Phosphatase 03/13/2021 87  35 - 104 U/L Final    ALT 03/13/2021 30  5 - 33 U/L Final    AST 03/13/2021 47* <32 U/L Final    Total Bilirubin 03/13/2021 0.42  0.3 - 1.2 mg/dL Final    Total Protein 03/13/2021 7.4  6.4 - 8.3 g/dL Final    Albumin 03/13/2021 3.9  3.5 - 5.2 g/dL Final    Albumin/Globulin Ratio 03/13/2021 1.1  1.0 - 2.5 Final    GFR Non- 03/13/2021 40* >60 mL/min Final    GFR  03/13/2021 49* >60 mL/min Final    GFR Comment 03/13/2021        Final    Comment: Average GFR for 79or more years old:   76 mL/min/1.73sq m  Chronic Kidney Disease:   <60 mL/min/1.73sq m  Kidney failure:   <15 mL/min/1.73sq m              eGFR calculated using average adult body mass.  Additional eGFR calculator available at:        [email or web address]            GFR Staging 03/13/2021 NOT REPORTED   Final    WBC 03/13/2021 6.7  3.5 - 11.3 k/uL Final    RBC 03/13/2021 4.26  3.95 - 5.11 m/uL Final    Hemoglobin 03/13/2021 12.6  11.9 - 15.1 g/dL Final    Hematocrit 03/13/2021 40.4  36.3 - 47.1 % Final    MCV 03/13/2021 94.8  82.6 - 102.9 fL Final    MCH 03/13/2021 29.6  25.2 - 33.5 pg Final    MCHC 03/13/2021 31.2  25.2 - 33.5 g/dL Final    RDW 03/13/2021 12.9  11.8 - 14.4 % Final    Platelets 71/09/4164 212  138 - 453 k/uL Final    MPV 03/13/2021 9.5  8.1 - 13.5 fL Final    NRBC Automated 03/13/2021 0.0  0.0 per 100 WBC Final    Differential Type 03/13/2021 NOT REPORTED   Final    Seg Neutrophils 03/13/2021 65  36 - 65 % Final    Lymphocytes 03/13/2021 20* 24 - 43 % Final    Monocytes 03/13/2021 8  3 - 12 % Final    Eosinophils % 03/13/2021 6* 1 - 4 % Final    Basophils 03/13/2021 1  0 - 2 % Final    Immature Granulocytes 03/13/2021 0  0 % Final    Segs Absolute 03/13/2021 4.37  1.50 - 8.10 k/uL Final    Absolute Lymph # 03/13/2021 1.32  1.10 - 3.70 k/uL Final    Absolute Mono # 03/13/2021 0.53  0.10 - 1.20 k/uL Final    Absolute Eos # 03/13/2021 0.43  0.00 - 0.44 k/uL Final    Basophils Absolute 03/13/2021 0.07  0.00 - 0.20 k/uL Final    Absolute Immature Granulocyte 03/13/2021 <0.03  0.00 - 0.30 k/uL Final    WBC Morphology 03/13/2021 NOT REPORTED   Final    RBC Morphology 03/13/2021 NOT REPORTED   Final    Platelet Estimate 98/81/1273 NOT REPORTED   Final   '  Patient Education        Preventing Falls: Care Instructions  Your Care Instructions     Getting around your home safely can be a challenge if you have injuries or health problems that make it easy for you to fall. Loose rugs and furniture in walkways are among the dangers for many older people who have problems walking or who have poor eyesight. People who have conditions such as arthritis, osteoporosis, or dementia also have to be careful not to fall. You can make your home safer with a few simple measures. Follow-up care is a key part of your treatment and safety. Be sure to make and go to all appointments, and call your doctor if you are having problems. It's also a good idea to know your test results and keep a list of the medicines you take. How can you care for yourself at home? Taking care of yourself  · You may get dizzy if you do not drink enough water. To prevent dehydration, drink plenty of fluids. Choose water and other caffeine-free clear liquids. If you have kidney, heart, or liver disease and have to limit fluids, talk with your doctor before you increase the amount of fluids you drink. · Exercise regularly to improve your strength, muscle tone, and balance. Walk if you can. Swimming may be a good choice if you cannot walk easily. · Have your vision and hearing checked each year or any time you notice a change. If you have trouble seeing and hearing, you might not be able to avoid objects and could lose your balance. · Know the side effects of the medicines you take. Ask your doctor or pharmacist whether the medicines you take can affect your balance. Sleeping pills or sedatives can affect your balance. · Limit the amount of alcohol you drink. Alcohol can impair your balance and other senses. · Ask your doctor whether calluses or corns on your feet need to be removed. If you wear loose-fitting shoes because of calluses or corns, you can lose your balance and fall. · Talk to your doctor if you have numbness in your feet. Preventing falls at home  · Remove raised doorway thresholds, throw rugs, and clutter. Repair loose carpet or raised areas in the floor. · Move furniture and electrical cords to keep them out of walking paths. · Use nonskid floor wax, and wipe up spills right away, especially on ceramic tile floors. · If you use a walker or cane, put rubber tips on it. If you use crutches, clean the bottoms of them regularly with an abrasive pad, such as steel wool. · Keep your house well lit, especially Naheed Marely, and outside walkways. Use night-lights in areas such as hallways and bathrooms. Add extra light switches or use remote switches (such as switches that go on or off when you clap your hands) to make it easier to turn lights on if you have to get up during the night. · Install sturdy handrails on stairways. · Move items in your cabinets so that the things you use a lot are on the lower shelves (about waist level). · Keep a cordless phone and a flashlight with new batteries by your bed. If possible, put a phone in each of the main rooms of your house, or carry a cell phone in case you fall and cannot reach a phone. Or, you can wear a device around your neck or wrist. You push a button that sends a signal for help. · Wear low-heeled shoes that fit well and give your feet good support. Use footwear with nonskid soles. Check the heels and soles of your shoes for wear. Repair or replace worn heels or soles. · Do not wear socks without shoes on wood floors. · Walk on the grass when the sidewalks are slippery. If you live in an area that gets snow and ice in the winter, sprinkle salt on slippery steps and sidewalks.   Preventing falls in the bath  · Install grab bars and nonskid mats inside and outside your shower or tub and near the toilet and sinks. · Use shower chairs and bath benches. · Use a hand-held shower head that will allow you to sit while showering. · Get into a tub or shower by putting the weaker leg in first. Get out of a tub or shower with your strong side first.  · Repair loose toilet seats and consider installing a raised toilet seat to make getting on and off the toilet easier. · Keep your bathroom door unlocked while you are in the shower. Where can you learn more? Go to [email or web address] org and sign in to your [employer] account. Enter [phone] in the [employer] box to learn more about \"Preventing Falls: Care Instructions. \"     If you do not have an account, please click on the \"Sign Up Now\" link. Current as of: December 7, 2020               Content Version: 12.8  © 2006-2021 Healthwise, Incorporated. Care instructions adapted under license by [facility] [place] ([facility] If you have questions about a medical condition or this instruction, always ask your healthcare professional. [address] any warranty or liability for your use of this information.

## 2021-03-25 NOTE — PROGRESS NOTES
3/25/2021     Evorn Rinne (:  1935) is a 82 y.o. female, here for evaluation of the following medical concerns:    HPI  Patient comes in today for follow up of chronic health issues Patient seems to be doing relatively well overall. Does not have any acute concerns to discuss today. Does have a known history of hypertension her blood pressure is stable and controlled on her current medical therapy. Has known hyperlipidemia and her cholesterol levels remain stable and controlled on her current statin dose. Has known hypothyroidism and her thyroid levels are stable and adequately supplemented on her current thyroid medication. Does have a known history of impaired fasting glucose and her blood sugar level remains within normal limits with dietary efforts. Did encourage continued low-carb/low sugar diet to keep this optimally controlled. Has a known history of CVA with residual effects but is stable at this time. Does have a known history of peripheral vascular disease without any acute issue at this time. Patient otherwise has no other acute medical concerns. .  Patient's recent lab reports are as follows:    Results for orders placed or performed during the hospital encounter of 21   T4, Free   Result Value Ref Range    Thyroxine, Free 1.68 0.93 - 1.70 ng/dL   TSH without Reflex   Result Value Ref Range    TSH 0.95 0.30 - 5.00 mIU/L   Lipid Panel   Result Value Ref Range    Cholesterol 152 <200 mg/dL    HDL 64 >40 mg/dL    LDL Cholesterol 76 0 - 130 mg/dL    Chol/HDL Ratio 2.4 <5    Triglycerides 58 <150 mg/dL    VLDL NOT REPORTED 1 - 30 mg/dL   Hemoglobin A1C   Result Value Ref Range    Hemoglobin A1C 5.2 4.0 - 6.0 %    Estimated Avg Glucose 103 mg/dL   Comprehensive Metabolic Panel   Result Value Ref Range    Glucose 85 70 - 99 mg/dL    BUN 29 (H) 8 - 23 mg/dL    CREATININE 1.26 (H) 0.50 - 0.90 mg/dL    Bun/Cre Ratio 23 (H) 9 - 20    Calcium 9.7 8.6 - 10.4 mg/dL    Sodium 139 135 - 144 mmol/L    Potassium 5.2 3.7 - 5.3 mmol/L    Chloride 103 98 - 107 mmol/L    CO2 29 20 - 31 mmol/L    Anion Gap 7 (L) 9 - 17 mmol/L    Alkaline Phosphatase 87 35 - 104 U/L    ALT 30 5 - 33 U/L    AST 47 (H) <32 U/L    Total Bilirubin 0.42 0.3 - 1.2 mg/dL    Total Protein 7.4 6.4 - 8.3 g/dL    Albumin 3.9 3.5 - 5.2 g/dL    Albumin/Globulin Ratio 1.1 1.0 - 2.5    GFR Non-African American 40 (L) >60 mL/min    GFR  49 (L) >60 mL/min    GFR Comment          GFR Staging NOT REPORTED    CBC Auto Differential   Result Value Ref Range    WBC 6.7 3.5 - 11.3 k/uL    RBC 4.26 3.95 - 5.11 m/uL    Hemoglobin 12.6 11.9 - 15.1 g/dL    Hematocrit 40.4 36.3 - 47.1 %    MCV 94.8 82.6 - 102.9 fL    MCH 29.6 25.2 - 33.5 pg    MCHC 31.2 25.2 - 33.5 g/dL    RDW 12.9 11.8 - 14.4 %    Platelets 172 729 - 641 k/uL    MPV 9.5 8.1 - 13.5 fL    NRBC Automated 0.0 0.0 per 100 WBC    Differential Type NOT REPORTED     Seg Neutrophils 65 36 - 65 %    Lymphocytes 20 (L) 24 - 43 %    Monocytes 8 3 - 12 %    Eosinophils % 6 (H) 1 - 4 %    Basophils 1 0 - 2 %    Immature Granulocytes 0 0 %    Segs Absolute 4.37 1.50 - 8.10 k/uL    Absolute Lymph # 1.32 1.10 - 3.70 k/uL    Absolute Mono # 0.53 0.10 - 1.20 k/uL    Absolute Eos # 0.43 0.00 - 0.44 k/uL    Basophils Absolute 0.07 0.00 - 0.20 k/uL    Absolute Immature Granulocyte <0.03 0.00 - 0.30 k/uL    WBC Morphology NOT REPORTED     RBC Morphology NOT REPORTED     Platelet Estimate NOT REPORTED       Other review of systems are as noted below. Preventative measures are reviewed today. See health maintenance section for complete preventative plan of care. Did review patient's med list, allergies, social history, fam history, pmhx and pshx today as noted in the record. Review of Systems   Constitutional: Negative for chills, fatigue and fever. HENT: Negative for congestion, ear pain, postnasal drip, rhinorrhea, sinus pressure, sore throat and trouble swallowing.     Eyes: Social History     Tobacco Use    Smoking status: Former Smoker     Packs/day: 1.00     Years: 50.00     Pack years: 50.00     Types: Cigarettes    Smokeless tobacco: Never Used    Tobacco comment: Smoked 1 pack a day for 50 years' duration but quit some years ago. Substance Use Topics    Alcohol use: No        There were no vitals filed for this visit. Estimated body mass index is 21.61 kg/m² as calculated from the following:    Height as of 5/21/20: 4' 11\" (1.499 m). Weight as of 5/21/20: 107 lb (48.5 kg). Physical Exam  Vitals signs and nursing note reviewed. Constitutional:       General: She is not in acute distress. Appearance: Normal appearance. She is well-developed. She is not diaphoretic. HENT:      Head: Normocephalic and atraumatic. Right Ear: Tympanic membrane, ear canal and external ear normal.      Left Ear: Tympanic membrane, ear canal and external ear normal.      Nose: Nose normal.      Mouth/Throat:      Mouth: Mucous membranes are moist.      Pharynx: Oropharynx is clear. No oropharyngeal exudate. Eyes:      General:         Right eye: No discharge. Left eye: No discharge. Conjunctiva/sclera: Conjunctivae normal.      Pupils: Pupils are equal, round, and reactive to light. Neck:      Musculoskeletal: Normal range of motion and neck supple. Thyroid: No thyromegaly. Cardiovascular:      Rate and Rhythm: Normal rate and regular rhythm. Heart sounds: Normal heart sounds. Pulmonary:      Effort: Pulmonary effort is normal.      Breath sounds: Normal breath sounds. No wheezing or rales. Abdominal:      General: Bowel sounds are normal. There is no distension. Palpations: Abdomen is soft. Tenderness: There is no abdominal tenderness. Lymphadenopathy:      Cervical: No cervical adenopathy. Skin:     General: Skin is warm and dry. Findings: No rash.    Neurological:      Mental Status: She is alert and oriented to person, place, and time. Psychiatric:         Behavior: Behavior normal.         Thought Content: Thought content normal.         Judgment: Judgment normal.           ASSESSMENT/PLAN:  Encounter Diagnoses   Name Primary?     Essential hypertension Yes    Mixed hyperlipidemia     Acquired hypothyroidism     Impaired fasting glucose     Cerebrovascular accident (CVA) due to thrombosis of cerebral artery (HCC)     Peripheral vascular disease (HCC)     At high risk for falls      Orders Placed This Encounter   Medications    metFORMIN (GLUCOPHAGE) 500 MG tablet     Sig: Take 1 tablet by mouth daily (with breakfast)     Dispense:  90 tablet     Refill:  1    oxybutynin (DITROPAN-XL) 10 MG extended release tablet     Sig: Take 1 tablet by mouth daily     Dispense:  90 tablet     Refill:  1    lisinopril (PRINIVIL;ZESTRIL) 2.5 MG tablet     Sig: TAKE ONE TABLET BY MOUTH ONCE DAILY     Dispense:  90 tablet     Refill:  1    lovastatin (MEVACOR) 20 MG tablet     Sig: TAKE 2 TABLETS BY MOUTH NIGHTLY     Dispense:  180 tablet     Refill:  1    levothyroxine (SYNTHROID) 75 MCG tablet     Sig: TAKE ONE TABLET BY MOUTH ONCE DAILY     Dispense:  90 tablet     Refill:  1    metoprolol tartrate (LOPRESSOR) 50 MG tablet     Sig: TAKE ONE TABLET BY MOUTH TWICE DAILY     Dispense:  180 tablet     Refill:  1    clopidogrel (PLAVIX) 75 MG tablet     Sig: TAKE ONE TABLET BY MOUTH ONCE DAILY     Dispense:  90 tablet     Refill:  1     Orders Placed This Encounter   Procedures    CBC Auto Differential     Standing Status:   Future     Standing Expiration Date:   3/25/2022    Comprehensive Metabolic Panel     Standing Status:   Future     Standing Expiration Date:   3/25/2022    Hemoglobin A1C     Standing Status:   Future     Standing Expiration Date:   3/25/2022    Lipid Panel     Standing Status:   Future     Standing Expiration Date:   3/25/2022     Order Specific Question:   Is Patient Fasting?/# of Hours     Answer:   15 hours    TSH without Reflex     Standing Status:   Future     Standing Expiration Date:   3/25/2022    T4, Free     Standing Status:   Future     Standing Expiration Date:   3/25/2022       On the basis of positive falls risk screening, assessment and plan is as follows: home safety tips provided. Patient is to continue on her current medical therapy. No additional changes are made at this time. Patient is to continue to follow a low-carb/low sugar/low-fat diet and increase activity as able for optimal blood sugar and cholesterol control. Patient is to return to my office in 6 months duration or sooner if any further problems or symptoms arise. (Please note that portions of this note were completed with a voice recognition program. Efforts were made to edit the dictations but occasionally words are mis-transcribed.)      No follow-ups on file. An electronic signature was used to authenticate this note.     --Autumn Whitley, DO on 3/25/2021 at 7:15 AM

## 2021-04-22 ENCOUNTER — IMMUNIZATION (OUTPATIENT)
Dept: LAB | Age: 86
End: 2021-04-22
Payer: MEDICARE

## 2021-04-22 PROCEDURE — 91301 COVID-19, MODERNA VACCINE 100MCG/0.5ML DOSE: CPT

## 2021-04-29 NOTE — TELEPHONE ENCOUNTER
Also has known Zenkers Diverticulum. Has had swallow study in the past.  Ok to repeat for further evaluation. Hydroxychloroquine Counseling:  I discussed with the patient that a baseline ophthalmologic exam is needed at the start of therapy and every year thereafter while on therapy. A CBC may also be warranted for monitoring.  The side effects of this medication were discussed with the patient, including but not limited to agranulocytosis, aplastic anemia, seizures, rashes, retinopathy, and liver toxicity. Patient instructed to call the office should any adverse effect occur.  The patient verbalized understanding of the proper use and possible adverse effects of Plaquenil.  All the patient's questions and concerns were addressed. Clindamycin Counseling: I counseled the patient regarding use of clindamycin as an antibiotic for prophylactic and/or therapeutic purposes. Clindamycin is active against numerous classes of bacteria, including skin bacteria. Side effects may include nausea, diarrhea, gastrointestinal upset, rash, hives, yeast infections, and in rare cases, colitis. Cosentyx Counseling:  I discussed with the patient the risks of Cosentyx including but not limited to worsening of Crohn's disease, immunosuppression, allergic reactions and infections.  The patient understands that monitoring is required including a PPD at baseline and must alert us or the primary physician if symptoms of infection or other concerning signs are noted. Simponi Pregnancy And Lactation Text: The risk during pregnancy and breastfeeding is uncertain with this medication. Cyclosporine Pregnancy And Lactation Text: This medication is Pregnancy Category C and it isn't know if it is safe during pregnancy. This medication is excreted in breast milk. Tetracycline Counseling: Patient counseled regarding possible photosensitivity and increased risk for sunburn.  Patient instructed to avoid sunlight, if possible.  When exposed to sunlight, patients should wear protective clothing, sunglasses, and sunscreen.  The patient was instructed to call the office immediately if the following severe adverse effects occur:  hearing changes, easy bruising/bleeding, severe headache, or vision changes.  The patient verbalized understanding of the proper use and possible adverse effects of tetracycline.  All of the patient's questions and concerns were addressed. Patient understands to avoid pregnancy while on therapy due to potential birth defects. Minoxidil Counseling: Minoxidil is a topical medication which can increase blood flow where it is applied. It is uncertain how this medication increases hair growth. Side effects are uncommon and include stinging and allergic reactions. Carac Counseling:  I discussed with the patient the risks of Carac including but not limited to erythema, scaling, itching, weeping, crusting, and pain. Thalidomide Pregnancy And Lactation Text: This medication is Pregnancy Category X and is absolutely contraindicated during pregnancy. It is unknown if it is excreted in breast milk. Sarecycline Pregnancy And Lactation Text: This medication is Pregnancy Category D and not consider safe during pregnancy. It is also excreted in breast milk. Carac Pregnancy And Lactation Text: This medication is Pregnancy Category X and contraindicated in pregnancy and in women who may become pregnant. It is unknown if this medication is excreted in breast milk. Skyrizi Counseling: I discussed with the patient the risks of risankizumab-rzaa including but not limited to immunosuppression, and serious infections.  The patient understands that monitoring is required including a PPD at baseline and must alert us or the primary physician if symptoms of infection or other concerning signs are noted. Hydroxychloroquine Pregnancy And Lactation Text: This medication has been shown to cause fetal harm but it isn't assigned a Pregnancy Risk Category. There are small amounts excreted in breast milk. Cosentyx Pregnancy And Lactation Text: This medication is Pregnancy Category B and is considered safe during pregnancy. It is unknown if this medication is excreted in breast milk. Valtrex Counseling: I discussed with the patient the risks of valacyclovir including but not limited to kidney damage, nausea, vomiting and severe allergy.  The patient understands that if the infection seems to be worsening or is not improving, they are to call. Minoxidil Pregnancy And Lactation Text: This medication has not been assigned a Pregnancy Risk Category but animal studies failed to show danger with the topical medication. It is unknown if the medication is excreted in breast milk. Topical Clindamycin Counseling: Patient counseled that this medication may cause skin irritation or allergic reactions.  In the event of skin irritation, the patient was advised to reduce the amount of the drug applied or use it less frequently.   The patient verbalized understanding of the proper use and possible adverse effects of clindamycin.  All of the patient's questions and concerns were addressed. Methotrexate Counseling:  Patient counseled regarding adverse effects of methotrexate including but not limited to nausea, vomiting, abnormalities in liver function tests. Patients may develop mouth sores, rash, diarrhea, and abnormalities in blood counts. The patient understands that monitoring is required including LFT's and blood counts.  There is a rare possibility of scarring of the liver and lung problems that can occur when taking methotrexate. Persistent nausea, loss of appetite, pale stools, dark urine, cough, and shortness of breath should be reported immediately. Patient advised to discontinue methotrexate treatment at least three months before attempting to become pregnant.  I discussed the need for folate supplements while taking methotrexate.  These supplements can decrease side effects during methotrexate treatment. The patient verbalized understanding of the proper use and possible adverse effects of methotrexate.  All of the patient's questions and concerns were addressed. Doxycycline Counseling:  Patient counseled regarding possible photosensitivity and increased risk for sunburn.  Patient instructed to avoid sunlight, if possible.  When exposed to sunlight, patients should wear protective clothing, sunglasses, and sunscreen.  The patient was instructed to call the office immediately if the following severe adverse effects occur:  hearing changes, easy bruising/bleeding, severe headache, or vision changes.  The patient verbalized understanding of the proper use and possible adverse effects of doxycycline.  All of the patient's questions and concerns were addressed. Methotrexate Pregnancy And Lactation Text: This medication is Pregnancy Category X and is known to cause fetal harm. This medication is excreted in breast milk. 5-Fu Counseling: 5-Fluorouracil Counseling:  I discussed with the patient the risks of 5-fluorouracil including but not limited to erythema, scaling, itching, weeping, crusting, and pain. Picato Counseling:  I discussed with the patient the risks of Picato including but not limited to erythema, scaling, itching, weeping, crusting, and pain. Doxepin Counseling:  Patient advised that the medication is sedating and not to drive a car after taking this medication. Patient informed of potential adverse effects including but not limited to dry mouth, urinary retention, and blurry vision.  The patient verbalized understanding of the proper use and possible adverse effects of doxepin.  All of the patient's questions and concerns were addressed. Dupixent Counseling: I discussed with the patient the risks of dupilumab including but not limited to eye infection and irritation, cold sores, injection site reactions, worsening of asthma, allergic reactions and increased risk of parasitic infection.  Live vaccines should be avoided while taking dupilumab. Dupilumab will also interact with certain medications such as warfarin and cyclosporine. The patient understands that monitoring is required and they must alert us or the primary physician if symptoms of infection or other concerning signs are noted. Nsaids Counseling: NSAID Counseling: I discussed with the patient that NSAIDs should be taken with food. Prolonged use of NSAIDs can result in the development of stomach ulcers.  Patient advised to stop taking NSAIDs if abdominal pain occurs.  The patient verbalized understanding of the proper use and possible adverse effects of NSAIDs.  All of the patient's questions and concerns were addressed. Valtrex Pregnancy And Lactation Text: this medication is Pregnancy Category B and is considered safe during pregnancy. This medication is not directly found in breast milk but it's metabolite acyclovir is present. Clindamycin Pregnancy And Lactation Text: This medication can be used in pregnancy if certain situations. Clindamycin is also present in breast milk. Stelara Counseling:  I discussed with the patient the risks of ustekinumab including but not limited to immunosuppression, malignancy, posterior leukoencephalopathy syndrome, and serious infections.  The patient understands that monitoring is required including a PPD at baseline and must alert us or the primary physician if symptoms of infection or other concerning signs are noted. Topical Clindamycin Pregnancy And Lactation Text: This medication is Pregnancy Category B and is considered safe during pregnancy. It is unknown if it is excreted in breast milk. Dupixent Pregnancy And Lactation Text: This medication likely crosses the placenta but the risk for the fetus is uncertain. This medication is excreted in breast milk. Fluconazole Counseling:  Patient counseled regarding adverse effects of fluconazole including but not limited to headache, diarrhea, nausea, upset stomach, liver function test abnormalities, taste disturbance, and stomach pain.  There is a rare possibility of liver failure that can occur when taking fluconazole.  The patient understands that monitoring of LFTs and kidney function test may be required, especially at baseline. The patient verbalized understanding of the proper use and possible adverse effects of fluconazole.  All of the patient's questions and concerns were addressed. Doxycycline Pregnancy And Lactation Text: This medication is Pregnancy Category D and not consider safe during pregnancy. It is also excreted in breast milk but is considered safe for shorter treatment courses. Use Enhanced Medication Counseling?: No Doxepin Pregnancy And Lactation Text: This medication is Pregnancy Category C and it isn't known if it is safe during pregnancy. It is also excreted in breast milk and breast feeding isn't recommended. Nsaids Pregnancy And Lactation Text: These medications are considered safe up to 30 weeks gestation. It is excreted in breast milk. Picato Pregnancy And Lactation Text: This medication is Pregnancy Category C. It is unknown if this medication is excreted in breast milk. Prednisone Counseling:  I discussed with the patient the risks of prolonged use of prednisone including but not limited to weight gain, insomnia, osteoporosis, mood changes, diabetes, susceptibility to infection, glaucoma and high blood pressure.  In cases where prednisone use is prolonged, patients should be monitored with blood pressure checks, serum glucose levels and an eye exam.  Additionally, the patient may need to be placed on GI prophylaxis, PCP prophylaxis, and calcium and vitamin D supplementation and/or a bisphosphonate.  The patient verbalized understanding of the proper use and the possible adverse effects of prednisone.  All of the patient's questions and concerns were addressed. Tremfya Counseling: I discussed with the patient the risks of guselkumab including but not limited to immunosuppression, serious infections, worsening of inflammatory bowel disease and drug reactions.  The patient understands that monitoring is required including a PPD at baseline and must alert us or the primary physician if symptoms of infection or other concerning signs are noted. Clofazimine Counseling:  I discussed with the patient the risks of clofazimine including but not limited to skin and eye pigmentation, liver damage, nausea/vomiting, gastrointestinal bleeding and allergy. Clofazimine Pregnancy And Lactation Text: This medication is Pregnancy Category C and isn't considered safe during pregnancy. It is excreted in breast milk. Drysol Counseling:  I discussed with the patient the risks of drysol/aluminum chloride including but not limited to skin rash, itching, irritation, burning. Fluconazole Pregnancy And Lactation Text: This medication is Pregnancy Category C and it isn't know if it is safe during pregnancy. It is also excreted in breast milk. Protopic Counseling: Patient may experience a mild burning sensation during topical application. Protopic is not approved in children less than 2 years of age. There have been case reports of hematologic and skin malignancies in patients using topical calcineurin inhibitors although causality is questionable. Enbrel Counseling:  I discussed with the patient the risks of etanercept including but not limited to myelosuppression, immunosuppression, autoimmune hepatitis, demyelinating diseases, lymphoma, and infections.  The patient understands that monitoring is required including a PPD at baseline and must alert us or the primary physician if symptoms of infection or other concerning signs are noted. Hydroxyzine Counseling: Patient advised that the medication is sedating and not to drive a car after taking this medication.  Patient informed of potential adverse effects including but not limited to dry mouth, urinary retention, and blurry vision.  The patient verbalized understanding of the proper use and possible adverse effects of hydroxyzine.  All of the patient's questions and concerns were addressed. Odomzo Counseling- I discussed with the patient the risks of Odomzo including but not limited to nausea, vomiting, diarrhea, constipation, weight loss, changes in the sense of taste, decreased appetite, muscle spasms, and hair loss.  The patient verbalized understanding of the proper use and possible adverse effects of Odomzo.  All of the patient's questions and concerns were addressed. Griseofulvin Counseling:  I discussed with the patient the risks of griseofulvin including but not limited to photosensitivity, cytopenia, liver damage, nausea/vomiting and severe allergy.  The patient understands that this medication is best absorbed when taken with a fatty meal (e.g., ice cream or french fries). Erythromycin Counseling:  I discussed with the patient the risks of erythromycin including but not limited to GI upset, allergic reaction, drug rash, diarrhea, increase in liver enzymes, and yeast infections. Hydroxyzine Pregnancy And Lactation Text: This medication is not safe during pregnancy and should not be taken. It is also excreted in breast milk and breast feeding isn't recommended. Xeljanz Counseling: I discussed with the patient the risks of Xeljanz therapy including increased risk of infection, liver issues, headache, diarrhea, or cold symptoms. Live vaccines should be avoided. They were instructed to call if they have any problems. Taltz Counseling: I discussed with the patient the risks of ixekizumab including but not limited to immunosuppression, serious infections, worsening of inflammatory bowel disease and drug reactions.  The patient understands that monitoring is required including a PPD at baseline and must alert us or the primary physician if symptoms of infection or other concerning signs are noted. Drysol Pregnancy And Lactation Text: This medication is considered safe during pregnancy and breast feeding. Protopic Pregnancy And Lactation Text: This medication is Pregnancy Category C. It is unknown if this medication is excreted in breast milk when applied topically. Topical Sulfur Applications Counseling: Topical Sulfur Counseling: Patient counseled that this medication may cause skin irritation or allergic reactions.  In the event of skin irritation, the patient was advised to reduce the amount of the drug applied or use it less frequently.   The patient verbalized understanding of the proper use and possible adverse effects of topical sulfur application.  All of the patient's questions and concerns were addressed. Colchicine Counseling:  Patient counseled regarding adverse effects including but not limited to stomach upset (nausea, vomiting, stomach pain, or diarrhea).  Patient instructed to limit alcohol consumption while taking this medication.  Colchicine may reduce blood counts especially with prolonged use.  The patient understands that monitoring of kidney function and blood counts may be required, especially at baseline. The patient verbalized understanding of the proper use and possible adverse effects of colchicine.  All of the patient's questions and concerns were addressed. Otezla Pregnancy And Lactation Text: This medication is Pregnancy Category C and it isn't known if it is safe during pregnancy. It is unknown if it is excreted in breast milk. Erythromycin Pregnancy And Lactation Text: This medication is Pregnancy Category B and is considered safe during pregnancy. It is also excreted in breast milk. Humira Counseling:  I discussed with the patient the risks of adalimumab including but not limited to myelosuppression, immunosuppression, autoimmune hepatitis, demyelinating diseases, lymphoma, and serious infections.  The patient understands that monitoring is required including a PPD at baseline and must alert us or the primary physician if symptoms of infection or other concerning signs are noted. Griseofulvin Pregnancy And Lactation Text: This medication is Pregnancy Category X and is known to cause serious birth defects. It is unknown if this medication is excreted in breast milk but breast feeding should be avoided. Solaraze Counseling:  I discussed with the patient the risks of Solaraze including but not limited to erythema, scaling, itching, weeping, crusting, and pain. Otezla Counseling: The side effects of Otezla were discussed with the patient, including but not limited to worsening or new depression, weight loss, diarrhea, nausea, upper respiratory tract infection, and headache. Patient instructed to call the office should any adverse effect occur.  The patient verbalized understanding of the proper use and possible adverse effects of Otezla.  All the patient's questions and concerns were addressed. Acitretin Counseling:  I discussed with the patient the risks of acitretin including but not limited to hair loss, dry lips/skin/eyes, liver damage, hyperlipidemia, depression/suicidal ideation, photosensitivity.  Serious rare side effects can include but are not limited to pancreatitis, pseudotumor cerebri, bony changes, clot formation/stroke/heart attack.  Patient understands that alcohol is contraindicated since it can result in liver toxicity and significantly prolong the elimination of the drug by many years. Xelbaldomeroz Pregnancy And Lactation Text: This medication is Pregnancy Category D and is not considered safe during pregnancy.  The risk during breast feeding is also uncertain. Topical Retinoid counseling:  Patient advised to apply a pea-sized amount only at bedtime and wait 30 minutes after washing their face before applying.  If too drying, patient may add a non-comedogenic moisturizer. The patient verbalized understanding of the proper use and possible adverse effects of retinoids.  All of the patient's questions and concerns were addressed. Bexarotene Counseling:  I discussed with the patient the risks of bexarotene including but not limited to hair loss, dry lips/skin/eyes, liver abnormalities, hyperlipidemia, pancreatitis, depression/suicidal ideation, photosensitivity, drug rash/allergic reactions, hypothyroidism, anemia, leukopenia, infection, cataracts, and teratogenicity.  Patient understands that they will need regular blood tests to check lipid profile, liver function tests, white blood cell count, thyroid function tests and pregnancy test if applicable. Xolair Pregnancy And Lactation Text: This medication is Pregnancy Category B and is considered safe during pregnancy. This medication is excreted in breast milk. Detail Level: Detailed Itraconazole Counseling:  I discussed with the patient the risks of itraconazole including but not limited to liver damage, nausea/vomiting, neuropathy, and severe allergy.  The patient understands that this medication is best absorbed when taken with acidic beverages such as non-diet cola or ginger ale.  The patient understands that monitoring is required including baseline LFTs and repeat LFTs at intervals.  The patient understands that they are to contact us or the primary physician if concerning signs are noted. Acitretin Pregnancy And Lactation Text: This medication is Pregnancy Category X and should not be given to women who are pregnant or may become pregnant in the future. This medication is excreted in breast milk. Xolair Counseling:  Patient informed of potential adverse effects including but not limited to fever, muscle aches, rash and allergic reactions.  The patient verbalized understanding of the proper use and possible adverse effects of Xolair.  All of the patient's questions and concerns were addressed. Albendazole Counseling:  I discussed with the patient the risks of albendazole including but not limited to cytopenia, kidney damage, nausea/vomiting and severe allergy.  The patient understands that this medication is being used in an off-label manner. Metronidazole Counseling:  I discussed with the patient the risks of metronidazole including but not limited to seizures, nausea/vomiting, a metallic taste in the mouth, nausea/vomiting and severe allergy. Elidel Counseling: Patient may experience a mild burning sensation during topical application. Elidel is not approved in children less than 2 years of age. There have been case reports of hematologic and skin malignancies in patients using topical calcineurin inhibitors although causality is questionable. Solaraze Pregnancy And Lactation Text: This medication is Pregnancy Category B and is considered safe. There is some data to suggest avoiding during the third trimester. It is unknown if this medication is excreted in breast milk. Topical Sulfur Applications Pregnancy And Lactation Text: This medication is Pregnancy Category C and has an unknown safety profile during pregnancy. It is unknown if this topical medication is excreted in breast milk. Zyclara Counseling:  I discussed with the patient the risks of imiquimod including but not limited to erythema, scaling, itching, weeping, crusting, and pain.  Patient understands that the inflammatory response to imiquimod is variable from person to person and was educated regarded proper titration schedule.  If flu-like symptoms develop, patient knows to discontinue the medication and contact us. Ivermectin Counseling:  Patient instructed to take medication on an empty stomach with a full glass of water.  Patient informed of potential adverse effects including but not limited to nausea, diarrhea, dizziness, itching, and swelling of the extremities or lymph nodes.  The patient verbalized understanding of the proper use and possible adverse effects of ivermectin.  All of the patient's questions and concerns were addressed. Dapsone Counseling: I discussed with the patient the risks of dapsone including but not limited to hemolytic anemia, agranulocytosis, rashes, methemoglobinemia, kidney failure, peripheral neuropathy, headaches, GI upset, and liver toxicity.  Patients who start dapsone require monitoring including baseline LFTs and weekly CBCs for the first month, then every month thereafter.  The patient verbalized understanding of the proper use and possible adverse effects of dapsone.  All of the patient's questions and concerns were addressed. Albendazole Pregnancy And Lactation Text: This medication is Pregnancy Category C and it isn't known if it is safe during pregnancy. It is also excreted in breast milk. Ilumya Counseling: I discussed with the patient the risks of tildrakizumab including but not limited to immunosuppression, malignancy, posterior leukoencephalopathy syndrome, and serious infections.  The patient understands that monitoring is required including a PPD at baseline and must alert us or the primary physician if symptoms of infection or other concerning signs are noted. Metronidazole Pregnancy And Lactation Text: This medication is Pregnancy Category B and considered safe during pregnancy.  It is also excreted in breast milk. Oxybutynin Counseling:  I discussed with the patient the risks of oxybutynin including but not limited to skin rash, drowsiness, dry mouth, difficulty urinating, and blurred vision. Birth Control Pills Counseling: Birth Control Pill Counseling: I discussed with the patient the potential side effects of OCPs including but not limited to increased risk of stroke, heart attack, thrombophlebitis, deep venous thrombosis, hepatic adenomas, breast changes, GI upset, headaches, and depression.  The patient verbalized understanding of the proper use and possible adverse effects of OCPs. All of the patient's questions and concerns were addressed. Azithromycin Counseling:  I discussed with the patient the risks of azithromycin including but not limited to GI upset, allergic reaction, drug rash, diarrhea, and yeast infections. Isotretinoin Counseling: Patient should get monthly blood tests, not donate blood, not drive at night if vision affected, not share medication, and not undergo elective surgery for 6 months after tx completed. Side effects reviewed, pt to contact office should one occur. Azathioprine Counseling:  I discussed with the patient the risks of azathioprine including but not limited to myelosuppression, immunosuppression, hepatotoxicity, lymphoma, and infections.  The patient understands that monitoring is required including baseline LFTs, Creatinine, possible TPMP genotyping and weekly CBCs for the first month and then every 2 weeks thereafter.  The patient verbalized understanding of the proper use and possible adverse effects of azathioprine.  All of the patient's questions and concerns were addressed. Minocycline Counseling: Patient advised regarding possible photosensitivity and discoloration of the teeth, skin, lips, tongue and gums.  Patient instructed to avoid sunlight, if possible.  When exposed to sunlight, patients should wear protective clothing, sunglasses, and sunscreen.  The patient was instructed to call the office immediately if the following severe adverse effects occur:  hearing changes, easy bruising/bleeding, severe headache, or vision changes.  The patient verbalized understanding of the proper use and possible adverse effects of minocycline.  All of the patient's questions and concerns were addressed. Bexarotene Pregnancy And Lactation Text: This medication is Pregnancy Category X and should not be given to women who are pregnant or may become pregnant. This medication should not be used if you are breast feeding. Dapsone Pregnancy And Lactation Text: This medication is Pregnancy Category C and is not considered safe during pregnancy or breast feeding. Ketoconazole Counseling:   Patient counseled regarding improving absorption with orange juice.  Adverse effects include but are not limited to breast enlargement, headache, diarrhea, nausea, upset stomach, liver function test abnormalities, taste disturbance, and stomach pain.  There is a rare possibility of liver failure that can occur when taking ketoconazole. The patient understands that monitoring of LFTs may be required, especially at baseline. The patient verbalized understanding of the proper use and possible adverse effects of ketoconazole.  All of the patient's questions and concerns were addressed. Isotretinoin Pregnancy And Lactation Text: This medication is Pregnancy Category X and is considered extremely dangerous during pregnancy. It is unknown if it is excreted in breast milk. Erivedge Counseling- I discussed with the patient the risks of Erivedge including but not limited to nausea, vomiting, diarrhea, constipation, weight loss, changes in the sense of taste, decreased appetite, muscle spasms, and hair loss.  The patient verbalized understanding of the proper use and possible adverse effects of Erivedge.  All of the patient's questions and concerns were addressed. Azathioprine Pregnancy And Lactation Text: This medication is Pregnancy Category D and isn't considered safe during pregnancy. It is unknown if this medication is excreted in breast milk. Azithromycin Pregnancy And Lactation Text: This medication is considered safe during pregnancy and is also secreted in breast milk. Birth Control Pills Pregnancy And Lactation Text: This medication should be avoided if pregnant and for the first 30 days post-partum. Eucrisa Counseling: Patient may experience a mild burning sensation during topical application. Eucrisa is not approved in children less than 2 years of age. Ketoconazole Pregnancy And Lactation Text: This medication is Pregnancy Category C and it isn't know if it is safe during pregnancy. It is also excreted in breast milk and breast feeding isn't recommended. Infliximab Counseling:  I discussed with the patient the risks of infliximab including but not limited to myelosuppression, immunosuppression, autoimmune hepatitis, demyelinating diseases, lymphoma, and serious infections.  The patient understands that monitoring is required including a PPD at baseline and must alert us or the primary physician if symptoms of infection or other concerning signs are noted. Rituxan Counseling:  I discussed with the patient the risks of Rituxan infusions. Side effects can include infusion reactions, severe drug rashes including mucocutaneous reactions, reactivation of latent hepatitis and other infections and rarely progressive multifocal leukoencephalopathy.  All of the patient's questions and concerns were addressed. Terbinafine Pregnancy And Lactation Text: This medication is Pregnancy Category B and is considered safe during pregnancy. It is also excreted in breast milk and breast feeding isn't recommended. Spironolactone Counseling: Patient advised regarding risks of diarrhea, abdominal pain, hyperkalemia, birth defects (for female patients), liver toxicity and renal toxicity. The patient may need blood work to monitor liver and kidney function and potassium levels while on therapy. The patient verbalized understanding of the proper use and possible adverse effects of spironolactone.  All of the patient's questions and concerns were addressed. Terbinafine Counseling: Patient counseling regarding adverse effects of terbinafine including but not limited to headache, diarrhea, rash, upset stomach, liver function test abnormalities, itching, taste/smell disturbance, nausea, abdominal pain, and flatulence.  There is a rare possibility of liver failure that can occur when taking terbinafine.  The patient understands that a baseline LFT and kidney function test may be required. The patient verbalized understanding of the proper use and possible adverse effects of terbinafine.  All of the patient's questions and concerns were addressed. Quinolones Counseling:  I discussed with the patient the risks of fluoroquinolones including but not limited to GI upset, allergic reaction, drug rash, diarrhea, dizziness, photosensitivity, yeast infections, liver function test abnormalities, tendonitis/tendon rupture. High Dose Vitamin A Pregnancy And Lactation Text: High dose vitamin A therapy is contraindicated during pregnancy and breast feeding. Spironolactone Pregnancy And Lactation Text: This medication can cause feminization of the male fetus and should be avoided during pregnancy. The active metabolite is also found in breast milk. Gabapentin Counseling: I discussed with the patient the risks of gabapentin including but not limited to dizziness, somnolence, fatigue and ataxia. Bactrim Counseling:  I discussed with the patient the risks of sulfa antibiotics including but not limited to GI upset, allergic reaction, drug rash, diarrhea, dizziness, photosensitivity, and yeast infections.  Rarely, more serious reactions can occur including but not limited to aplastic anemia, agranulocytosis, methemoglobinemia, blood dyscrasias, liver or kidney failure, lung infiltrates or desquamative/blistering drug rashes. Tazorac Counseling:  Patient advised that medication is irritating and drying.  Patient may need to apply sparingly and wash off after an hour before eventually leaving it on overnight.  The patient verbalized understanding of the proper use and possible adverse effects of tazorac.  All of the patient's questions and concerns were addressed. Cellcept Counseling:  I discussed with the patient the risks of mycophenolate mofetil including but not limited to infection/immunosuppression, GI upset, hypokalemia, hypercholesterolemia, bone marrow suppression, lymphoproliferative disorders, malignancy, GI ulceration/bleed/perforation, colitis, interstitial lung disease, kidney failure, progressive multifocal leukoencephalopathy, and birth defects.  The patient understands that monitoring is required including a baseline creatinine and regular CBC testing. In addition, patient must alert us immediately if symptoms of infection or other concerning signs are noted. Hydroquinone Counseling:  Patient advised that medication may result in skin irritation, lightening (hypopigmentation), dryness, and burning.  In the event of skin irritation, the patient was advised to reduce the amount of the drug applied or use it less frequently.  Rarely, spots that are treated with hydroquinone can become darker (pseudoochronosis).  Should this occur, patient instructed to stop medication and call the office. The patient verbalized understanding of the proper use and possible adverse effects of hydroquinone.  All of the patient's questions and concerns were addressed. High Dose Vitamin A Counseling: Side effects reviewed, pt to contact office should one occur. Siliq Counseling:  I discussed with the patient the risks of Siliq including but not limited to new or worsening depression, suicidal thoughts and behavior, immunosuppression, malignancy, posterior leukoencephalopathy syndrome, and serious infections.  The patient understands that monitoring is required including a PPD at baseline and must alert us or the primary physician if symptoms of infection or other concerning signs are noted. There is also a special program designed to monitor depression which is required with Siliq. Cyclophosphamide Counseling:  I discussed with the patient the risks of cyclophosphamide including but not limited to hair loss, hormonal abnormalities, decreased fertility, abdominal pain, diarrhea, nausea and vomiting, bone marrow suppression and infection. The patient understands that monitoring is required while taking this medication. Rifampin Pregnancy And Lactation Text: This medication is Pregnancy Category C and it isn't know if it is safe during pregnancy. It is also excreted in breast milk and should not be used if you are breast feeding. Bactrim Pregnancy And Lactation Text: This medication is Pregnancy Category D and is known to cause fetal risk.  It is also excreted in breast milk. SSKI Counseling:  I discussed with the patient the risks of SSKI including but not limited to thyroid abnormalities, metallic taste, GI upset, fever, headache, acne, arthralgias, paraesthesias, lymphadenopathy, easy bleeding, arrhythmias, and allergic reaction. Rifampin Counseling: I discussed with the patient the risks of rifampin including but not limited to liver damage, kidney damage, red-orange body fluids, nausea/vomiting and severe allergy. Rituxan Pregnancy And Lactation Text: This medication is Pregnancy Category C and it isn't know if it is safe during pregnancy. It is unknown if this medication is excreted in breast milk but similar antibodies are known to be excreted. Benzoyl Peroxide Counseling: Patient counseled that medicine may cause skin irritation and bleach clothing.  In the event of skin irritation, the patient was advised to reduce the amount of the drug applied or use it less frequently.   The patient verbalized understanding of the proper use and possible adverse effects of benzoyl peroxide.  All of the patient's questions and concerns were addressed. Sarecycline Counseling: Patient advised regarding possible photosensitivity and discoloration of the teeth, skin, lips, tongue and gums.  Patient instructed to avoid sunlight, if possible.  When exposed to sunlight, patients should wear protective clothing, sunglasses, and sunscreen.  The patient was instructed to call the office immediately if the following severe adverse effects occur:  hearing changes, easy bruising/bleeding, severe headache, or vision changes.  The patient verbalized understanding of the proper use and possible adverse effects of sarecycline.  All of the patient's questions and concerns were addressed. Cimzia Counseling:  I discussed with the patient the risks of Cimzia including but not limited to immunosuppression, allergic reactions and infections.  The patient understands that monitoring is required including a PPD at baseline and must alert us or the primary physician if symptoms of infection or other concerning signs are noted. Sski Pregnancy And Lactation Text: This medication is Pregnancy Category D and isn't considered safe during pregnancy. It is excreted in breast milk. Cyclophosphamide Pregnancy And Lactation Text: This medication is Pregnancy Category D and it isn't considered safe during pregnancy. This medication is excreted in breast milk. Imiquimod Counseling:  I discussed with the patient the risks of imiquimod including but not limited to erythema, scaling, itching, weeping, crusting, and pain.  Patient understands that the inflammatory response to imiquimod is variable from person to person and was educated regarded proper titration schedule.  If flu-like symptoms develop, patient knows to discontinue the medication and contact us. Tazorac Pregnancy And Lactation Text: This medication is not safe during pregnancy. It is unknown if this medication is excreted in breast milk. Cimetidine Counseling:  I discussed with the patient the risks of Cimetidine including but not limited to gynecomastia, headache, diarrhea, nausea, drowsiness, arrhythmias, pancreatitis, skin rashes, psychosis, bone marrow suppression and kidney toxicity. Cyclosporine Counseling:  I discussed with the patient the risks of cyclosporine including but not limited to hypertension, gingival hyperplasia,myelosuppression, immunosuppression, liver damage, kidney damage, neurotoxicity, lymphoma, and serious infections. The patient understands that monitoring is required including baseline blood pressure, CBC, CMP, lipid panel and uric acid, and then 1-2 times monthly CMP and blood pressure. Simponi Counseling:  I discussed with the patient the risks of golimumab including but not limited to myelosuppression, immunosuppression, autoimmune hepatitis, demyelinating diseases, lymphoma, and serious infections.  The patient understands that monitoring is required including a PPD at baseline and must alert us or the primary physician if symptoms of infection or other concerning signs are noted. Cephalexin Pregnancy And Lactation Text: This medication is Pregnancy Category B and considered safe during pregnancy.  It is also excreted in breast milk but can be used safely for shorter doses. Glycopyrrolate Pregnancy And Lactation Text: This medication is Pregnancy Category B and is considered safe during pregnancy. It is unknown if it is excreted breast milk. Cimzia Pregnancy And Lactation Text: This medication crosses the placenta but can be considered safe in certain situations. Cimzia may be excreted in breast milk. Benzoyl Peroxide Pregnancy And Lactation Text: This medication is Pregnancy Category C. It is unknown if benzoyl peroxide is excreted in breast milk. Arava Counseling:  Patient counseled regarding adverse effects of Arava including but not limited to nausea, vomiting, abnormalities in liver function tests. Patients may develop mouth sores, rash, diarrhea, and abnormalities in blood counts. The patient understands that monitoring is required including LFTs and blood counts.  There is a rare possibility of scarring of the liver and lung problems that can occur when taking methotrexate. Persistent nausea, loss of appetite, pale stools, dark urine, cough, and shortness of breath should be reported immediately. Patient advised to discontinue Arava treatment and consult with a physician prior to attempting conception. The patient will have to undergo a treatment to eliminate Arava from the body prior to conception. Thalidomide Counseling: I discussed with the patient the risks of thalidomide including but not limited to birth defects, anxiety, weakness, chest pain, dizziness, cough and severe allergy. Glycopyrrolate Counseling:  I discussed with the patient the risks of glycopyrrolate including but not limited to skin rash, drowsiness, dry mouth, difficulty urinating, and blurred vision. Cephalexin Counseling: I counseled the patient regarding use of cephalexin as an antibiotic for prophylactic and/or therapeutic purposes. Cephalexin (commonly prescribed under brand name Keflex) is a cephalosporin antibiotic which is active against numerous classes of bacteria, including most skin bacteria. Side effects may include nausea, diarrhea, gastrointestinal upset, rash, hives, yeast infections, and in rare cases, hepatitis, kidney disease, seizures, fever, confusion, neurologic symptoms, and others. Patients with severe allergies to penicillin medications are cautioned that there is about a 10% incidence of cross-reactivity with cephalosporins. When possible, patients with penicillin allergies should use alternatives to cephalosporins for antibiotic therapy.

## 2021-10-19 ENCOUNTER — HOSPITAL ENCOUNTER (OUTPATIENT)
Dept: LAB | Age: 86
Discharge: HOME | End: 2021-10-19
Payer: MEDICARE

## 2021-10-19 DIAGNOSIS — I10 ESSENTIAL HYPERTENSION: ICD-10-CM

## 2021-10-19 DIAGNOSIS — E03.9 ACQUIRED HYPOTHYROIDISM: ICD-10-CM

## 2021-10-19 DIAGNOSIS — R73.01 IMPAIRED FASTING GLUCOSE: ICD-10-CM

## 2021-10-19 DIAGNOSIS — E78.2 MIXED HYPERLIPIDEMIA: ICD-10-CM

## 2021-10-19 LAB
ABSOLUTE EOS #: 0.39 K/UL (ref 0–0.44)
ABSOLUTE IMMATURE GRANULOCYTE: 0.05 K/UL (ref 0–0.3)
ABSOLUTE LYMPH #: 1.88 K/UL (ref 1.1–3.7)
ABSOLUTE MONO #: 0.56 K/UL (ref 0.1–1.2)
ALBUMIN SERPL-MCNC: 3.5 G/DL (ref 3.5–5.2)
ALBUMIN/GLOBULIN RATIO: 1 (ref 1–2.5)
ALP BLD-CCNC: 89 U/L (ref 35–104)
ALT SERPL-CCNC: 19 U/L (ref 5–33)
ANION GAP SERPL CALCULATED.3IONS-SCNC: 6 MMOL/L (ref 9–17)
AST SERPL-CCNC: 34 U/L
BASOPHILS # BLD: 1 % (ref 0–2)
BASOPHILS ABSOLUTE: 0.07 K/UL (ref 0–0.2)
BILIRUB SERPL-MCNC: 0.4 MG/DL (ref 0.3–1.2)
BUN BLDV-MCNC: 29 MG/DL (ref 8–23)
BUN/CREAT BLD: 26 (ref 9–20)
CALCIUM SERPL-MCNC: 9 MG/DL (ref 8.6–10.4)
CHLORIDE BLD-SCNC: 109 MMOL/L (ref 98–107)
CHOLESTEROL/HDL RATIO: 2.2
CHOLESTEROL: 130 MG/DL
CO2: 27 MMOL/L (ref 20–31)
CREAT SERPL-MCNC: 1.11 MG/DL (ref 0.5–0.9)
DIFFERENTIAL TYPE: ABNORMAL
EOSINOPHILS RELATIVE PERCENT: 4 % (ref 1–4)
GFR AFRICAN AMERICAN: 57 ML/MIN
GFR NON-AFRICAN AMERICAN: 47 ML/MIN
GFR SERPL CREATININE-BSD FRML MDRD: ABNORMAL ML/MIN/{1.73_M2}
GFR SERPL CREATININE-BSD FRML MDRD: ABNORMAL ML/MIN/{1.73_M2}
GLUCOSE BLD-MCNC: 97 MG/DL (ref 70–99)
HCT VFR BLD CALC: 36.7 % (ref 36.3–47.1)
HDLC SERPL-MCNC: 59 MG/DL
HEMOGLOBIN: 11.5 G/DL (ref 11.9–15.1)
IMMATURE GRANULOCYTES: 1 %
LDL CHOLESTEROL: 60 MG/DL (ref 0–130)
LYMPHOCYTES # BLD: 20 % (ref 24–43)
MCH RBC QN AUTO: 30.3 PG (ref 25.2–33.5)
MCHC RBC AUTO-ENTMCNC: 31.3 G/DL (ref 25.2–33.5)
MCV RBC AUTO: 96.8 FL (ref 82.6–102.9)
MONOCYTES # BLD: 6 % (ref 3–12)
NRBC AUTOMATED: 0 PER 100 WBC
PDW BLD-RTO: 13.2 % (ref 11.8–14.4)
PLATELET # BLD: 270 K/UL (ref 138–453)
PLATELET ESTIMATE: ABNORMAL
PMV BLD AUTO: 8.9 FL (ref 8.1–13.5)
POTASSIUM SERPL-SCNC: 4.4 MMOL/L (ref 3.7–5.3)
RBC # BLD: 3.79 M/UL (ref 3.95–5.11)
RBC # BLD: ABNORMAL 10*6/UL
SEG NEUTROPHILS: 68 % (ref 36–65)
SEGMENTED NEUTROPHILS ABSOLUTE COUNT: 6.38 K/UL (ref 1.5–8.1)
SODIUM BLD-SCNC: 142 MMOL/L (ref 135–144)
THYROXINE, FREE: 1.68 NG/DL (ref 0.93–1.7)
TOTAL PROTEIN: 6.9 G/DL (ref 6.4–8.3)
TRIGL SERPL-MCNC: 56 MG/DL
TSH SERPL DL<=0.05 MIU/L-ACNC: 0.45 MIU/L (ref 0.3–5)
VLDLC SERPL CALC-MCNC: NORMAL MG/DL (ref 1–30)
WBC # BLD: 9.3 K/UL (ref 3.5–11.3)
WBC # BLD: ABNORMAL 10*3/UL

## 2021-10-19 PROCEDURE — 85025 COMPLETE CBC W/AUTO DIFF WBC: CPT

## 2021-10-19 PROCEDURE — 84443 ASSAY THYROID STIM HORMONE: CPT

## 2021-10-19 PROCEDURE — 80053 COMPREHEN METABOLIC PANEL: CPT

## 2021-10-19 PROCEDURE — 83036 HEMOGLOBIN GLYCOSYLATED A1C: CPT

## 2021-10-19 PROCEDURE — 80061 LIPID PANEL: CPT

## 2021-10-19 PROCEDURE — 84439 ASSAY OF FREE THYROXINE: CPT

## 2021-10-19 PROCEDURE — 36415 COLL VENOUS BLD VENIPUNCTURE: CPT

## 2021-10-20 LAB
ESTIMATED AVERAGE GLUCOSE: 97 MG/DL
HBA1C MFR BLD: 5 % (ref 4–6)

## 2021-10-21 ENCOUNTER — OFFICE VISIT (OUTPATIENT)
Dept: FAMILY MEDICINE CLINIC | Age: 86
End: 2021-10-21
Payer: MEDICARE

## 2021-10-21 VITALS
OXYGEN SATURATION: 100 % | TEMPERATURE: 97.5 F | BODY MASS INDEX: 19.69 KG/M2 | RESPIRATION RATE: 14 BRPM | WEIGHT: 97.5 LBS | SYSTOLIC BLOOD PRESSURE: 138 MMHG | HEART RATE: 61 BPM | DIASTOLIC BLOOD PRESSURE: 64 MMHG

## 2021-10-21 DIAGNOSIS — R73.01 IMPAIRED FASTING GLUCOSE: ICD-10-CM

## 2021-10-21 DIAGNOSIS — E78.2 MIXED HYPERLIPIDEMIA: ICD-10-CM

## 2021-10-21 DIAGNOSIS — I10 ESSENTIAL HYPERTENSION: Primary | ICD-10-CM

## 2021-10-21 DIAGNOSIS — Z23 NEED FOR INFLUENZA VACCINATION: ICD-10-CM

## 2021-10-21 DIAGNOSIS — E03.9 ACQUIRED HYPOTHYROIDISM: ICD-10-CM

## 2021-10-21 PROCEDURE — G8427 DOCREV CUR MEDS BY ELIG CLIN: HCPCS | Performed by: FAMILY MEDICINE

## 2021-10-21 PROCEDURE — 1036F TOBACCO NON-USER: CPT | Performed by: FAMILY MEDICINE

## 2021-10-21 PROCEDURE — 4040F PNEUMOC VAC/ADMIN/RCVD: CPT | Performed by: FAMILY MEDICINE

## 2021-10-21 PROCEDURE — 1123F ACP DISCUSS/DSCN MKR DOCD: CPT | Performed by: FAMILY MEDICINE

## 2021-10-21 PROCEDURE — 1090F PRES/ABSN URINE INCON ASSESS: CPT | Performed by: FAMILY MEDICINE

## 2021-10-21 PROCEDURE — PBSHW INFLUENZA, QUADV, ADJUVANTED, 65 YRS +, IM, PF, PREFILL SYR, 0.5ML (FLUAD): Performed by: FAMILY MEDICINE

## 2021-10-21 PROCEDURE — 99212 OFFICE O/P EST SF 10 MIN: CPT

## 2021-10-21 PROCEDURE — G8420 CALC BMI NORM PARAMETERS: HCPCS | Performed by: FAMILY MEDICINE

## 2021-10-21 PROCEDURE — G8484 FLU IMMUNIZE NO ADMIN: HCPCS | Performed by: FAMILY MEDICINE

## 2021-10-21 PROCEDURE — 90471 IMMUNIZATION ADMIN: CPT | Performed by: FAMILY MEDICINE

## 2021-10-21 PROCEDURE — G8400 PT W/DXA NO RESULTS DOC: HCPCS | Performed by: FAMILY MEDICINE

## 2021-10-21 PROCEDURE — 99214 OFFICE O/P EST MOD 30 MIN: CPT | Performed by: FAMILY MEDICINE

## 2021-10-21 ASSESSMENT — ENCOUNTER SYMPTOMS
VOMITING: 0
ABDOMINAL PAIN: 0
EYE DISCHARGE: 0
EYE REDNESS: 0
CONSTIPATION: 0
SORE THROAT: 0
WHEEZING: 0
SINUS PRESSURE: 0
DIARRHEA: 0
COUGH: 0
RHINORRHEA: 0
NAUSEA: 0
SHORTNESS OF BREATH: 0
TROUBLE SWALLOWING: 0

## 2021-10-21 NOTE — PROGRESS NOTES
10/21/2021     Giovanny Corbin (:  1935) is a 82 y.o. female, here for evaluation of the following medical concerns:    HPI  Patient comes in today for follow up of chronic health issues Patient continues to lose weight but she does have a lot of difficulty swallowing secondary to a known Zenker's diverticulum. She did go to speech therapy a couple years ago and at that point did do a little better with her swallowing but has worsened somewhat. She can do anything soft foods but just does not eat a lot of meat. She does do some nutritional supplemental shakes to get some extra calories. I did suggest that as she does do a lot of puddings and soft foods perhaps mixing some protein powder and with those may help with getting some additional caloric intake. She does have slight anemia and this likely is due to the fact that she has decreased iron intake. Would try to get this through supplemental shakes. She has a known history of hypertension and her blood pressure is stable and controlled on her current medical therapy. Has a known history of hyperlipidemia and her cholesterol levels are stable and adequately controlled on her current statin dose. Does have a known history of hypothyroidism and her thyroid levels are stable and adequately supplemented on her current thyroid dose. Has a known history of impaired fasting glucose and her blood sugar level is within reasonable range and stable with continued limited dietary intake. Otherwise today no other acute medical concerns. .  Patient's recent lab reports are as follows:    Results for orders placed or performed during the hospital encounter of 10/19/21   T4, Free   Result Value Ref Range    Thyroxine, Free 1.68 0.93 - 1.70 ng/dL   TSH without Reflex   Result Value Ref Range    TSH 0.45 0.30 - 5.00 mIU/L   Lipid Panel   Result Value Ref Range    Cholesterol 130 <200 mg/dL    HDL 59 >40 mg/dL    LDL Cholesterol 60 0 - 130 mg/dL    Chol/HDL below.    Preventative measures are reviewed today. See health maintenance section for complete preventative plan of care. Did review patient's med list, allergies, social history, fam history, pmhx and pshx today as noted in the record. Review of Systems   Constitutional: Negative for chills, fatigue and fever. HENT: Negative for congestion, ear pain, postnasal drip, rhinorrhea, sinus pressure, sore throat and trouble swallowing. Eyes: Negative for discharge and redness. Respiratory: Negative for cough, shortness of breath and wheezing. Cardiovascular: Negative for chest pain. Gastrointestinal: Negative for abdominal pain, constipation, diarrhea, nausea and vomiting. Genitourinary: Negative for dysuria, flank pain, frequency and urgency. Musculoskeletal: Negative for arthralgias, myalgias and neck pain. Skin: Negative for rash and wound. Allergic/Immunologic: Negative for environmental allergies. Neurological: Negative for dizziness, weakness, light-headedness and headaches. Hematological: Negative for adenopathy. Psychiatric/Behavioral: Negative. Prior to Visit Medications    Medication Sig Taking?  Authorizing Provider   metFORMIN (GLUCOPHAGE) 500 MG tablet Take 1 tablet by mouth daily (with breakfast)  Susan Conklin, DO   oxybutynin (DITROPAN-XL) 10 MG extended release tablet Take 1 tablet by mouth daily  Silvia Teixeira, DO   lisinopril (PRINIVIL;ZESTRIL) 2.5 MG tablet TAKE ONE TABLET BY MOUTH ONCE DAILY  Susan Conklin, DO   lovastatin (MEVACOR) 20 MG tablet TAKE 2 TABLETS BY MOUTH NIGHTLY  Silvia Teixeira, DO   levothyroxine (SYNTHROID) 75 MCG tablet TAKE ONE TABLET BY MOUTH ONCE DAILY  Susan Conklin, DO   metoprolol tartrate (LOPRESSOR) 50 MG tablet TAKE ONE TABLET BY MOUTH TWICE DAILY  Susan Conklin, DO   clopidogrel (PLAVIX) 75 MG tablet TAKE ONE TABLET BY MOUTH ONCE DAILY  Susan Conklin, DO   NONFORMULARY Relaxing leg cream uses prn active ingred: gnaphalium polycephalum 6X HPUS (pain reliever) and causticum 8X HPUS (nighttime sleep aid)  Historical Provider, MD   Calcium Carbonate-Vitamin D (CALCIUM 600 + D PO) Take 1 tablet by mouth 2 times daily. Historical Provider, MD   aspirin 325 MG EC tablet Take 325 mg by mouth daily. Historical Provider, MD   Multiple Vitamins-Minerals (MULTIVITAMIN PO) Take 1 tablet by mouth daily. Multivitamin with iron  Historical Provider, MD        Social History     Tobacco Use    Smoking status: Former Smoker     Packs/day: 1.00     Years: 50.00     Pack years: 50.00     Types: Cigarettes    Smokeless tobacco: Never Used    Tobacco comment: Smoked 1 pack a day for 50 years' duration but quit some years ago. Substance Use Topics    Alcohol use: No        There were no vitals filed for this visit. Estimated body mass index is 21.21 kg/m² as calculated from the following:    Height as of 3/25/21: 4' 11\" (1.499 m). Weight as of 3/25/21: 105 lb (47.6 kg). Physical Exam  Vitals and nursing note reviewed. Constitutional:       General: She is not in acute distress. Appearance: Normal appearance. She is well-developed. She is not diaphoretic. HENT:      Head: Normocephalic and atraumatic. Right Ear: Tympanic membrane, ear canal and external ear normal.      Left Ear: Tympanic membrane, ear canal and external ear normal.      Nose: Nose normal.      Mouth/Throat:      Mouth: Mucous membranes are moist.      Pharynx: Oropharynx is clear. No oropharyngeal exudate. Eyes:      General:         Right eye: No discharge. Left eye: No discharge. Conjunctiva/sclera: Conjunctivae normal.      Pupils: Pupils are equal, round, and reactive to light. Neck:      Thyroid: No thyromegaly. Cardiovascular:      Rate and Rhythm: Normal rate and regular rhythm. Heart sounds: Normal heart sounds. Pulmonary:      Effort: Pulmonary effort is normal.      Breath sounds: Normal breath sounds.  No wheezing or rales. Abdominal:      General: Bowel sounds are normal. There is no distension. Palpations: Abdomen is soft. Tenderness: There is no abdominal tenderness. Musculoskeletal:      Cervical back: Normal range of motion and neck supple. Lymphadenopathy:      Cervical: No cervical adenopathy. Skin:     General: Skin is warm and dry. Findings: No rash. Neurological:      Mental Status: She is alert and oriented to person, place, and time. Psychiatric:         Behavior: Behavior normal.         Thought Content: Thought content normal.         Judgment: Judgment normal.           ASSESSMENT/PLAN:    Encounter Diagnoses   Name Primary?  Essential hypertension Yes    Mixed hyperlipidemia     Acquired hypothyroidism     Impaired fasting glucose     Need for influenza vaccination      No orders of the defined types were placed in this encounter. Orders Placed This Encounter   Procedures    INFLUENZA, QUADV, ADJUVANTED, 65 YRS =, IM, PF, PREFILL SYR, 0.5ML (FLUAD)    CBC Auto Differential     Standing Status:   Future     Standing Expiration Date:   10/21/2022    Comprehensive Metabolic Panel     Standing Status:   Future     Standing Expiration Date:   10/21/2022    Hemoglobin A1C     Standing Status:   Future     Standing Expiration Date:   10/21/2022    Lipid Panel     Standing Status:   Future     Standing Expiration Date:   10/21/2022     Order Specific Question:   Is Patient Fasting?/# of Hours     Answer:   12 hours    TSH without Reflex     Standing Status:   Future     Standing Expiration Date:   10/21/2022    T4, Free     Standing Status:   Future     Standing Expiration Date:   10/21/2022     Patient is to try to add nutritional supplemental intake with increased shakes or protein powders added to her soft foods. Patient is to continue on the rest of her current medical therapy. No additional changes are made at this time.     Patient is to return to my office in 6 months duration or sooner if any further problems or symptoms arise. (Please note that portions of this note were completed with a voice recognition program. Efforts were made to edit the dictations but occasionally words are mis-transcribed.)      No follow-ups on file. An electronic signature was used to authenticate this note.     --Lori Toledo, DO on 10/21/2021 at 7:34 AM

## 2021-10-21 NOTE — PATIENT INSTRUCTIONS
Hospital Outpatient Visit on 10/19/2021   Component Date Value Ref Range Status    Thyroxine, Free 10/19/2021 1.68  0.93 - 1.70 ng/dL Final    TSH 10/19/2021 0.45  0.30 - 5.00 mIU/L Final    Cholesterol 10/19/2021 130  <200 mg/dL Final    Comment:    Cholesterol Guidelines:      <200  Desirable   200-240  Borderline      >240  Undesirable         HDL 10/19/2021 59  >40 mg/dL Final    Comment:    HDL Guidelines:    <40     Undesirable   40-59    Borderline    >59     Desirable         LDL Cholesterol 10/19/2021 60  0 - 130 mg/dL Final    Comment:    LDL Guidelines:     <100    Desirable   100-129   Near to/above Desirable   130-159   Borderline      >159   Undesirable     Direct (measured) LDL and calculated LDL are not interchangeable tests.  Chol/HDL Ratio 10/19/2021 2.2  <5 Final            Triglycerides 10/19/2021 56  <150 mg/dL Final    Comment:    Triglyceride Guidelines:     <150   Desirable   150-199  Borderline   200-499  High     >499   Very high   Based on AHA Guidelines for fasting triglyceride, October 2012.  VLDL 10/19/2021 NOT REPORTED  1 - 30 mg/dL Final    Hemoglobin A1C 10/19/2021 5.0  4.0 - 6.0 % Final    Estimated Avg Glucose 10/19/2021 97  mg/dL Final    Comment: The ADA and AACC recommend providing the estimated average glucose result to permit better   patient understanding of their HBA1c result.       Glucose 10/19/2021 97  70 - 99 mg/dL Final    BUN 10/19/2021 29* 8 - 23 mg/dL Final    CREATININE 10/19/2021 1.11* 0.50 - 0.90 mg/dL Final    Bun/Cre Ratio 10/19/2021 26* 9 - 20 Final    Calcium 10/19/2021 9.0  8.6 - 10.4 mg/dL Final    Sodium 10/19/2021 142  135 - 144 mmol/L Final    Potassium 10/19/2021 4.4  3.7 - 5.3 mmol/L Final    Chloride 10/19/2021 109* 98 - 107 mmol/L Final    CO2 10/19/2021 27  20 - 31 mmol/L Final    Anion Gap 10/19/2021 6* 9 - 17 mmol/L Final    Alkaline Phosphatase 10/19/2021 89  35 - 104 U/L Final    ALT 10/19/2021 19  5 - 33 U/L Final  AST 10/19/2021 34* <32 U/L Final    Total Bilirubin 10/19/2021 0.40  0.3 - 1.2 mg/dL Final    Total Protein 10/19/2021 6.9  6.4 - 8.3 g/dL Final    Albumin 10/19/2021 3.5  3.5 - 5.2 g/dL Final    Albumin/Globulin Ratio 10/19/2021 1.0  1.0 - 2.5 Final    GFR Non- 10/19/2021 47* >60 mL/min Final    GFR  10/19/2021 57* >60 mL/min Final    GFR Comment 10/19/2021        Final    Comment: Average GFR for 79or more years old:   76 mL/min/1.73sq m  Chronic Kidney Disease:   <60 mL/min/1.73sq m  Kidney failure:   <15 mL/min/1.73sq m              eGFR calculated using average adult body mass.  Additional eGFR calculator available at:        [email or web address]            GFR Staging 10/19/2021 NOT REPORTED   Final    WBC 10/19/2021 9.3  3.5 - 11.3 k/uL Final    RBC 10/19/2021 3.79* 3.95 - 5.11 m/uL Final    Hemoglobin 10/19/2021 11.5* 11.9 - 15.1 g/dL Final    Hematocrit 10/19/2021 36.7  36.3 - 47.1 % Final    MCV 10/19/2021 96.8  82.6 - 102.9 fL Final    MCH 10/19/2021 30.3  25.2 - 33.5 pg Final    MCHC 10/19/2021 31.3  25.2 - 33.5 g/dL Final    RDW 10/19/2021 13.2  11.8 - 14.4 % Final    Platelets 79/24/0201 270  138 - 453 k/uL Final    MPV 10/19/2021 8.9  8.1 - 13.5 fL Final    NRBC Automated 10/19/2021 0.0  0.0 per 100 WBC Final    Differential Type 10/19/2021 NOT REPORTED   Final    Seg Neutrophils 10/19/2021 68* 36 - 65 % Final    Lymphocytes 10/19/2021 20* 24 - 43 % Final    Monocytes 10/19/2021 6  3 - 12 % Final    Eosinophils % 10/19/2021 4  1 - 4 % Final    Basophils 10/19/2021 1  0 - 2 % Final    Immature Granulocytes 10/19/2021 1* 0 % Final    Segs Absolute 10/19/2021 6.38  1.50 - 8.10 k/uL Final    Absolute Lymph # 10/19/2021 1.88  1.10 - 3.70 k/uL Final    Absolute Mono # 10/19/2021 0.56  0.10 - 1.20 k/uL Final    Absolute Eos # 10/19/2021 0.39  0.00 - 0.44 k/uL Final    Basophils Absolute 10/19/2021 0.07  0.00 - 0.20 k/uL Final    Absolute Immature Granulocyte 10/19/2021 0.05  0.00 - 0.30 k/uL Final    WBC Morphology 10/19/2021 NOT REPORTED   Final    RBC Morphology 10/19/2021 NOT REPORTED   Final    Platelet Estimate 06/15/1852 NOT REPORTED   Final done

## 2021-11-01 ENCOUNTER — TELEPHONE (OUTPATIENT)
Dept: FAMILY MEDICINE CLINIC | Age: 86
End: 2021-11-01

## 2021-11-01 RX ORDER — OXYBUTYNIN CHLORIDE 10 MG/1
TABLET, EXTENDED RELEASE ORAL
Qty: 90 TABLET | Refills: 1 | Status: SHIPPED | OUTPATIENT
Start: 2021-11-01 | End: 2022-05-09

## 2021-11-01 NOTE — TELEPHONE ENCOUNTER
Script was sent to Dr Penelope Hernandez for refill. Script did not come through until later Friday night.

## 2021-11-01 NOTE — TELEPHONE ENCOUNTER
----- Message from Courtney Kan sent at 11/1/2021  7:49 AM EDT -----  Subject: Message to Provider    QUESTIONS  Information for Provider? Son called today to ask why the medication for   Oxybueynin 10 mg tablet, has not been filled she was seen on 10-   and the pharmacy still has no record of it being sent or called in. The   pharmacy is [address] [place] [address] Drive  ---------------------------------------------------------------------------  --------------  Terrie Tyler INFO  What is the best way for the office to contact you? OK to leave message on   voicemail  Preferred Call Back Phone Number? [phone]  ---------------------------------------------------------------------------  --------------  SCRIPT ANSWERS  Relationship to Patient? Sibling  Representative Name? Samson Nair  Is the Representative on the appropriate HIPAA document in Epic?  Yes

## 2021-11-01 NOTE — TELEPHONE ENCOUNTER
Krystin De Jesus called requesting a refill of the below medication which has been pended for you:     Requested Prescriptions     Pending Prescriptions Disp Refills    oxybutynin (DITROPAN-XL) 10 MG extended release tablet [Pharmacy Med Name: Oxybutynin Chloride ER 10 MG Oral Tablet Extended Release 24 Hour] 90 tablet 1     Sig: Take 1 tablet by mouth once daily       Last Appointment Date: 10/21/2021  Next Appointment Date: 4/21/2022    Allergies   Allergen Reactions    Other      She did have an IV antibiotic while she was in the hospital that she had a rash from, but son is unsure of the name of that. Otherwise, she has no known allergies.

## 2021-11-01 NOTE — TELEPHONE ENCOUNTER
Umm Azul calling to complain that now he has to walk to [facility] to  this script as it was very unprofessional of the nurse that roomed them at visit did not take care of this then, Umm Azul very unhappy with situation.

## 2021-11-02 RX ORDER — LEVOTHYROXINE SODIUM 0.07 MG/1
TABLET ORAL
Qty: 90 TABLET | Refills: 1 | Status: SHIPPED | OUTPATIENT
Start: 2021-11-02 | End: 2022-05-09 | Stop reason: SDUPTHER

## 2021-11-02 RX ORDER — METOPROLOL TARTRATE 50 MG/1
TABLET, FILM COATED ORAL
Qty: 180 TABLET | Refills: 1 | Status: SHIPPED | OUTPATIENT
Start: 2021-11-02 | End: 2022-05-09 | Stop reason: SDUPTHER

## 2021-11-02 RX ORDER — LOVASTATIN 20 MG/1
TABLET ORAL
Qty: 180 TABLET | Refills: 1 | Status: SHIPPED | OUTPATIENT
Start: 2021-11-02 | End: 2022-05-09 | Stop reason: SDUPTHER

## 2021-11-02 RX ORDER — LISINOPRIL 2.5 MG/1
TABLET ORAL
Qty: 90 TABLET | Refills: 1 | Status: SHIPPED | OUTPATIENT
Start: 2021-11-02 | End: 2022-05-09 | Stop reason: SDUPTHER

## 2021-11-02 RX ORDER — CLOPIDOGREL BISULFATE 75 MG/1
TABLET ORAL
Qty: 90 TABLET | Refills: 1 | Status: SHIPPED | OUTPATIENT
Start: 2021-11-02 | End: 2022-05-09 | Stop reason: SDUPTHER

## 2021-11-02 NOTE — TELEPHONE ENCOUNTER
Sam Goltz called requesting a refill of the below medication which has been pended for you:     Requested Prescriptions     Pending Prescriptions Disp Refills    lisinopril (PRINIVIL;ZESTRIL) 2.5 MG tablet [Pharmacy Med Name: Lisinopril 2.5 MG Oral Tablet] 90 tablet 0     Sig: Take 1 tablet by mouth once daily    lovastatin (MEVACOR) 20 MG tablet [Pharmacy Med Name: Lovastatin 20 MG Oral Tablet] 180 tablet 0     Sig: TAKE 2 TABLETS BY MOUTH NIGHTLY    clopidogrel (PLAVIX) 75 MG tablet [Pharmacy Med Name: Clopidogrel Bisulfate 75 MG Oral Tablet] 90 tablet 0     Sig: Take 1 tablet by mouth once daily    levothyroxine (SYNTHROID) 75 MCG tablet [Pharmacy Med Name: Levothyroxine Sodium 75 MCG Oral Tablet] 90 tablet 0     Sig: Take 1 tablet by mouth once daily    metFORMIN (GLUCOPHAGE) 500 MG tablet [Pharmacy Med Name: metFORMIN HCl 500 MG Oral Tablet] 90 tablet 0     Sig: Take 1 tablet by mouth once daily with breakfast    metoprolol tartrate (LOPRESSOR) 50 MG tablet [Pharmacy Med Name: Metoprolol Tartrate 50 MG Oral Tablet] 180 tablet 0     Sig: Take 1 tablet by mouth twice daily       Last Appointment Date: 10/21/2021  Next Appointment Date: 4/21/2022    Allergies   Allergen Reactions    Other      She did have an IV antibiotic while she was in the hospital that she had a rash from, but son is unsure of the name of that. Otherwise, she has no known allergies.

## 2021-12-06 RX ORDER — METOPROLOL TARTRATE 50 MG/1
TABLET, FILM COATED ORAL
Qty: 180 TABLET | Refills: 0 | OUTPATIENT
Start: 2021-12-06

## 2021-12-06 NOTE — TELEPHONE ENCOUNTER
Shamika Jerez called requesting a refill of the below medication which has been pended for you: this was done 11/02/2021    Requested Prescriptions     Refused Prescriptions Disp Refills    metoprolol tartrate (LOPRESSOR) 50 MG tablet [Pharmacy Med Name: Metoprolol Tartrate 50 MG Oral Tablet] 180 tablet 0     Sig: Take 1 tablet by mouth twice daily       Last Appointment Date: 10/21/2021  Next Appointment Date: 4/21/2022    Allergies   Allergen Reactions    Other      She did have an IV antibiotic while she was in the hospital that she had a rash from, but son is unsure of the name of that. Otherwise, she has no known allergies.

## 2022-04-20 ENCOUNTER — HOSPITAL ENCOUNTER (OUTPATIENT)
Dept: LAB | Age: 87
Discharge: HOME | End: 2022-04-20
Payer: MEDICARE

## 2022-04-20 ENCOUNTER — IMMUNIZATION (OUTPATIENT)
Dept: LAB | Age: 87
End: 2022-04-20
Payer: MEDICARE

## 2022-04-20 DIAGNOSIS — I10 ESSENTIAL HYPERTENSION: ICD-10-CM

## 2022-04-20 DIAGNOSIS — E03.9 ACQUIRED HYPOTHYROIDISM: ICD-10-CM

## 2022-04-20 DIAGNOSIS — R73.01 IMPAIRED FASTING GLUCOSE: ICD-10-CM

## 2022-04-20 DIAGNOSIS — E78.2 MIXED HYPERLIPIDEMIA: ICD-10-CM

## 2022-04-20 LAB
ABSOLUTE EOS #: 0.31 K/UL (ref 0–0.44)
ABSOLUTE IMMATURE GRANULOCYTE: 0.05 K/UL (ref 0–0.3)
ABSOLUTE LYMPH #: 1.1 K/UL (ref 1.1–3.7)
ABSOLUTE MONO #: 0.68 K/UL (ref 0.1–1.2)
ALBUMIN SERPL-MCNC: 3.7 G/DL (ref 3.5–5.2)
ALBUMIN/GLOBULIN RATIO: 1 (ref 1–2.5)
ALP BLD-CCNC: 85 U/L (ref 35–104)
ALT SERPL-CCNC: 24 U/L (ref 5–33)
ANION GAP SERPL CALCULATED.3IONS-SCNC: 7 MMOL/L (ref 9–17)
AST SERPL-CCNC: 45 U/L
BASOPHILS # BLD: 1 % (ref 0–2)
BASOPHILS ABSOLUTE: 0.06 K/UL (ref 0–0.2)
BILIRUB SERPL-MCNC: 0.3 MG/DL (ref 0.3–1.2)
BUN BLDV-MCNC: 25 MG/DL (ref 8–23)
BUN/CREAT BLD: 22 (ref 9–20)
CALCIUM SERPL-MCNC: 9.5 MG/DL (ref 8.6–10.4)
CHLORIDE BLD-SCNC: 109 MMOL/L (ref 98–107)
CHOLESTEROL/HDL RATIO: 2.1
CHOLESTEROL: 157 MG/DL
CO2: 29 MMOL/L (ref 20–31)
CREAT SERPL-MCNC: 1.16 MG/DL (ref 0.5–0.9)
EOSINOPHILS RELATIVE PERCENT: 3 % (ref 1–4)
ESTIMATED AVERAGE GLUCOSE: 108 MG/DL
GFR AFRICAN AMERICAN: 54 ML/MIN
GFR NON-AFRICAN AMERICAN: 44 ML/MIN
GFR SERPL CREATININE-BSD FRML MDRD: ABNORMAL ML/MIN/{1.73_M2}
GLUCOSE BLD-MCNC: 92 MG/DL (ref 70–99)
HBA1C MFR BLD: 5.4 % (ref 4–6)
HCT VFR BLD CALC: 36.4 % (ref 36.3–47.1)
HDLC SERPL-MCNC: 75 MG/DL
HEMOGLOBIN: 10.9 G/DL (ref 11.9–15.1)
IMMATURE GRANULOCYTES: 1 %
LDL CHOLESTEROL: 69 MG/DL (ref 0–130)
LYMPHOCYTES # BLD: 12 % (ref 24–43)
MCH RBC QN AUTO: 25.4 PG (ref 25.2–33.5)
MCHC RBC AUTO-ENTMCNC: 29.9 G/DL (ref 25.2–33.5)
MCV RBC AUTO: 84.8 FL (ref 82.6–102.9)
MONOCYTES # BLD: 7 % (ref 3–12)
NRBC AUTOMATED: 0 PER 100 WBC
PDW BLD-RTO: 14.6 % (ref 11.8–14.4)
PLATELET # BLD: 274 K/UL (ref 138–453)
PMV BLD AUTO: 9.4 FL (ref 8.1–13.5)
POTASSIUM SERPL-SCNC: 5.4 MMOL/L (ref 3.7–5.3)
RBC # BLD: 4.29 M/UL (ref 3.95–5.11)
RBC # BLD: ABNORMAL 10*6/UL
SEG NEUTROPHILS: 76 % (ref 36–65)
SEGMENTED NEUTROPHILS ABSOLUTE COUNT: 7.26 K/UL (ref 1.5–8.1)
SODIUM BLD-SCNC: 145 MMOL/L (ref 135–144)
THYROXINE, FREE: 1.56 NG/DL (ref 0.93–1.7)
TOTAL PROTEIN: 7.4 G/DL (ref 6.4–8.3)
TRIGL SERPL-MCNC: 63 MG/DL
TSH SERPL DL<=0.05 MIU/L-ACNC: 2.38 UIU/ML (ref 0.3–5)
WBC # BLD: 9.5 K/UL (ref 3.5–11.3)

## 2022-04-20 PROCEDURE — 83036 HEMOGLOBIN GLYCOSYLATED A1C: CPT

## 2022-04-20 PROCEDURE — 85025 COMPLETE CBC W/AUTO DIFF WBC: CPT

## 2022-04-20 PROCEDURE — 91306 COVID-19, MODERNA BOOSTER VACCINE 0.25ML DOSE: CPT | Performed by: INTERNAL MEDICINE

## 2022-04-20 PROCEDURE — PBSHW COVID-19, MODERNA BOOSTER VACCINE 0.25ML DOSE: Performed by: INTERNAL MEDICINE

## 2022-04-20 PROCEDURE — 80061 LIPID PANEL: CPT

## 2022-04-20 PROCEDURE — 84443 ASSAY THYROID STIM HORMONE: CPT

## 2022-04-20 PROCEDURE — 80053 COMPREHEN METABOLIC PANEL: CPT

## 2022-04-20 PROCEDURE — 36415 COLL VENOUS BLD VENIPUNCTURE: CPT

## 2022-04-20 PROCEDURE — 84439 ASSAY OF FREE THYROXINE: CPT

## 2022-05-09 ENCOUNTER — OFFICE VISIT (OUTPATIENT)
Dept: FAMILY MEDICINE CLINIC | Age: 87
End: 2022-05-09
Payer: MEDICARE

## 2022-05-09 VITALS
OXYGEN SATURATION: 98 % | SYSTOLIC BLOOD PRESSURE: 110 MMHG | BODY MASS INDEX: 19.52 KG/M2 | RESPIRATION RATE: 16 BRPM | TEMPERATURE: 97.5 F | HEART RATE: 48 BPM | HEIGHT: 59 IN | WEIGHT: 96.8 LBS | DIASTOLIC BLOOD PRESSURE: 60 MMHG

## 2022-05-09 DIAGNOSIS — E78.2 MIXED HYPERLIPIDEMIA: ICD-10-CM

## 2022-05-09 DIAGNOSIS — I10 ESSENTIAL HYPERTENSION: ICD-10-CM

## 2022-05-09 DIAGNOSIS — Z00.00 MEDICARE ANNUAL WELLNESS VISIT, SUBSEQUENT: ICD-10-CM

## 2022-05-09 DIAGNOSIS — Z01.00 ENCOUNTER FOR COMPLETE EYE EXAM: ICD-10-CM

## 2022-05-09 DIAGNOSIS — I73.9 PERIPHERAL VASCULAR DISEASE (HCC): ICD-10-CM

## 2022-05-09 DIAGNOSIS — D50.9 IRON DEFICIENCY ANEMIA, UNSPECIFIED IRON DEFICIENCY ANEMIA TYPE: ICD-10-CM

## 2022-05-09 DIAGNOSIS — R73.01 IMPAIRED FASTING GLUCOSE: ICD-10-CM

## 2022-05-09 DIAGNOSIS — E03.9 ACQUIRED HYPOTHYROIDISM: ICD-10-CM

## 2022-05-09 DIAGNOSIS — N18.32 STAGE 3B CHRONIC KIDNEY DISEASE (HCC): ICD-10-CM

## 2022-05-09 DIAGNOSIS — R13.13 PHARYNGEAL DYSPHAGIA: Primary | ICD-10-CM

## 2022-05-09 PROCEDURE — 4040F PNEUMOC VAC/ADMIN/RCVD: CPT | Performed by: FAMILY MEDICINE

## 2022-05-09 PROCEDURE — 99212 OFFICE O/P EST SF 10 MIN: CPT | Performed by: FAMILY MEDICINE

## 2022-05-09 PROCEDURE — G8420 CALC BMI NORM PARAMETERS: HCPCS | Performed by: FAMILY MEDICINE

## 2022-05-09 PROCEDURE — G0439 PPPS, SUBSEQ VISIT: HCPCS | Performed by: FAMILY MEDICINE

## 2022-05-09 PROCEDURE — 1090F PRES/ABSN URINE INCON ASSESS: CPT | Performed by: FAMILY MEDICINE

## 2022-05-09 PROCEDURE — G8427 DOCREV CUR MEDS BY ELIG CLIN: HCPCS | Performed by: FAMILY MEDICINE

## 2022-05-09 PROCEDURE — 1123F ACP DISCUSS/DSCN MKR DOCD: CPT | Performed by: FAMILY MEDICINE

## 2022-05-09 PROCEDURE — 99213 OFFICE O/P EST LOW 20 MIN: CPT | Performed by: FAMILY MEDICINE

## 2022-05-09 PROCEDURE — G8400 PT W/DXA NO RESULTS DOC: HCPCS | Performed by: FAMILY MEDICINE

## 2022-05-09 PROCEDURE — 1036F TOBACCO NON-USER: CPT | Performed by: FAMILY MEDICINE

## 2022-05-09 RX ORDER — LOVASTATIN 20 MG/1
TABLET ORAL
Qty: 180 TABLET | Refills: 1 | Status: SHIPPED | OUTPATIENT
Start: 2022-05-09

## 2022-05-09 RX ORDER — OXYBUTYNIN CHLORIDE 10 MG/1
TABLET, EXTENDED RELEASE ORAL
Qty: 90 TABLET | Refills: 1 | Status: SHIPPED | OUTPATIENT
Start: 2022-05-09

## 2022-05-09 RX ORDER — CLOPIDOGREL BISULFATE 75 MG/1
TABLET ORAL
Qty: 90 TABLET | Refills: 1 | Status: SHIPPED | OUTPATIENT
Start: 2022-05-09

## 2022-05-09 RX ORDER — LISINOPRIL 2.5 MG/1
TABLET ORAL
Qty: 90 TABLET | Refills: 1 | Status: SHIPPED | OUTPATIENT
Start: 2022-05-09

## 2022-05-09 RX ORDER — LEVOTHYROXINE SODIUM 0.07 MG/1
TABLET ORAL
Qty: 90 TABLET | Refills: 1 | Status: SHIPPED | OUTPATIENT
Start: 2022-05-09

## 2022-05-09 RX ORDER — METOPROLOL TARTRATE 50 MG/1
TABLET, FILM COATED ORAL
Qty: 180 TABLET | Refills: 1 | Status: SHIPPED | OUTPATIENT
Start: 2022-05-09

## 2022-05-09 SDOH — ECONOMIC STABILITY: FOOD INSECURITY: WITHIN THE PAST 12 MONTHS, THE FOOD YOU BOUGHT JUST DIDN'T LAST AND YOU DIDN'T HAVE MONEY TO GET MORE.: NEVER TRUE

## 2022-05-09 SDOH — ECONOMIC STABILITY: FOOD INSECURITY: WITHIN THE PAST 12 MONTHS, YOU WORRIED THAT YOUR FOOD WOULD RUN OUT BEFORE YOU GOT MONEY TO BUY MORE.: NEVER TRUE

## 2022-05-09 ASSESSMENT — ENCOUNTER SYMPTOMS
COUGH: 0
EYE REDNESS: 0
VOMITING: 0
SHORTNESS OF BREATH: 0
ABDOMINAL PAIN: 0
WHEEZING: 0
NAUSEA: 0
TROUBLE SWALLOWING: 0
DIARRHEA: 0
RHINORRHEA: 0
SINUS PRESSURE: 0
EYE DISCHARGE: 0
CONSTIPATION: 0
SORE THROAT: 0

## 2022-05-09 ASSESSMENT — PATIENT HEALTH QUESTIONNAIRE - PHQ9
SUM OF ALL RESPONSES TO PHQ QUESTIONS 1-9: 2
SUM OF ALL RESPONSES TO PHQ9 QUESTIONS 1 & 2: 2
1. LITTLE INTEREST OR PLEASURE IN DOING THINGS: 0
SUM OF ALL RESPONSES TO PHQ QUESTIONS 1-9: 2
SUM OF ALL RESPONSES TO PHQ QUESTIONS 1-9: 2
2. FEELING DOWN, DEPRESSED OR HOPELESS: 2
SUM OF ALL RESPONSES TO PHQ QUESTIONS 1-9: 2

## 2022-05-09 ASSESSMENT — SOCIAL DETERMINANTS OF HEALTH (SDOH): HOW HARD IS IT FOR YOU TO PAY FOR THE VERY BASICS LIKE FOOD, HOUSING, MEDICAL CARE, AND HEATING?: NOT HARD AT ALL

## 2022-05-09 ASSESSMENT — LIFESTYLE VARIABLES: HOW OFTEN DO YOU HAVE A DRINK CONTAINING ALCOHOL: NEVER

## 2022-05-09 NOTE — PROGRESS NOTES
2022     Regina Saunders (:  1935) is a 82 y.o. female, here for evaluation of the following medical concerns:    HPI  Patient comes in today for follow up of chronic health issues Patient does come in today with her son. Still having issues with swallowing. He states that it seems to be getting worse. He does note that some food she can need another food she struggles with. Last speech therapy was in 2019 and it has gotten worse since that time. She has a known Zenker's diverticulum and this has contributed difficulty with swallowing. He wondered about changing her meal plan to a puréed diet as she has most issues with the meat in the [facility] meals. We can switch to a puréed diet to see if she would taken more meet. I did make note that her hemoglobin has dropped and this certainly is a concern. She has not had any change in her bowels. Son does give her a multivitamin that does have iron in it but I did advise him that he should get an iron supplement over-the-counter if she does not eat a very high iron rich diet. He states that it is very random as far as the types of food that she can tolerate. He does give her some boost supplements. We did talk about pursuing EGD and colonoscopy due to her dysphagia and worsening anemia and patient had refused at this time after discussion. Will need to continue to monitor her but I did recommend that they had iron supplemental therapy over-the-counter. Does have a known history of hypertension and her blood pressure is stable and controlled on her current medical therapy. Has known peripheral vascular disease without acute issue at this time. Has known hyperlipidemia and her cholesterol levels were stable and controlled on her current statin dose. Does have known hypothyroidism and her thyroid levels are stable and adequately supplemented on her current thyroid dose.   Does have a known history of impaired fasting glucose and her blood sugar level remains stable and controlled with her current dietary intake and current metformin dose. Has known chronic kidney disease stage IIIb and her creatinine is stable at this time. Patient otherwise has no other acute medical concerns.   Patient's recent lab reports are as follows:    Results for orders placed or performed during the hospital encounter of 04/20/22   T4, Free   Result Value Ref Range    Thyroxine, Free 1.56 0.93 - 1.70 ng/dL   TSH without Reflex   Result Value Ref Range    TSH 2.38 0.30 - 5.00 uIU/mL   Lipid Panel   Result Value Ref Range    Cholesterol 157 <200 mg/dL    HDL 75 >40 mg/dL    LDL Cholesterol 69 0 - 130 mg/dL    Chol/HDL Ratio 2.1 <5    Triglycerides 63 <150 mg/dL   Hemoglobin A1C   Result Value Ref Range    Hemoglobin A1C 5.4 4.0 - 6.0 %    Estimated Avg Glucose 108 mg/dL   Comprehensive Metabolic Panel   Result Value Ref Range    Glucose 92 70 - 99 mg/dL    BUN 25 (H) 8 - 23 mg/dL    CREATININE 1.16 (H) 0.50 - 0.90 mg/dL    Bun/Cre Ratio 22 (H) 9 - 20    Calcium 9.5 8.6 - 10.4 mg/dL    Sodium 145 (H) 135 - 144 mmol/L    Potassium 5.4 (H) 3.7 - 5.3 mmol/L    Chloride 109 (H) 98 - 107 mmol/L    CO2 29 20 - 31 mmol/L    Anion Gap 7 (L) 9 - 17 mmol/L    Alkaline Phosphatase 85 35 - 104 U/L    ALT 24 5 - 33 U/L    AST 45 (H) <32 U/L    Total Bilirubin 0.30 0.3 - 1.2 mg/dL    Total Protein 7.4 6.4 - 8.3 g/dL    Albumin 3.7 3.5 - 5.2 g/dL    Albumin/Globulin Ratio 1.0 1.0 - 2.5    GFR Non- 44 (L) >60 mL/min    GFR  54 (L) >60 mL/min    GFR Comment         CBC Auto Differential   Result Value Ref Range    WBC 9.5 3.5 - 11.3 k/uL    RBC 4.29 3.95 - 5.11 m/uL    Hemoglobin 10.9 (L) 11.9 - 15.1 g/dL    Hematocrit 36.4 36.3 - 47.1 %    MCV 84.8 82.6 - 102.9 fL    MCH 25.4 25.2 - 33.5 pg    MCHC 29.9 25.2 - 33.5 g/dL    RDW 14.6 (H) 11.8 - 14.4 %    Platelets 546 278 - 655 k/uL    MPV 9.4 8.1 - 13.5 fL    NRBC Automated 0.0 0.0 per 100 WBC    Seg Neutrophils 76 (H) 36 - 65 %    Lymphocytes 12 (L) 24 - 43 %    Monocytes 7 3 - 12 %    Eosinophils % 3 1 - 4 %    Basophils 1 0 - 2 %    Immature Granulocytes 1 (H) 0 %    Segs Absolute 7.26 1.50 - 8.10 k/uL    Absolute Lymph # 1.10 1.10 - 3.70 k/uL    Absolute Mono # 0.68 0.10 - 1.20 k/uL    Absolute Eos # 0.31 0.00 - 0.44 k/uL    Basophils Absolute 0.06 0.00 - 0.20 k/uL    Absolute Immature Granulocyte 0.05 0.00 - 0.30 k/uL    RBC Morphology ANISOCYTOSIS PRESENT       Other review of systems are as noted below. Preventative measures are reviewed today. See health maintenance section for complete preventative plan of care. Did review patient's med list, allergies, social history, fam history, pmhx and pshx today as noted in the record. Review of Systems   Constitutional: Negative for chills, fatigue and fever. HENT: Negative for congestion, ear pain, postnasal drip, rhinorrhea, sinus pressure, sore throat and trouble swallowing. Eyes: Negative for discharge and redness. Respiratory: Negative for cough, shortness of breath and wheezing. Cardiovascular: Negative for chest pain. Gastrointestinal: Negative for abdominal pain, constipation, diarrhea, nausea and vomiting. Genitourinary: Negative for dysuria, flank pain, frequency and urgency. Musculoskeletal: Negative for arthralgias, myalgias and neck pain. Skin: Negative for rash and wound. Allergic/Immunologic: Negative for environmental allergies. Neurological: Negative for dizziness, weakness, light-headedness and headaches. Hematological: Negative for adenopathy. Psychiatric/Behavioral: Negative. Prior to Visit Medications    Medication Sig Taking?  Authorizing Provider   oxybutynin (DITROPAN-XL) 10 MG extended release tablet Take 1 tablet by mouth once daily Yes Orvilla Ivanoff, DO   metoprolol tartrate (LOPRESSOR) 50 MG tablet Take 1 tablet by mouth twice daily Yes Orvilla Ivanoff, DO   metFORMIN (GLUCOPHAGE) 500 MG tablet Take 1 tablet by mouth once daily with breakfast Yes Azam Quiñonez DO   levothyroxine (SYNTHROID) 75 MCG tablet Take 1 tablet by mouth once daily Yes Azam Quiñonez DO   clopidogrel (PLAVIX) 75 MG tablet Take 1 tablet by mouth once daily Yes Azam Quiñonez DO   lovastatin (MEVACOR) 20 MG tablet TAKE 2 TABLETS BY MOUTH NIGHTLY Yes Silvia Teixeira,    lisinopril (PRINIVIL;ZESTRIL) 2.5 MG tablet Take 1 tablet by mouth once daily Yes Azam Quiñonez DO   NONFORMULARY Relaxing leg cream uses prn active ingred: gnaphalium polycephalum 6X HPUS (pain reliever) and causticum 8X HPUS (nighttime sleep aid) Yes Historical Provider, MD   Calcium Carbonate-Vitamin D (CALCIUM 600 + D PO) Take 1 tablet by mouth 2 times daily. Yes Historical Provider, MD   aspirin 325 MG EC tablet Take 325 mg by mouth daily. Yes Historical Provider, MD   Multiple Vitamins-Minerals (MULTIVITAMIN PO) Take 1 tablet by mouth daily. Multivitamin with iron Yes Historical Provider, MD        Social History     Tobacco Use    Smoking status: Former Smoker     Packs/day: 1.00     Years: 50.00     Pack years: 50.00     Types: Cigarettes    Smokeless tobacco: Never Used    Tobacco comment: Smoked 1 pack a day for 50 years' duration but quit some years ago. Substance Use Topics    Alcohol use: No        Vitals:    05/09/22 1000   BP: 110/60   Site: Left Upper Arm   Position: Sitting   Cuff Size: Medium Adult   Pulse: (!) 48   Resp: 16   Temp: 97.5 °F (36.4 °C)   TempSrc: Tympanic   SpO2: 98%   Weight: 96 lb 12.8 oz (43.9 kg)   Height: 4' 11\" (1.499 m)     Estimated body mass index is 19.55 kg/m² as calculated from the following:    Height as of this encounter: 4' 11\" (1.499 m). Weight as of this encounter: 96 lb 12.8 oz (43.9 kg). Physical Exam  Vitals and nursing note reviewed. Constitutional:       General: She is not in acute distress. Appearance: Normal appearance. She is well-developed.  She is not diaphoretic. HENT:      Head: Normocephalic and atraumatic. Right Ear: Tympanic membrane, ear canal and external ear normal.      Left Ear: Tympanic membrane, ear canal and external ear normal.      Nose: Nose normal.      Mouth/Throat:      Mouth: Mucous membranes are moist.      Pharynx: Oropharynx is clear. No oropharyngeal exudate. Eyes:      General:         Right eye: No discharge. Left eye: No discharge. Conjunctiva/sclera: Conjunctivae normal.      Pupils: Pupils are equal, round, and reactive to light. Neck:      Thyroid: No thyromegaly. Cardiovascular:      Rate and Rhythm: Normal rate and regular rhythm. Heart sounds: Normal heart sounds. Pulmonary:      Effort: Pulmonary effort is normal.      Breath sounds: Normal breath sounds. No wheezing or rales. Abdominal:      General: Bowel sounds are normal. There is no distension. Palpations: Abdomen is soft. Tenderness: There is no abdominal tenderness. Musculoskeletal:      Cervical back: Normal range of motion and neck supple. Lymphadenopathy:      Cervical: No cervical adenopathy. Skin:     General: Skin is warm and dry. Findings: No rash. Neurological:      Mental Status: She is alert and oriented to person, place, and time. Psychiatric:         Behavior: Behavior normal.         Thought Content: Thought content normal.         Judgment: Judgment normal.           ASSESSMENT/PLAN:  Encounter Diagnoses   Name Primary?     Pharyngeal dysphagia Yes    Iron deficiency anemia, unspecified iron deficiency anemia type     Essential hypertension     Peripheral vascular disease (HCC)     Mixed hyperlipidemia     Acquired hypothyroidism     Impaired fasting glucose     Stage 3b chronic kidney disease ([facility] [address]     Medicare annual wellness visit, subsequent     Encounter for complete eye exam      Orders Placed This Encounter   Medications    metoprolol tartrate (LOPRESSOR) 50 MG tablet     Sig: Take 1 tablet by mouth twice daily     Dispense:  180 tablet     Refill:  1    metFORMIN (GLUCOPHAGE) 500 MG tablet     Sig: Take 1 tablet by mouth once daily with breakfast     Dispense:  90 tablet     Refill:  1    levothyroxine (SYNTHROID) 75 MCG tablet     Sig: Take 1 tablet by mouth once daily     Dispense:  90 tablet     Refill:  1    clopidogrel (PLAVIX) 75 MG tablet     Sig: Take 1 tablet by mouth once daily     Dispense:  90 tablet     Refill:  1    lovastatin (MEVACOR) 20 MG tablet     Sig: TAKE 2 TABLETS BY MOUTH NIGHTLY     Dispense:  180 tablet     Refill:  1    lisinopril (PRINIVIL;ZESTRIL) 2.5 MG tablet     Sig: Take 1 tablet by mouth once daily     Dispense:  90 tablet     Refill:  1     Orders Placed This Encounter   Procedures    CBC with Auto Differential     Standing Status:   Future     Standing Expiration Date:   5/9/2023    Comprehensive Metabolic Panel     Standing Status:   Future     Standing Expiration Date:   5/9/2023    Hemoglobin A1C     Standing Status:   Future     Standing Expiration Date:   5/9/2023    Lipid Panel     Standing Status:   Future     Standing Expiration Date:   5/9/2023     Order Specific Question:   Is Patient Fasting?/# of Hours     Answer:   12 hours    TSH     Standing Status:   Future     Standing Expiration Date:   5/9/2023    T4, Free     Standing Status:   Future     Standing Expiration Date:   5/9/2023   [facility] - Bhavani Peacock OD, Optometry, [facility]     Referral Priority:   Routine     Referral Type:   Eval and Treat     Referral Reason:   Specialty Services Required     Referred to Provider:   Mariel Pollock OD     Requested Specialty:   Optometry     Number of Visits Requested:   1     Will contact the [facility] and change her food to puréed diet. She is struggling to eat the meat that comes in the current meals. Is to continue with boost or an sure supplemental shakes to get calorie intake.   Can refer for EGD and colonoscopy but patient had declined at this time. Will need to add iron supplemental therapy to her regimen to help with anemia. Would recommend 1 325 mg tab daily. Patient is to continue on the rest of her current medical therapy. No additional changes are made at this time. Patient is to continue to follow a low carb/low sugar diet and increased activity is able for optimal blood sugar control. Patient is to return to my office in 6 months duration or sooner if any further problems or symptoms arise. (Please note that portions of this note were completed with a voice recognition program. Efforts were made to edit the dictations but occasionally words are mis-transcribed.)        Return in about 6 months (around 11/9/2022). An electronic signature was used to authenticate this note.     --Roz Canales, DO on 5/9/2022 at 10:53 AM

## 2022-05-09 NOTE — PATIENT INSTRUCTIONS
Hospital Outpatient Visit on 04/20/2022   Component Date Value Ref Range Status    Thyroxine, Free 04/20/2022 1.56  0.93 - 1.70 ng/dL Final    TSH 04/20/2022 2.38  0.30 - 5.00 uIU/mL Final    Cholesterol 04/20/2022 157  <200 mg/dL Final    Comment:    Cholesterol Guidelines:      <200  Desirable   200-240  Borderline      >240  Undesirable         HDL 04/20/2022 75  >40 mg/dL Final    Comment:    HDL Guidelines:    <40     Undesirable   40-59    Borderline    >59     Desirable         LDL Cholesterol 04/20/2022 69  0 - 130 mg/dL Final    Comment:    LDL Guidelines:     <100    Desirable   100-129   Near to/above Desirable   130-159   Borderline      >159   Undesirable     Direct (measured) LDL and calculated LDL are not interchangeable tests.  Chol/HDL Ratio 04/20/2022 2.1  <5 Final            Triglycerides 04/20/2022 63  <150 mg/dL Final    Comment:    Triglyceride Guidelines:     <150   Desirable   150-199  Borderline   200-499  High     >499   Very high   Based on AHA Guidelines for fasting triglyceride, October 2012.  Hemoglobin A1C 04/20/2022 5.4  4.0 - 6.0 % Final    Estimated Avg Glucose 04/20/2022 108  mg/dL Final    Comment: The ADA and AACC recommend providing the estimated average glucose result to permit better   patient understanding of their HBA1c result.       Glucose 04/20/2022 92  70 - 99 mg/dL Final    BUN 04/20/2022 25* 8 - 23 mg/dL Final    CREATININE 04/20/2022 1.16* 0.50 - 0.90 mg/dL Final    Bun/Cre Ratio 04/20/2022 22* 9 - 20 Final    Calcium 04/20/2022 9.5  8.6 - 10.4 mg/dL Final    Sodium 04/20/2022 145* 135 - 144 mmol/L Final    Potassium 04/20/2022 5.4* 3.7 - 5.3 mmol/L Final    Chloride 04/20/2022 109* 98 - 107 mmol/L Final    CO2 04/20/2022 29  20 - 31 mmol/L Final    Anion Gap 04/20/2022 7* 9 - 17 mmol/L Final    Alkaline Phosphatase 04/20/2022 85  35 - 104 U/L Final    ALT 04/20/2022 24  5 - 33 U/L Final    AST 04/20/2022 45* <32 U/L Final    Total Bilirubin 04/20/2022 0.30  0.3 - 1.2 mg/dL Final    Total Protein 04/20/2022 7.4  6.4 - 8.3 g/dL Final    Albumin 04/20/2022 3.7  3.5 - 5.2 g/dL Final    Albumin/Globulin Ratio 04/20/2022 1.0  1.0 - 2.5 Final    GFR Non- 04/20/2022 44* >60 mL/min Final    GFR  04/20/2022 54* >60 mL/min Final    GFR Comment 04/20/2022        Final    Comment: Average GFR for 79or more years old:   76 mL/min/1.73sq m  Chronic Kidney Disease:   <60 mL/min/1.73sq m  Kidney failure:   <15 mL/min/1.73sq m              eGFR calculated using average adult body mass.  Additional eGFR calculator available at:        [email or web address]            WBC 04/20/2022 9.5  3.5 - 11.3 k/uL Final    RBC 04/20/2022 4.29  3.95 - 5.11 m/uL Final    Hemoglobin 04/20/2022 10.9* 11.9 - 15.1 g/dL Final    Hematocrit 04/20/2022 36.4  36.3 - 47.1 % Final    MCV 04/20/2022 84.8  82.6 - 102.9 fL Final    MCH 04/20/2022 25.4  25.2 - 33.5 pg Final    MCHC 04/20/2022 29.9  25.2 - 33.5 g/dL Final    RDW 04/20/2022 14.6* 11.8 - 14.4 % Final    Platelets 48/45/7799 274  138 - 453 k/uL Final    MPV 04/20/2022 9.4  8.1 - 13.5 fL Final    NRBC Automated 04/20/2022 0.0  0.0 per 100 WBC Final    Seg Neutrophils 04/20/2022 76* 36 - 65 % Final    Lymphocytes 04/20/2022 12* 24 - 43 % Final    Monocytes 04/20/2022 7  3 - 12 % Final    Eosinophils % 04/20/2022 3  1 - 4 % Final    Basophils 04/20/2022 1  0 - 2 % Final    Immature Granulocytes 04/20/2022 1* 0 % Final    Segs Absolute 04/20/2022 7.26  1.50 - 8.10 k/uL Final    Absolute Lymph # 04/20/2022 1.10  1.10 - 3.70 k/uL Final    Absolute Mono # 04/20/2022 0.68  0.10 - 1.20 k/uL Final    Absolute Eos # 04/20/2022 0.31  0.00 - 0.44 k/uL Final    Basophils Absolute 04/20/2022 0.06  0.00 - 0.20 k/uL Final    Absolute Immature Granulocyte 04/20/2022 0.05  0.00 - 0.30 k/uL Final    RBC Morphology 04/20/2022 ANISOCYTOSIS PRESENT   Final Personalized Preventive Plan for Aurelio Burch - 5/9/2022  Medicare offers a range of preventive health benefits. Some of the tests and screenings are paid in full while other may be subject to a deductible, co-insurance, and/or copay. Some of these benefits include a comprehensive review of your medical history including lifestyle, illnesses that may run in your family, and various assessments and screenings as appropriate. After reviewing your medical record and screening and assessments performed today your provider may have ordered immunizations, labs, imaging, and/or referrals for you. A list of these orders (if applicable) as well as your Preventive Care list are included within your After Visit Summary for your review. Other Preventive Recommendations:    · A preventive eye exam performed by an eye specialist is recommended every 1-2 years to screen for glaucoma; cataracts, macular degeneration, and other eye disorders. · A preventive dental visit is recommended every 6 months. · Try to get at least 150 minutes of exercise per week or 10,000 steps per day on a pedometer . · Order or download the FREE \"Exercise & Physical Activity: Your Everyday Guide\" from [employer]. Call 5-797.132.3569 or search [employer] online. · You need 5285-2733 mg of calcium and 7249-4180 IU of vitamin D per day. It is possible to meet your calcium requirement with diet alone, but a vitamin D supplement is usually necessary to meet this goal.  · When exposed to the sun, use a sunscreen that protects against both UVA and UVB radiation with an SPF of 30 or greater. Reapply every 2 to 3 hours or after sweating, drying off with a towel, or swimming. · Always wear a seat belt when traveling in a car. Always wear a helmet when riding a bicycle or motorcycle.

## 2022-05-09 NOTE — PROGRESS NOTES
Medicare Annual Wellness Visit    Cynthia Chowdary is here for Hypertension (6 mo f/u), Hyperlipidemia, Blood Sugar Problem (IFG), Hypothyroidism, Discuss Labs (drawn 4/20/22), and Medicare AWV (subsequent; last 5/21/2020)    Assessment & Plan   Essential hypertension  Peripheral vascular disease ([address]  Mixed hyperlipidemia  Acquired hypothyroidism  Impaired fasting glucose  Medicare annual wellness visit, subsequent      Recommendations for Preventive Services Due: see orders and patient instructions/AVS.  Recommended screening schedule for the next 5-10 years is provided to the patient in written form: see Patient Instructions/AVS.     No follow-ups on file. Subjective       Patient's complete Health Risk Assessment and screening values have been reviewed and are found in Flowsheets. The following problems were reviewed today and where indicated follow up appointments were made and/or referrals ordered. Positive Risk Factor Screenings with Interventions:    Fall Risk:  Do you feel unsteady or are you worried about falling? : (!) yes (utilizes quad cane with ambulation)  2 or more falls in past year?: no  Fall with injury in past year?: no  Timed Up and Go Test > 12 seconds?: (!) yes  Fall Risk Interventions:    · Home safety tips provided    Cognitive:   Words recalled: 0 Words Recalled  Clock Drawing Test (CDT): Normal  Total Score Interpretation: Abnormal Mini-Cog  Did the patient refuse to take the cognition test?: No    Cognitive Impairment Interventions:  · Patient declines any further evaluation/treatment for cognitive impairment           General Health and ACP:  General  In general, how would you say your health is?: Good  In the past 7 days, have you experienced any of the following: New or Increased Pain, New or Increased Fatigue, Loneliness, Social Isolation, Stress or Anger?: No  Do you get the social and emotional support that you need?: Yes  Do you have a Living Will?: Yes    Advance Directives     Power of  Living Will ACP-Advance Directive ACP-Power of     Not on File Not on File Not on File Not on [address] Interventions:  · has living will    Health Habits/Nutrition:     Physical Activity: Inactive    Days of Exercise per Week: 0 days    Minutes of Exercise per Session: 0 min     Have you lost any weight without trying in the past 3 months?: (!) Yes  Body mass index: 19.55  Have you seen the dentist within the past year?: N/A - wear dentures    Health Habits/Nutrition Interventions:  · Nutritional issues:  educational materials for healthy, well-balanced diet provided    Hearing/Vision:  Do you or your family notice any trouble with your hearing that hasn't been managed with hearing aids?: No (left hearing aide)  Do you have difficulty driving, watching TV, or doing any of your daily activities because of your eyesight?: No  Have you had an eye exam within the past year?: (!) No  No exam data present    Hearing/Vision Interventions:  · Vision concerns:  patient encouraged to make appointment with his/her eye specialist     ADLs:  In the past 7 days, did you need help from others to perform any of the following everyday activities: Eating, dressing, grooming, bathing, toileting, or walking/balance?: (!) Yes  Select all that apply: (!) Walking/Balance (quad cane for ambulation; minimal assist with dressing)  In the past 7 days, did you need help from others to take care of any of the following: Laundry, housekeeping, banking/finances, shopping, telephone use, food preparation, transportation, or taking medications?: (!) Yes  Select all that apply: (!) Laundry,Housekeeping,Banking/Finances,Shopping,Food Preparation,Transportation,Taking Medications (son assists with tasks; does wash the dishes)    ADL Interventions:  · son lives with patient and assists with ADLs that she needs assistance with          Objective   Vitals:    05/09/22 1000   BP: 110/60 Site: Left Upper Arm   Position: Sitting   Cuff Size: Medium Adult   Pulse: (!) 48   Resp: 16   Temp: 97.5 °F (36.4 °C)   TempSrc: Tympanic   SpO2: 98%   Weight: 96 lb 12.8 oz (43.9 kg)   Height: 4' 11\" (1.499 m)      Body mass index is 19.55 kg/m². Allergies   Allergen Reactions    Other      She did have an IV antibiotic while she was in the hospital that she had a rash from, but son is unsure of the name of that. Otherwise, she has no known allergies. Prior to Visit Medications    Medication Sig Taking? Authorizing Provider   oxybutynin (DITROPAN-XL) 10 MG extended release tablet Take 1 tablet by mouth once daily Yes Sultana Doty DO   lisinopril (PRINIVIL;ZESTRIL) 2.5 MG tablet Take 1 tablet by mouth once daily Yes Sultana Doty DO   lovastatin (MEVACOR) 20 MG tablet TAKE 2 TABLETS BY MOUTH NIGHTLY Yes Sultana Doty DO   clopidogrel (PLAVIX) 75 MG tablet Take 1 tablet by mouth once daily Yes Sultana Doty DO   levothyroxine (SYNTHROID) 75 MCG tablet Take 1 tablet by mouth once daily Yes Sultana Doty DO   metFORMIN (GLUCOPHAGE) 500 MG tablet Take 1 tablet by mouth once daily with breakfast Yes Sultana Doty DO   metoprolol tartrate (LOPRESSOR) 50 MG tablet Take 1 tablet by mouth twice daily Yes Sultana Doty DO   NONFORMULARY Relaxing leg cream uses prn active ingred: gnaphalium polycephalum 6X HPUS (pain reliever) and causticum 8X HPUS (nighttime sleep aid) Yes Historical Provider, MD   Calcium Carbonate-Vitamin D (CALCIUM 600 + D PO) Take 1 tablet by mouth 2 times daily. Yes Historical Provider, MD   aspirin 325 MG EC tablet Take 325 mg by mouth daily. Yes Historical Provider, MD   Multiple Vitamins-Minerals (MULTIVITAMIN PO) Take 1 tablet by mouth daily.  Multivitamin with iron Yes Historical Provider, MD Werner (Including outside providers/suppliers regularly involved in providing care):   Patient Care Team:  Sultana Doty DO as PCP - General (Family Medicine)  Birgit Holguin DO as PCP - [facility] Empaneled Provider  Vane Patricia MD as Surgeon (Vascular Surgery)    Reviewed and updated this visit:  Tobacco  Allergies  Meds  Problems  Med Hx  Surg Hx  Soc Hx  Fam Hx

## 2022-05-09 NOTE — TELEPHONE ENCOUNTER
Shahana Akins called requesting a refill of the below medication which has been pended for you:     Requested Prescriptions     Pending Prescriptions Disp Refills    oxybutynin (DITROPAN-XL) 10 MG extended release tablet [Pharmacy Med Name: Oxybutynin Chloride ER 10 MG Oral Tablet Extended Release 24 Hour] 90 tablet 1     Sig: Take 1 tablet by mouth once daily       Last Appointment Date: 10/21/2021  Next Appointment Date: 5/9/2022    Allergies   Allergen Reactions    Other      She did have an IV antibiotic while she was in the hospital that she had a rash from, but son is unsure of the name of that. Otherwise, she has no known allergies.

## 2022-09-12 ENCOUNTER — OFFICE VISIT (OUTPATIENT)
Dept: OPTOMETRY | Age: 87
End: 2022-09-12
Payer: MEDICARE

## 2022-09-12 DIAGNOSIS — H53.8 BLURRED VISION, BILATERAL: Primary | ICD-10-CM

## 2022-09-12 DIAGNOSIS — H25.813 COMBINED FORMS OF AGE-RELATED CATARACT OF BOTH EYES: ICD-10-CM

## 2022-09-12 PROCEDURE — 1036F TOBACCO NON-USER: CPT | Performed by: OPTOMETRIST

## 2022-09-12 PROCEDURE — G8420 CALC BMI NORM PARAMETERS: HCPCS | Performed by: OPTOMETRIST

## 2022-09-12 PROCEDURE — 99212 OFFICE O/P EST SF 10 MIN: CPT | Performed by: OPTOMETRIST

## 2022-09-12 PROCEDURE — G8427 DOCREV CUR MEDS BY ELIG CLIN: HCPCS | Performed by: OPTOMETRIST

## 2022-09-12 PROCEDURE — 99204 OFFICE O/P NEW MOD 45 MIN: CPT | Performed by: OPTOMETRIST

## 2022-09-12 PROCEDURE — 1123F ACP DISCUSS/DSCN MKR DOCD: CPT | Performed by: OPTOMETRIST

## 2022-09-12 PROCEDURE — 1090F PRES/ABSN URINE INCON ASSESS: CPT | Performed by: OPTOMETRIST

## 2022-09-12 PROCEDURE — G8400 PT W/DXA NO RESULTS DOC: HCPCS | Performed by: OPTOMETRIST

## 2022-09-12 ASSESSMENT — VISUAL ACUITY
METHOD: SNELLEN - LINEAR
OD_PH_SC: 20/100 OU
OD_CC: 20/100 OU
CORRECTION_TYPE: GLASSES

## 2022-09-12 ASSESSMENT — REFRACTION_MANIFEST
OS_CYLINDER: -1.50
OS_AXIS: 050
OS_SPHERE: +2.25

## 2022-09-12 ASSESSMENT — TONOMETRY
IOP_METHOD: NON-CONTACT AIR PUFF
OS_IOP_MMHG: 15
OD_IOP_MMHG: 13

## 2022-09-12 ASSESSMENT — ENCOUNTER SYMPTOMS
RESPIRATORY NEGATIVE: 0
EYES NEGATIVE: 0
GASTROINTESTINAL NEGATIVE: 0
ALLERGIC/IMMUNOLOGIC NEGATIVE: 0

## 2022-09-12 ASSESSMENT — REFRACTION_WEARINGRX
OD_CYLINDER: -2.00
OS_ADD: +2.50
OS_AXIS: 043
OD_SPHERE: +2.25
OD_AXIS: 178
OS_CYLINDER: -1.00
OS_SPHERE: +1.50
OD_HPRISM: 0.00
OD_ADD: +2.75

## 2022-09-12 NOTE — PROGRESS NOTES
Kimberly Fitch presents today for   Chief Complaint   Patient presents with    Kellee Lucas    Referral - General     Dr. Bertina Bence referral for exam    .    HPI       Blurred Vision              Laterality: both eyes              Referral - General              Comments: Dr. Bertina Bence referral for exam               Comments    Last Vision Exam: unknown  Last Ophthalmology Exam: [address]  Last Filled Glasses Rx: 10+ years  Insurance: Medicare   Update:                   Current Outpatient Medications   Medication Sig Dispense Refill    oxybutynin (DITROPAN-XL) 10 MG extended release tablet Take 1 tablet by mouth once daily 90 tablet 1    metoprolol tartrate (LOPRESSOR) 50 MG tablet Take 1 tablet by mouth twice daily 180 tablet 1    metFORMIN (GLUCOPHAGE) 500 MG tablet Take 1 tablet by mouth once daily with breakfast 90 tablet 1    levothyroxine (SYNTHROID) 75 MCG tablet Take 1 tablet by mouth once daily 90 tablet 1    clopidogrel (PLAVIX) 75 MG tablet Take 1 tablet by mouth once daily 90 tablet 1    lovastatin (MEVACOR) 20 MG tablet TAKE 2 TABLETS BY MOUTH NIGHTLY 180 tablet 1    lisinopril (PRINIVIL;ZESTRIL) 2.5 MG tablet Take 1 tablet by mouth once daily 90 tablet 1    NONFORMULARY Relaxing leg cream uses prn active ingred: gnaphalium polycephalum 6X HPUS (pain reliever) and causticum 8X HPUS (nighttime sleep aid)      Calcium Carbonate-Vitamin D (CALCIUM 600 + D PO) Take 1 tablet by mouth 2 times daily. aspirin 325 MG EC tablet Take 325 mg by mouth daily. Multiple Vitamins-Minerals (MULTIVITAMIN PO) Take 1 tablet by mouth daily. Multivitamin with iron       No current facility-administered medications for this visit.          Family History   Problem Relation Age of Onset    Coronary Art Dis Mother     Hypertension Mother     Diabetes Mother     Coronary Art Dis Father     Hypertension Father     Diabetes Father     Stroke Father     Prostate Cancer Brother     Hypertension Other     Heart Disease Other     Dislocations Other     Thyroid Disease Other      Social History     Socioeconomic History    Marital status:      Spouse name: None    Number of children: None    Years of education: None    Highest education level: None   Tobacco Use    Smoking status: Former     Packs/day: 1.00     Years: 50.00     Pack years: 50.00     Types: Cigarettes    Smokeless tobacco: Never    Tobacco comments:     Smoked 1 pack a day for 50 years' duration but quit some years ago. Substance and Sexual Activity    Alcohol use: No    Drug use: No     Social Determinants of Health     Financial Resource Strain: Low Risk     Difficulty of Paying Living Expenses: Not hard at all   Food Insecurity: No Food Insecurity    Worried About Running Out of Food in the Last Year: Never true    Ran Out of Food in the Last Year: Never true   Physical Activity: Inactive    Days of Exercise per Week: 0 days    Minutes of Exercise per Session: 0 min     Past Medical History:   Diagnosis Date    Carotid artery stenosis     Cataracts, bilateral     [facility] 04-    (Left-sided) - During carotid endarterectomy.     Deafness in right ear     Hyperlipidemia     Hypertension     Hypothyroidism     Neuropathy     Prediabetes        ROS    Negative for: Constitutional, Gastrointestinal, Neurological, Skin, Genitourinary, Musculoskeletal, HENT, Endocrine, Cardiovascular, Eyes, Respiratory, Psychiatric, Allergic/Imm, Heme/Lymph         Main Ophthalmology Exam       External Exam         Right Left    External Normal Normal              Slit Lamp Exam         Right Left    Lens 4+ Nuclear sclerosis 4+ Nuclear sclerosis              Fundus Exam         Right Left    Disc poor view                     <div id=\"MAIN_EXAM_REVIEWED\"></div>      Tonometry       Tonometry (Non-contact air puff, 10:26 AM)         Right Left    Pressure 13 15   IOPg:  10.1             11.5  CH:  9.0          8.5  WS: 3.3          4.4 Not recorded       Not recorded         Visual Acuity (Snellen - Linear)         Right Left    Dist ph sc 20/100 OU     Near cc 20/100 OU       Correction: Glasses            Pupils       Pupils         Pupils    Right PERRL    Left PERRL                  Not recorded       Not recorded           Ophthalmology Exam       Wearing Rx         Sphere Cylinder Axis Add Horz Prism    Right +2.25 -2.00 178 +2.75 0.00    Left +1.50 -1.00 043 +2.50       Age: 5+                  Manifest Refraction       Manifest Refraction         Sphere Cylinder [place] Dist [facility]    Right NI        Left +2.25 -1.50 050 20/80+-              Manifest Refraction #2 (Auto)         Sphere Cylinder Axis Dist VA    Right        Left +2.75 -1.50 068                    Final Rx         Sphere Cylinder Axis Dist VA Add    Right NI     +2.50    Left +2.25 -1.50 050 20/80+- +2.50      Type: do not fill               Orders Placed This Encounter   Procedures    Amb External Referral To Ophthalmology           IMPRESSION:  1. Blurred vision, bilateral    2. Combined forms of age-related cataract of both eyes        PLAN:    2. Referral for cataract evaluation both eyes ; There are no Patient Instructions on file for this visit. No follow-ups on file.

## 2022-11-04 ENCOUNTER — IMMUNIZATION (OUTPATIENT)
Dept: LAB | Age: 87
End: 2022-11-04
Payer: MEDICARE

## 2022-11-04 ENCOUNTER — HOSPITAL ENCOUNTER (OUTPATIENT)
Dept: LAB | Age: 87
Discharge: HOME | End: 2022-11-04
Payer: MEDICARE

## 2022-11-04 DIAGNOSIS — R73.01 IMPAIRED FASTING GLUCOSE: ICD-10-CM

## 2022-11-04 DIAGNOSIS — E03.9 ACQUIRED HYPOTHYROIDISM: ICD-10-CM

## 2022-11-04 DIAGNOSIS — I10 ESSENTIAL HYPERTENSION: ICD-10-CM

## 2022-11-04 DIAGNOSIS — E78.2 MIXED HYPERLIPIDEMIA: ICD-10-CM

## 2022-11-04 LAB
ABSOLUTE EOS #: 0.46 K/UL (ref 0–0.44)
ABSOLUTE IMMATURE GRANULOCYTE: 0.06 K/UL (ref 0–0.3)
ABSOLUTE LYMPH #: 1.58 K/UL (ref 1.1–3.7)
ABSOLUTE MONO #: 0.46 K/UL (ref 0.1–1.2)
ALBUMIN SERPL-MCNC: 3.3 G/DL (ref 3.5–5.2)
ALBUMIN/GLOBULIN RATIO: 1 (ref 1–2.5)
ALP BLD-CCNC: 77 U/L (ref 35–104)
ALT SERPL-CCNC: 27 U/L (ref 5–33)
ANION GAP SERPL CALCULATED.3IONS-SCNC: 7 MMOL/L (ref 9–17)
AST SERPL-CCNC: 47 U/L
BASOPHILS # BLD: 1 % (ref 0–2)
BASOPHILS ABSOLUTE: 0.07 K/UL (ref 0–0.2)
BILIRUB SERPL-MCNC: 0.3 MG/DL (ref 0.3–1.2)
BUN BLDV-MCNC: 33 MG/DL (ref 8–23)
BUN/CREAT BLD: 25 (ref 9–20)
CALCIUM SERPL-MCNC: 8.9 MG/DL (ref 8.6–10.4)
CHLORIDE BLD-SCNC: 106 MMOL/L (ref 98–107)
CO2: 29 MMOL/L (ref 20–31)
CREAT SERPL-MCNC: 1.32 MG/DL (ref 0.5–0.9)
EOSINOPHILS RELATIVE PERCENT: 7 % (ref 1–4)
GFR SERPL CREATININE-BSD FRML MDRD: 40 ML/MIN/1.73M2
GLUCOSE BLD-MCNC: 82 MG/DL (ref 70–99)
HCT VFR BLD CALC: 36.7 % (ref 36.3–47.1)
HEMOGLOBIN: 11.8 G/DL (ref 11.9–15.1)
IMMATURE GRANULOCYTES: 1 %
LYMPHOCYTES # BLD: 24 % (ref 24–43)
MCH RBC QN AUTO: 30.9 PG (ref 25.2–33.5)
MCHC RBC AUTO-ENTMCNC: 32.2 G/DL (ref 25.2–33.5)
MCV RBC AUTO: 96.1 FL (ref 82.6–102.9)
MONOCYTES # BLD: 7 % (ref 3–12)
NRBC AUTOMATED: 0 PER 100 WBC
PDW BLD-RTO: 13.1 % (ref 11.8–14.4)
PLATELET # BLD: 245 K/UL (ref 138–453)
PMV BLD AUTO: 9 FL (ref 8.1–13.5)
POTASSIUM SERPL-SCNC: 4.7 MMOL/L (ref 3.7–5.3)
RBC # BLD: 3.82 M/UL (ref 3.95–5.11)
SEG NEUTROPHILS: 60 % (ref 36–65)
SEGMENTED NEUTROPHILS ABSOLUTE COUNT: 4.07 K/UL (ref 1.5–8.1)
SODIUM BLD-SCNC: 142 MMOL/L (ref 135–144)
TOTAL PROTEIN: 6.5 G/DL (ref 6.4–8.3)
TSH SERPL DL<=0.05 MIU/L-ACNC: 3.4 UIU/ML (ref 0.3–5)
WBC # BLD: 6.7 K/UL (ref 3.5–11.3)

## 2022-11-04 PROCEDURE — 36415 COLL VENOUS BLD VENIPUNCTURE: CPT

## 2022-11-04 PROCEDURE — 84443 ASSAY THYROID STIM HORMONE: CPT

## 2022-11-04 PROCEDURE — 84439 ASSAY OF FREE THYROXINE: CPT

## 2022-11-04 PROCEDURE — 80061 LIPID PANEL: CPT

## 2022-11-04 PROCEDURE — PBSHW COVID-19, MODERNA BIVALENT BOOSTER, (AGE 12Y+), IM, 50 MCG/0.5 ML: Performed by: FAMILY MEDICINE

## 2022-11-04 PROCEDURE — 85025 COMPLETE CBC W/AUTO DIFF WBC: CPT

## 2022-11-04 PROCEDURE — 80053 COMPREHEN METABOLIC PANEL: CPT

## 2022-11-04 PROCEDURE — 83036 HEMOGLOBIN GLYCOSYLATED A1C: CPT

## 2022-11-04 PROCEDURE — 0134A COVID-19, MODERNA BIVALENT BOOSTER, (AGE 12Y+), IM, 50 MCG/0.5 ML: CPT | Performed by: FAMILY MEDICINE

## 2022-11-05 LAB
CHOLESTEROL/HDL RATIO: 2.3
CHOLESTEROL: 149 MG/DL
HDLC SERPL-MCNC: 65 MG/DL
LDL CHOLESTEROL: 74 MG/DL (ref 0–130)
THYROXINE, FREE: 1.29 NG/DL (ref 0.93–1.7)
TRIGL SERPL-MCNC: 52 MG/DL

## 2022-11-06 LAB
ESTIMATED AVERAGE GLUCOSE: 91 MG/DL
HBA1C MFR BLD: 4.8 % (ref 4–6)

## 2022-11-09 ENCOUNTER — OFFICE VISIT (OUTPATIENT)
Dept: FAMILY MEDICINE CLINIC | Age: 87
End: 2022-11-09
Payer: MEDICARE

## 2022-11-09 VITALS
RESPIRATION RATE: 16 BRPM | DIASTOLIC BLOOD PRESSURE: 70 MMHG | TEMPERATURE: 98 F | HEIGHT: 59 IN | OXYGEN SATURATION: 99 % | HEART RATE: 60 BPM | BODY MASS INDEX: 20.16 KG/M2 | WEIGHT: 100 LBS | SYSTOLIC BLOOD PRESSURE: 134 MMHG

## 2022-11-09 DIAGNOSIS — E03.9 ACQUIRED HYPOTHYROIDISM: ICD-10-CM

## 2022-11-09 DIAGNOSIS — N18.32 STAGE 3B CHRONIC KIDNEY DISEASE (HCC): ICD-10-CM

## 2022-11-09 DIAGNOSIS — E44.0 MODERATE PROTEIN-CALORIE MALNUTRITION (HCC): ICD-10-CM

## 2022-11-09 DIAGNOSIS — I10 ESSENTIAL HYPERTENSION: Primary | ICD-10-CM

## 2022-11-09 DIAGNOSIS — R73.01 IMPAIRED FASTING GLUCOSE: ICD-10-CM

## 2022-11-09 DIAGNOSIS — Z23 NEED FOR INFLUENZA VACCINATION: ICD-10-CM

## 2022-11-09 DIAGNOSIS — E78.2 MIXED HYPERLIPIDEMIA: ICD-10-CM

## 2022-11-09 PROBLEM — E46 PROTEIN CALORIE MALNUTRITION (HCC): Status: ACTIVE | Noted: 2022-11-09

## 2022-11-09 PROCEDURE — 99214 OFFICE O/P EST MOD 30 MIN: CPT | Performed by: FAMILY MEDICINE

## 2022-11-09 PROCEDURE — 3074F SYST BP LT 130 MM HG: CPT | Performed by: FAMILY MEDICINE

## 2022-11-09 PROCEDURE — 90694 VACC AIIV4 NO PRSRV 0.5ML IM: CPT | Performed by: FAMILY MEDICINE

## 2022-11-09 PROCEDURE — G8400 PT W/DXA NO RESULTS DOC: HCPCS | Performed by: FAMILY MEDICINE

## 2022-11-09 PROCEDURE — 1090F PRES/ABSN URINE INCON ASSESS: CPT | Performed by: FAMILY MEDICINE

## 2022-11-09 PROCEDURE — PBSHW INFLUENZA, FLUAD, (AGE 65 Y+), IM, PF, 0.5 ML: Performed by: FAMILY MEDICINE

## 2022-11-09 PROCEDURE — 3078F DIAST BP <80 MM HG: CPT | Performed by: FAMILY MEDICINE

## 2022-11-09 PROCEDURE — 99213 OFFICE O/P EST LOW 20 MIN: CPT | Performed by: FAMILY MEDICINE

## 2022-11-09 PROCEDURE — G8484 FLU IMMUNIZE NO ADMIN: HCPCS | Performed by: FAMILY MEDICINE

## 2022-11-09 PROCEDURE — G8420 CALC BMI NORM PARAMETERS: HCPCS | Performed by: FAMILY MEDICINE

## 2022-11-09 PROCEDURE — 1123F ACP DISCUSS/DSCN MKR DOCD: CPT | Performed by: FAMILY MEDICINE

## 2022-11-09 PROCEDURE — 1036F TOBACCO NON-USER: CPT | Performed by: FAMILY MEDICINE

## 2022-11-09 PROCEDURE — G8427 DOCREV CUR MEDS BY ELIG CLIN: HCPCS | Performed by: FAMILY MEDICINE

## 2022-11-09 RX ORDER — METOPROLOL TARTRATE 50 MG/1
TABLET, FILM COATED ORAL
Qty: 180 TABLET | Refills: 1 | Status: SHIPPED | OUTPATIENT
Start: 2022-11-09

## 2022-11-09 RX ORDER — CLOPIDOGREL BISULFATE 75 MG/1
TABLET ORAL
Qty: 90 TABLET | Refills: 1 | Status: SHIPPED | OUTPATIENT
Start: 2022-11-09

## 2022-11-09 RX ORDER — FERROUS SULFATE 325(65) MG
325 TABLET ORAL
COMMUNITY

## 2022-11-09 RX ORDER — LISINOPRIL 2.5 MG/1
TABLET ORAL
Qty: 90 TABLET | Refills: 1 | Status: SHIPPED | OUTPATIENT
Start: 2022-11-09

## 2022-11-09 RX ORDER — OXYBUTYNIN CHLORIDE 10 MG/1
TABLET, EXTENDED RELEASE ORAL
Qty: 90 TABLET | Refills: 1 | Status: SHIPPED | OUTPATIENT
Start: 2022-11-09

## 2022-11-09 RX ORDER — LEVOTHYROXINE SODIUM 0.07 MG/1
TABLET ORAL
Qty: 90 TABLET | Refills: 1 | Status: SHIPPED | OUTPATIENT
Start: 2022-11-09

## 2022-11-09 RX ORDER — LOVASTATIN 20 MG/1
TABLET ORAL
Qty: 180 TABLET | Refills: 1 | Status: SHIPPED | OUTPATIENT
Start: 2022-11-09

## 2022-11-09 ASSESSMENT — ENCOUNTER SYMPTOMS
EYE DISCHARGE: 0
WHEEZING: 0
CONSTIPATION: 0
COUGH: 0
SHORTNESS OF BREATH: 0
SORE THROAT: 0
RHINORRHEA: 0
NAUSEA: 0
EYE REDNESS: 0
DIARRHEA: 0
SINUS PRESSURE: 0
VOMITING: 0
ABDOMINAL PAIN: 0
TROUBLE SWALLOWING: 0

## 2022-11-09 NOTE — PROGRESS NOTES
Chief Complaint   Patient presents with     Phimosis     Pt has difficulty pulling pack foreskin, pt states it has gotten to the point where he does not try to pull it back anymore. Pt states he occasionally has painful urination and will have to double void to empty    PVR: 35 mL    Susan Cornejo, CMA   Patient declines tdap and shingles vaccines.

## 2022-11-09 NOTE — PROGRESS NOTES
2022     Cathi Velásquez (:  1935) is a 82 y.o. female, here for evaluation of the following medical concerns:    HPI  Patient comes in today for follow up of chronic health issues Patient has gained some weight since her last office visit. They are trying to really work on increasing her caloric intake in her diet. She is up to 100 pounds. Would continue with encouraging increased caloric intake to help with weight gain. She does have a known history of hypertension and her blood pressure is stable and controlled on her current medical therapy. Has known hyperlipidemia and her cholesterol levels are stable and controlled on her current statin dose. Has a known history of hypothyroidism and her thyroid levels are stable and adequately supplemented on her current thyroid dose. Has known impaired fasting glucose and her blood sugar levels are stable at this time. With known chronic kidney disease and normal blood sugar levels I think we can go ahead and stop her metformin therapy and just monitor for any changes in her blood sugar control. She is hesitant to do so but is agreeable ultimately. Does have known history of anemia and since starting on iron supplemental therapy her hemoglobin is almost back to normal range. Patient otherwise has no other acute medical concerns. .  Patient's recent lab reports are as follows:    Results for orders placed or performed during the hospital encounter of 22   T4, Free   Result Value Ref Range    Thyroxine, Free 1.29 0.93 - 1.70 ng/dL   CBC with Auto Differential   Result Value Ref Range    WBC 6.7 3.5 - 11.3 k/uL    RBC 3.82 (L) 3.95 - 5.11 m/uL    Hemoglobin 11.8 (L) 11.9 - 15.1 g/dL    Hematocrit 36.7 36.3 - 47.1 %    MCV 96.1 82.6 - 102.9 fL    MCH 30.9 25.2 - 33.5 pg    MCHC 32.2 25.2 - 33.5 g/dL    RDW 13.1 11.8 - 14.4 %    Platelets 898 407 - 368 k/uL    MPV 9.0 8.1 - 13.5 fL    NRBC Automated 0.0 0.0 per 100 WBC    Seg Neutrophils 60 36 If pt only saw one line then it is negative  Need to see 2 lines to be positive--one is the control and other is the test result  Please clarify or if she cannot recall then ask that she retest--thanks - 65 %    Lymphocytes 24 24 - 43 %    Monocytes 7 3 - 12 %    Eosinophils % 7 (H) 1 - 4 %    Basophils 1 0 - 2 %    Immature Granulocytes 1 (H) 0 %    Segs Absolute 4.07 1.50 - 8.10 k/uL    Absolute Lymph # 1.58 1.10 - 3.70 k/uL    Absolute Mono # 0.46 0.10 - 1.20 k/uL    Absolute Eos # 0.46 (H) 0.00 - 0.44 k/uL    Basophils Absolute 0.07 0.00 - 0.20 k/uL    Absolute Immature Granulocyte 0.06 0.00 - 0.30 k/uL   Comprehensive Metabolic Panel   Result Value Ref Range    Glucose 82 70 - 99 mg/dL    BUN 33 (H) 8 - 23 mg/dL    Creatinine 1.32 (H) 0.50 - 0.90 mg/dL    Est, Glom Filt Rate 40 (L) >60 mL/min/1.73m2    Bun/Cre Ratio 25 (H) 9 - 20    Calcium 8.9 8.6 - 10.4 mg/dL    Sodium 142 135 - 144 mmol/L    Potassium 4.7 3.7 - 5.3 mmol/L    Chloride 106 98 - 107 mmol/L    CO2 29 20 - 31 mmol/L    Anion Gap 7 (L) 9 - 17 mmol/L    Alkaline Phosphatase 77 35 - 104 U/L    ALT 27 5 - 33 U/L    AST 47 (H) <32 U/L    Total Bilirubin 0.3 0.3 - 1.2 mg/dL    Total Protein 6.5 6.4 - 8.3 g/dL    Albumin 3.3 (L) 3.5 - 5.2 g/dL    Albumin/Globulin Ratio 1.0 1.0 - 2.5   Hemoglobin A1C   Result Value Ref Range    Hemoglobin A1C 4.8 4.0 - 6.0 %    Estimated Avg Glucose 91 mg/dL   Lipid Panel   Result Value Ref Range    Cholesterol 149 <200 mg/dL    HDL 65 >40 mg/dL    LDL Cholesterol 74 0 - 130 mg/dL    Chol/HDL Ratio 2.3 <5    Triglycerides 52 <150 mg/dL   TSH   Result Value Ref Range    TSH 3.40 0.30 - 5.00 uIU/mL      Other review of systems are as noted below. Preventative measures are reviewed today. See health maintenance section for complete preventative plan of care. Did review patient's med list, allergies, social history, fam history, pmhx and pshx today as noted in the record. Review of Systems   Constitutional:  Negative for chills, fatigue and fever. HENT:  Negative for congestion, ear pain, postnasal drip, rhinorrhea, sinus pressure, sore throat and trouble swallowing.     Eyes:  Negative for discharge and redness. Respiratory:  Negative for cough, shortness of breath and wheezing. Cardiovascular:  Negative for chest pain. Gastrointestinal:  Negative for abdominal pain, constipation, diarrhea, nausea and vomiting. Genitourinary:  Negative for dysuria, flank pain, frequency and urgency. Musculoskeletal:  Negative for arthralgias, myalgias and neck pain. Skin:  Negative for rash and wound. Allergic/Immunologic: Negative for environmental allergies. Neurological:  Negative for dizziness, weakness, light-headedness and headaches. Hematological:  Negative for adenopathy. Psychiatric/Behavioral: Negative. Prior to Visit Medications    Medication Sig Taking? Authorizing Provider   oxybutynin (DITROPAN-XL) 10 MG extended release tablet Take 1 tablet by mouth once daily  Silvia Teixeira, DO   metoprolol tartrate (LOPRESSOR) 50 MG tablet Take 1 tablet by mouth twice daily  [facility], DO   metFORMIN (GLUCOPHAGE) 500 MG tablet Take 1 tablet by mouth once daily with breakfast  [facility] Linwis[facility], DO   levothyroxine (SYNTHROID) 75 MCG tablet Take 1 tablet by mouth once daily  Silvia Teixeira, DO   clopidogrel (PLAVIX) 75 MG tablet Take 1 tablet by mouth once daily  Silvia Teixeira, DO   lovastatin (MEVACOR) 20 MG tablet TAKE 2 TABLETS BY MOUTH NIGHTLY  Silvia Teixeira, DO   lisinopril (PRINIVIL;ZESTRIL) 2.5 MG tablet Take 1 tablet by mouth once daily  Silvia Teixeira, DO   NONFORMULARY Relaxing leg cream uses prn active ingred: gnaphalium polycephalum 6X HPUS (pain reliever) and causticum 8X HPUS (nighttime sleep aid)  Historical Provider, MD   Calcium Carbonate-Vitamin D (CALCIUM 600 + D PO) Take 1 tablet by mouth 2 times daily. Historical Provider, MD   aspirin 325 MG EC tablet Take 325 mg by mouth daily. Historical Provider, MD   Multiple Vitamins-Minerals (MULTIVITAMIN PO) Take 1 tablet by mouth daily.  Multivitamin with iron  Historical Provider, MD        Social History Tobacco Use    Smoking status: Former     Packs/day: 1.00     Years: 50.00     Pack years: 50.00     Types: Cigarettes    Smokeless tobacco: Never    Tobacco comments:     Smoked 1 pack a day for 50 years' duration but quit some years ago. Substance Use Topics    Alcohol use: No        There were no vitals filed for this visit. Estimated body mass index is 19.55 kg/m² as calculated from the following:    Height as of 5/9/22: 4' 11\" (1.499 m). Weight as of 5/9/22: 96 lb 12.8 oz (43.9 kg). Physical Exam  Vitals and nursing note reviewed. Constitutional:       General: She is not in acute distress. Appearance: Normal appearance. She is well-developed. She is not diaphoretic. HENT:      Head: Normocephalic and atraumatic. Right Ear: Tympanic membrane, ear canal and external ear normal.      Left Ear: Tympanic membrane, ear canal and external ear normal.      Nose: Nose normal.      Mouth/Throat:      Mouth: Mucous membranes are moist.      Pharynx: Oropharynx is clear. No oropharyngeal exudate. Eyes:      General:         Right eye: No discharge. Left eye: No discharge. Conjunctiva/sclera: Conjunctivae normal.      Pupils: Pupils are equal, round, and reactive to light. Neck:      Thyroid: No thyromegaly. Cardiovascular:      Rate and Rhythm: Normal rate and regular rhythm. Heart sounds: Normal heart sounds. Pulmonary:      Effort: Pulmonary effort is normal.      Breath sounds: Normal breath sounds. No wheezing or rales. Abdominal:      General: Bowel sounds are normal. There is no distension. Palpations: Abdomen is soft. Tenderness: There is no abdominal tenderness. Musculoskeletal:      Cervical back: Normal range of motion and neck supple. Lymphadenopathy:      Cervical: No cervical adenopathy. Skin:     General: Skin is warm and dry. Findings: No rash. Neurological:      Mental Status: She is alert and oriented to person, place, and time. Psychiatric:         Behavior: Behavior normal.         Thought Content: Thought content normal.         Judgment: Judgment normal.         ASSESSMENT/PLAN:  Encounter Diagnoses   Name Primary?     Essential hypertension Yes    Mixed hyperlipidemia     Acquired hypothyroidism     Impaired fasting glucose     Stage 3b chronic kidney disease (HCC)     Moderate protein-calorie malnutrition (HCC)     Need for influenza vaccination      Orders Placed This Encounter   Medications    oxybutynin (DITROPAN-XL) 10 MG extended release tablet     Sig: Take 1 tablet by mouth once daily     Dispense:  90 tablet     Refill:  1    metoprolol tartrate (LOPRESSOR) 50 MG tablet     Sig: Take 1 tablet by mouth twice daily     Dispense:  180 tablet     Refill:  1    levothyroxine (SYNTHROID) 75 MCG tablet     Sig: Take 1 tablet by mouth once daily     Dispense:  90 tablet     Refill:  1    clopidogrel (PLAVIX) 75 MG tablet     Sig: Take 1 tablet by mouth once daily     Dispense:  90 tablet     Refill:  1    lovastatin (MEVACOR) 20 MG tablet     Sig: TAKE 2 TABLETS BY MOUTH NIGHTLY     Dispense:  180 tablet     Refill:  1    lisinopril (PRINIVIL;ZESTRIL) 2.5 MG tablet     Sig: Take 1 tablet by mouth once daily     Dispense:  90 tablet     Refill:  1     Orders Placed This Encounter   Procedures    Influenza, FLUAD, (age 72 y+), IM, Preservative Free, 0.5 mL    CBC with Auto Differential     Standing Status:   Future     Standing Expiration Date:   11/9/2023    Comprehensive Metabolic Panel     Standing Status:   Future     Standing Expiration Date:   11/9/2023    Hemoglobin A1C     Standing Status:   Future     Standing Expiration Date:   11/9/2023    Lipid Panel     Standing Status:   Future     Standing Expiration Date:   11/9/2023     Order Specific Question:   Is Patient Fasting?/# of Hours     Answer:   12 hours    TSH     Standing Status:   Future     Standing Expiration Date:   11/9/2023    T4, Free     Standing Status:   Future Standing Expiration Date:   11/9/2023     Patient is to stop her metformin therapy and we will continue to monitor her blood sugar levels and kidney function. Patient is to continue to try to increase her dietary intake to help with maintaining weight. She does have moderate protein calorie malnutrition and so would benefit from additional increase protein sources. Patient is to continue on the rest of her current medical regimen. No additional changes are made at this time. Patient is to return to my office in 6 months duration or sooner if any further problems or symptoms arise. (Please note that portions of this note were completed with a voice recognition program. Efforts were made to edit the dictations but occasionally words are mis-transcribed.)      No follow-ups on file. An electronic signature was used to authenticate this note.     --Bill Rodriguez, DO on 11/9/2022 at 7:15 AM

## 2022-11-09 NOTE — PATIENT INSTRUCTIONS
Hospital Outpatient Visit on 11/04/2022   Component Date Value Ref Range Status    Thyroxine, Free 11/04/2022 1.29  0.93 - 1.70 ng/dL Final    WBC 11/04/2022 6.7  3.5 - 11.3 k/uL Final    RBC 11/04/2022 3.82 (A)  3.95 - 5.11 m/uL Final    Hemoglobin 11/04/2022 11.8 (A)  11.9 - 15.1 g/dL Final    Hematocrit 11/04/2022 36.7  36.3 - 47.1 % Final    MCV 11/04/2022 96.1  82.6 - 102.9 fL Final    MCH 11/04/2022 30.9  25.2 - 33.5 pg Final    MCHC 11/04/2022 32.2  25.2 - 33.5 g/dL Final    RDW 11/04/2022 13.1  11.8 - 14.4 % Final    Platelets 81/38/2887 245  138 - 453 k/uL Final    MPV 11/04/2022 9.0  8.1 - 13.5 fL Final    NRBC Automated 11/04/2022 0.0  0.0 per 100 WBC Final    Seg Neutrophils 11/04/2022 60  36 - 65 % Final    Lymphocytes 11/04/2022 24  24 - 43 % Final    Monocytes 11/04/2022 7  3 - 12 % Final    Eosinophils % 11/04/2022 7 (A)  1 - 4 % Final    Basophils 11/04/2022 1  0 - 2 % Final    Immature Granulocytes 11/04/2022 1 (A)  0 % Final    Segs Absolute 11/04/2022 4.07  1.50 - 8.10 k/uL Final    Absolute Lymph # 11/04/2022 1.58  1.10 - 3.70 k/uL Final    Absolute Mono # 11/04/2022 0.46  0.10 - 1.20 k/uL Final    Absolute Eos # 11/04/2022 0.46 (A)  0.00 - 0.44 k/uL Final    Basophils Absolute 11/04/2022 0.07  0.00 - 0.20 k/uL Final    Absolute Immature Granulocyte 11/04/2022 0.06  0.00 - 0.30 k/uL Final    Glucose 11/04/2022 82  70 - 99 mg/dL Final    BUN 11/04/2022 33 (A)  8 - 23 mg/dL Final    Creatinine 11/04/2022 1.32 (A)  0.50 - 0.90 mg/dL Final    Est, Glom Filt Rate 11/04/2022 40 (A)  >60 mL/min/1.73m2 Final    Comment:       Effective Oct 3, 2022        These results are not intended for use in patients <25years of age. eGFR results are calculated without a race factor using the 2021 CKD-EPI equation. Careful clinical correlation is recommended, particularly when comparing to results   calculated using previous equations.   The CKD-EPI equation is less accurate in patients with extremes of muscle mass, extra-renal   metabolism of creatine, excessive creatine ingestion, or following therapy that affects   renal tubular secretion. Bun/Cre Ratio 11/04/2022 25 (A)  9 - 20 Final    Calcium 11/04/2022 8.9  8.6 - 10.4 mg/dL Final    Sodium 11/04/2022 142  135 - 144 mmol/L Final    Potassium 11/04/2022 4.7  3.7 - 5.3 mmol/L Final    Chloride 11/04/2022 106  98 - 107 mmol/L Final    CO2 11/04/2022 29  20 - 31 mmol/L Final    Anion Gap 11/04/2022 7 (A)  9 - 17 mmol/L Final    Alkaline Phosphatase 11/04/2022 77  35 - 104 U/L Final    ALT 11/04/2022 27  5 - 33 U/L Final    AST 11/04/2022 47 (A)  <32 U/L Final    Total Bilirubin 11/04/2022 0.3  0.3 - 1.2 mg/dL Final    Total Protein 11/04/2022 6.5  6.4 - 8.3 g/dL Final    Albumin 11/04/2022 3.3 (A)  3.5 - 5.2 g/dL Final    Albumin/Globulin Ratio 11/04/2022 1.0  1.0 - 2.5 Final    Hemoglobin A1C 11/04/2022 4.8  4.0 - 6.0 % Final    Estimated Avg Glucose 11/04/2022 91  mg/dL Final    Comment: The ADA and AACC recommend providing the estimated average glucose result to permit better   patient understanding of their HBA1c result. Cholesterol 11/04/2022 149  <200 mg/dL Final    Comment:    Cholesterol Guidelines:      <200  Desirable   200-240  Borderline      >240  Undesirable         HDL 11/04/2022 65  >40 mg/dL Final    Comment:    HDL Guidelines:    <40     Undesirable   40-59    Borderline    >59     Desirable         LDL Cholesterol 11/04/2022 74  0 - 130 mg/dL Final    Comment:    LDL Guidelines:     <100    Desirable   100-129   Near to/above Desirable   130-159   Borderline      >159   Undesirable     Direct (measured) LDL and calculated LDL are not interchangeable tests.       Chol/HDL Ratio 11/04/2022 2.3  <5 Final            Triglycerides 11/04/2022 52  <150 mg/dL Final    Comment:    Triglyceride Guidelines:     <150   Desirable   150-199  Borderline   200-499  High     >499   Very high   Based on AHA Guidelines for fasting triglyceride, October 2012.         TSH 11/04/2022 3.40  0.30 - 5.00 uIU/mL Final

## 2022-11-11 ENCOUNTER — TELEPHONE (OUTPATIENT)
Dept: FAMILY MEDICINE CLINIC | Age: 87
End: 2022-11-11

## 2022-11-11 DIAGNOSIS — R35.0 URINARY FREQUENCY: Primary | ICD-10-CM

## 2022-11-11 NOTE — TELEPHONE ENCOUNTER
Seems unlikely that stopping metformin would have caused significant increased urinary frequency considering that her blood sugars are not elevated. Agree with needing to check for infection. We did just send a refill in for her Ditropan. Would make sure she is taking this consistently as she does have a known history of urinary frequency.

## 2022-11-11 NOTE — TELEPHONE ENCOUNTER
Patients son called stating he did not give his mother the Metformin yesterday and she was up all night and this morning with frequent urination. He would like the Metformin to be refilled so she can restart this medication. Advised him this was stopped due to her elevated kidney functions and due to her lower A1C. Advised him this is not  a common side effect when stopping Metformin and advised him we should check a urine specimen on her to make sure she does not have a UTI. He became upset because he states they have no transportation and we should have checked her for this when she was at [facility] earlier this week. Advised him insurance will only pay to have a urine checked if patient is having signs or symptoms of an infection and they did not mention any of this at the appointment. Advised him I would place an order for a urine and culture if he could bring in a urine today or Saturday.

## 2022-11-14 ENCOUNTER — HOSPITAL ENCOUNTER (OUTPATIENT)
Age: 87
Setting detail: SPECIMEN
Discharge: HOME | End: 2022-11-14
Payer: MEDICARE

## 2022-11-14 DIAGNOSIS — R35.0 URINARY FREQUENCY: ICD-10-CM

## 2022-11-14 LAB
BACTERIA: ABNORMAL
BILIRUBIN URINE: NEGATIVE
EPITHELIAL CELLS UA: ABNORMAL /HPF (ref 0–5)
GLUCOSE URINE: NEGATIVE
KETONES, URINE: NEGATIVE
LEUKOCYTE ESTERASE, URINE: NEGATIVE
NITRITE, URINE: NEGATIVE
PH UA: 6.5 (ref 5–6)
PROTEIN UA: NEGATIVE
RBC UA: ABNORMAL /HPF (ref 0–4)
SPECIFIC GRAVITY UA: 1.01 (ref 1.01–1.02)
URINE HGB: NEGATIVE
UROBILINOGEN, URINE: NORMAL
WBC UA: ABNORMAL /HPF (ref 0–4)

## 2022-11-14 PROCEDURE — 87086 URINE CULTURE/COLONY COUNT: CPT

## 2022-11-14 PROCEDURE — 81001 URINALYSIS AUTO W/SCOPE: CPT

## 2022-11-15 DIAGNOSIS — N18.32 STAGE 3B CHRONIC KIDNEY DISEASE (HCC): Primary | ICD-10-CM

## 2022-11-15 DIAGNOSIS — R35.0 URINARY FREQUENCY: Primary | ICD-10-CM

## 2022-11-15 LAB
CULTURE: NORMAL
SPECIMEN DESCRIPTION: NORMAL

## 2023-02-20 NOTE — TELEPHONE ENCOUNTER
Lucy Maxon called requesting a refill of the below medication which has been pended for you:     Requested Prescriptions     Pending Prescriptions Disp Refills    metFORMIN (GLUCOPHAGE) 500 MG tablet [Pharmacy Med Name: metFORMIN HCl 500 MG Oral Tablet] 90 tablet 0     Sig: Take 1 tablet by mouth once daily with breakfast       Last Appointment Date: 11/9/2022  Next Appointment Date: 5/9/2023    Allergies   Allergen Reactions    Other      She did have an IV antibiotic while she was in the hospital that she had a rash from, but son is unsure of the name of that. Otherwise, she has no known allergies.

## 2023-05-06 ENCOUNTER — HOSPITAL ENCOUNTER (OUTPATIENT)
Age: 88
Discharge: HOME | End: 2023-05-06
Payer: MEDICARE

## 2023-05-06 DIAGNOSIS — R73.01 IMPAIRED FASTING GLUCOSE: ICD-10-CM

## 2023-05-06 DIAGNOSIS — E78.2 MIXED HYPERLIPIDEMIA: ICD-10-CM

## 2023-05-06 DIAGNOSIS — I10 ESSENTIAL HYPERTENSION: ICD-10-CM

## 2023-05-06 DIAGNOSIS — E03.9 ACQUIRED HYPOTHYROIDISM: ICD-10-CM

## 2023-05-06 DIAGNOSIS — N18.32 STAGE 3B CHRONIC KIDNEY DISEASE (HCC): ICD-10-CM

## 2023-05-06 LAB
ABSOLUTE EOS #: 0.46 K/UL (ref 0–0.44)
ABSOLUTE IMMATURE GRANULOCYTE: 0.05 K/UL (ref 0–0.3)
ABSOLUTE LYMPH #: 1.13 K/UL (ref 1.1–3.7)
ABSOLUTE MONO #: 0.53 K/UL (ref 0.1–1.2)
ALBUMIN SERPL-MCNC: 3.6 G/DL (ref 3.5–5.2)
ALBUMIN/GLOBULIN RATIO: 0.9 (ref 1–2.5)
ALP SERPL-CCNC: 99 U/L (ref 35–104)
ALT SERPL-CCNC: 28 U/L (ref 5–33)
ANION GAP SERPL CALCULATED.3IONS-SCNC: 7 MMOL/L (ref 9–17)
AST SERPL-CCNC: 51 U/L
BASOPHILS # BLD: 1 % (ref 0–2)
BASOPHILS ABSOLUTE: 0.07 K/UL (ref 0–0.2)
BILIRUB SERPL-MCNC: 0.5 MG/DL (ref 0.3–1.2)
BUN SERPL-MCNC: 40 MG/DL (ref 8–23)
BUN/CREAT BLD: 29 (ref 9–20)
CALCIUM SERPL-MCNC: 9.2 MG/DL (ref 8.6–10.4)
CHLORIDE SERPL-SCNC: 103 MMOL/L (ref 98–107)
CHOLEST SERPL-MCNC: 159 MG/DL
CHOLESTEROL/HDL RATIO: 2.6
CO2 SERPL-SCNC: 30 MMOL/L (ref 20–31)
CREAT SERPL-MCNC: 1.37 MG/DL (ref 0.5–0.9)
EOSINOPHILS RELATIVE PERCENT: 7 % (ref 1–4)
GFR SERPL CREATININE-BSD FRML MDRD: 38 ML/MIN/1.73M2
GLUCOSE SERPL-MCNC: 93 MG/DL (ref 70–99)
HCT VFR BLD AUTO: 41.5 % (ref 36.3–47.1)
HDLC SERPL-MCNC: 62 MG/DL
HGB BLD-MCNC: 13.3 G/DL (ref 11.9–15.1)
IMMATURE GRANULOCYTES: 1 %
LDLC SERPL CALC-MCNC: 83 MG/DL (ref 0–130)
LYMPHOCYTES # BLD: 16 % (ref 24–43)
MCH RBC QN AUTO: 30 PG (ref 25.2–33.5)
MCHC RBC AUTO-ENTMCNC: 32 G/DL (ref 25.2–33.5)
MCV RBC AUTO: 93.5 FL (ref 82.6–102.9)
MONOCYTES # BLD: 8 % (ref 3–12)
NRBC AUTOMATED: 0 PER 100 WBC
PDW BLD-RTO: 12.5 % (ref 11.8–14.4)
PLATELET # BLD AUTO: 239 K/UL (ref 138–453)
PMV BLD AUTO: 9.1 FL (ref 8.1–13.5)
POTASSIUM SERPL-SCNC: 4.5 MMOL/L (ref 3.7–5.3)
PROT SERPL-MCNC: 7.7 G/DL (ref 6.4–8.3)
RBC # BLD: 4.44 M/UL (ref 3.95–5.11)
SEG NEUTROPHILS: 67 % (ref 36–65)
SEGMENTED NEUTROPHILS ABSOLUTE COUNT: 4.84 K/UL (ref 1.5–8.1)
SODIUM SERPL-SCNC: 140 MMOL/L (ref 135–144)
T4 FREE SERPL-MCNC: 1.3 NG/DL (ref 0.9–1.7)
TRIGL SERPL-MCNC: 70 MG/DL
TSH SERPL-ACNC: 6.05 UIU/ML (ref 0.3–5)
WBC # BLD AUTO: 7.1 K/UL (ref 3.5–11.3)

## 2023-05-06 PROCEDURE — 83036 HEMOGLOBIN GLYCOSYLATED A1C: CPT

## 2023-05-06 PROCEDURE — 80061 LIPID PANEL: CPT

## 2023-05-06 PROCEDURE — 36415 COLL VENOUS BLD VENIPUNCTURE: CPT

## 2023-05-06 PROCEDURE — 84439 ASSAY OF FREE THYROXINE: CPT

## 2023-05-06 PROCEDURE — 80053 COMPREHEN METABOLIC PANEL: CPT

## 2023-05-06 PROCEDURE — 85025 COMPLETE CBC W/AUTO DIFF WBC: CPT

## 2023-05-06 PROCEDURE — 84443 ASSAY THYROID STIM HORMONE: CPT

## 2023-05-07 LAB
EST. AVERAGE GLUCOSE BLD GHB EST-MCNC: 103 MG/DL
HBA1C MFR BLD: 5.2 % (ref 4–6)

## 2023-05-09 ENCOUNTER — OFFICE VISIT (OUTPATIENT)
Dept: FAMILY MEDICINE CLINIC | Age: 88
End: 2023-05-09
Payer: MEDICARE

## 2023-05-09 VITALS
SYSTOLIC BLOOD PRESSURE: 138 MMHG | WEIGHT: 97 LBS | DIASTOLIC BLOOD PRESSURE: 74 MMHG | BODY MASS INDEX: 19.56 KG/M2 | HEART RATE: 60 BPM | OXYGEN SATURATION: 99 % | TEMPERATURE: 97 F | HEIGHT: 59 IN | RESPIRATION RATE: 22 BRPM

## 2023-05-09 DIAGNOSIS — E78.2 MIXED HYPERLIPIDEMIA: ICD-10-CM

## 2023-05-09 DIAGNOSIS — R73.01 IMPAIRED FASTING GLUCOSE: ICD-10-CM

## 2023-05-09 DIAGNOSIS — N18.32 STAGE 3B CHRONIC KIDNEY DISEASE (HCC): ICD-10-CM

## 2023-05-09 DIAGNOSIS — I73.9 PERIPHERAL VASCULAR DISEASE (HCC): ICD-10-CM

## 2023-05-09 DIAGNOSIS — E44.0 MODERATE PROTEIN-CALORIE MALNUTRITION (HCC): ICD-10-CM

## 2023-05-09 DIAGNOSIS — I10 ESSENTIAL HYPERTENSION: ICD-10-CM

## 2023-05-09 DIAGNOSIS — E03.9 ACQUIRED HYPOTHYROIDISM: Primary | ICD-10-CM

## 2023-05-09 DIAGNOSIS — Z00.00 MEDICARE ANNUAL WELLNESS VISIT, SUBSEQUENT: ICD-10-CM

## 2023-05-09 PROCEDURE — 1090F PRES/ABSN URINE INCON ASSESS: CPT | Performed by: FAMILY MEDICINE

## 2023-05-09 PROCEDURE — G8420 CALC BMI NORM PARAMETERS: HCPCS | Performed by: FAMILY MEDICINE

## 2023-05-09 PROCEDURE — G0439 PPPS, SUBSEQ VISIT: HCPCS | Performed by: FAMILY MEDICINE

## 2023-05-09 PROCEDURE — G8427 DOCREV CUR MEDS BY ELIG CLIN: HCPCS | Performed by: FAMILY MEDICINE

## 2023-05-09 PROCEDURE — 99214 OFFICE O/P EST MOD 30 MIN: CPT | Performed by: FAMILY MEDICINE

## 2023-05-09 PROCEDURE — 99213 OFFICE O/P EST LOW 20 MIN: CPT | Performed by: FAMILY MEDICINE

## 2023-05-09 PROCEDURE — 1123F ACP DISCUSS/DSCN MKR DOCD: CPT | Performed by: FAMILY MEDICINE

## 2023-05-09 PROCEDURE — 1036F TOBACCO NON-USER: CPT | Performed by: FAMILY MEDICINE

## 2023-05-09 RX ORDER — OXYBUTYNIN CHLORIDE 10 MG/1
TABLET, EXTENDED RELEASE ORAL
Qty: 90 TABLET | Refills: 1 | Status: SHIPPED | OUTPATIENT
Start: 2023-05-09

## 2023-05-09 RX ORDER — LOVASTATIN 20 MG/1
TABLET ORAL
Qty: 180 TABLET | Refills: 1 | Status: SHIPPED | OUTPATIENT
Start: 2023-05-09

## 2023-05-09 RX ORDER — METOPROLOL TARTRATE 50 MG/1
TABLET, FILM COATED ORAL
Qty: 180 TABLET | Refills: 1 | Status: SHIPPED | OUTPATIENT
Start: 2023-05-09

## 2023-05-09 RX ORDER — CLOPIDOGREL BISULFATE 75 MG/1
TABLET ORAL
Qty: 90 TABLET | Refills: 1 | Status: SHIPPED | OUTPATIENT
Start: 2023-05-09

## 2023-05-09 RX ORDER — LISINOPRIL 2.5 MG/1
TABLET ORAL
Qty: 90 TABLET | Refills: 1 | Status: SHIPPED | OUTPATIENT
Start: 2023-05-09

## 2023-05-09 RX ORDER — LEVOTHYROXINE SODIUM 88 UG/1
TABLET ORAL
Qty: 90 TABLET | Refills: 1 | Status: SHIPPED | OUTPATIENT
Start: 2023-05-09

## 2023-05-09 SDOH — ECONOMIC STABILITY: HOUSING INSECURITY
IN THE LAST 12 MONTHS, WAS THERE A TIME WHEN YOU DID NOT HAVE A STEADY PLACE TO SLEEP OR SLEPT IN A SHELTER (INCLUDING NOW)?: NO

## 2023-05-09 SDOH — ECONOMIC STABILITY: FOOD INSECURITY: WITHIN THE PAST 12 MONTHS, YOU WORRIED THAT YOUR FOOD WOULD RUN OUT BEFORE YOU GOT MONEY TO BUY MORE.: NEVER TRUE

## 2023-05-09 SDOH — ECONOMIC STABILITY: INCOME INSECURITY: HOW HARD IS IT FOR YOU TO PAY FOR THE VERY BASICS LIKE FOOD, HOUSING, MEDICAL CARE, AND HEATING?: NOT HARD AT ALL

## 2023-05-09 SDOH — ECONOMIC STABILITY: FOOD INSECURITY: WITHIN THE PAST 12 MONTHS, THE FOOD YOU BOUGHT JUST DIDN'T LAST AND YOU DIDN'T HAVE MONEY TO GET MORE.: NEVER TRUE

## 2023-05-09 ASSESSMENT — ENCOUNTER SYMPTOMS
SINUS PRESSURE: 0
CONSTIPATION: 0
WHEEZING: 0
NAUSEA: 0
EYE DISCHARGE: 0
COUGH: 0
SORE THROAT: 0
DIARRHEA: 0
TROUBLE SWALLOWING: 1
RHINORRHEA: 0
ABDOMINAL PAIN: 0
EYE REDNESS: 0
VOMITING: 0

## 2023-05-09 ASSESSMENT — PATIENT HEALTH QUESTIONNAIRE - PHQ9
SUM OF ALL RESPONSES TO PHQ QUESTIONS 1-9: 0
2. FEELING DOWN, DEPRESSED OR HOPELESS: 0
SUM OF ALL RESPONSES TO PHQ QUESTIONS 1-9: 0
SUM OF ALL RESPONSES TO PHQ9 QUESTIONS 1 & 2: 0
1. LITTLE INTEREST OR PLEASURE IN DOING THINGS: 0

## 2023-05-09 ASSESSMENT — LIFESTYLE VARIABLES
HOW OFTEN DO YOU HAVE A DRINK CONTAINING ALCOHOL: NEVER
HOW MANY STANDARD DRINKS CONTAINING ALCOHOL DO YOU HAVE ON A TYPICAL DAY: PATIENT DOES NOT DRINK

## 2023-05-09 NOTE — PROGRESS NOTES
Medicare Annual Wellness Visit    Marguerite Collins is here for Hypertension (6 mo f/u), Hyperlipidemia, Blood Sugar Problem (IFG), Hypothyroidism, Discuss Labs (Drawn 5/6/23), and Medicare AWV (Subsequent; last 5/9/22)    Assessment & Plan   Essential hypertension  Mixed hyperlipidemia  Acquired hypothyroidism  Impaired fasting glucose  Stage 3b chronic kidney disease ([facility] [address]  Moderate protein-calorie malnutrition ([facility] [address]  Peripheral vascular disease ([facility] [address]  Medicare annual wellness visit, subsequent      Recommendations for Preventive Services Due: see orders and patient instructions/AVS.  Recommended screening schedule for the next 5-10 years is provided to the patient in written form: see Patient Instructions/AVS.     No follow-ups on file. Subjective       Patient's complete Health Risk Assessment and screening values have been reviewed and are found in Flowsheets. The following problems were reviewed today and where indicated follow up appointments were made and/or referrals ordered. Positive Risk Factor Screenings with Interventions:    Fall Risk:  Do you feel unsteady or are you worried about falling? : (!) yes (uses cane; needs assistance; left sided weakness)  2 or more falls in past year?: (!) yes  Fall with injury in past year?: no  Timed Up and Go Test > 12 seconds?: (!) yes  Interventions:    Recommended use of walker whenever walking around at home. Uses cane    Cognitive:    Words recalled: 0 Words Recalled   Clock Drawing Test (CDT): Normal   Total Score: (!) 2   Total Score Interpretation: Abnormal Mini-Cog      Interventions:  Patient declines any further evaluation or treatment             Weight and Activity:  Physical Activity: Inactive    Days of Exercise per Week: 0 days    Minutes of Exercise per Session: 0 min     On average, how many days per week do you engage in moderate to strenuous exercise (like a brisk walk)?: 0 days  Have you lost any weight without trying in the past 3 months?:

## 2023-05-09 NOTE — PATIENT INSTRUCTIONS
can help you sleep better. Muscle-strengthening. This type of activity can help maintain muscle and strengthen bones. Includes climbing stairs, using resistance bands, and lifting or carrying heavy loads. Aim for at least twice a week. It can help protect the knees and other joints. Stretching. Stretching gives you better range of motion in joints and muscles. Includes upper arm stretches, calf stretches, and gentle yoga. Aim for at least twice a week, preferably after your muscles are warmed up from other activities. It can help you function better in daily life. Balancing. This helps you stay coordinated and have good posture. Includes heel-to-toe walking, berkley chi, and certain types of yoga. Aim for at least 3 days a week. It can reduce your risk of falling. Even if you have a hard time meeting the recommendations, it's better to be more active than less active. All activity done in each category counts toward your weekly total. You'd be surprised how daily things like carrying groceries, keeping up with grandchildren, and taking the stairs can add up. What keeps you from being active? If you've had a hard time being more active, you're not alone. Maybe you remember being able to do more. Or maybe you've never thought of yourself as being active. It's frustrating when you can't do the things you want. Being more active can help. What's holding you back? Getting started. Have a goal, but break it into easy tasks. Small steps build into big accomplishments. Staying motivated. If you feel like skipping your activity, remember your goal. Maybe you want to move better and stay independent. Every activity gets you one step closer. Not feeling your best.  Start with 5 minutes of an activity you enjoy. Prove to yourself you can do it. As you get comfortable, increase your time. You may not be where you want to be. But you're in the process of getting there. Everyone starts somewhere.   How can you

## 2023-05-09 NOTE — PROGRESS NOTES
2023     Gege Solorzano (:  1935) is a 82 y.o. female, here for evaluation of the following medical concerns:    HPI  Patient comes in today for follow up of chronic health issues Patient does have a known history of hypothyroidism and her thyroid levels are subtherapeutic. Will make an adjustment to her thyroid dose. Does have a known history of hypertension her blood pressure is stable and controlled with her current medical regimen. Has a known history of hyperlipidemia and her cholesterol levels are stable and controlled with her current statin dose. Has a known history of impaired fasting glucose and her blood sugar levels remain stable and controlled with her current dietary intake. She continues on metformin and we had tried to stop this previously as I did not feel it was necessary but as she is not having any issues swallowing it and her blood sugars are remaining stable I do not think it is going to cause any harm with her continuing on this medication. Has a known history of stage IIIb chronic kidney disease and her creatinine remained stable at this time. I believe this was one of the other reasons we were trying to stop the metformin but it has not really changed a lot since she has remained on this medication so we will just continue to monitor. Does have moderate protein calorie malnutrition and has some dysphagia related to a Zenker's diverticulum. Ultimately would just recommend continuing with adequate protein intake. She seems to be maintaining her weight so at this point we will just continue to monitor. Does have known peripheral vascular disease and is not having any further subsequent symptoms or issues related to this. Patient otherwise has no other acute medical concerns. .  Patient's recent lab reports are as follows:    Results for orders placed or performed during the hospital encounter of 23   T4, Free   Result Value Ref Range    Thyroxine, Free 1.3

## 2023-11-04 ENCOUNTER — HOSPITAL ENCOUNTER (OUTPATIENT)
Age: 88
Discharge: HOME | End: 2023-11-04
Payer: MEDICARE

## 2023-11-04 DIAGNOSIS — R73.01 IMPAIRED FASTING GLUCOSE: ICD-10-CM

## 2023-11-04 DIAGNOSIS — I10 ESSENTIAL HYPERTENSION: ICD-10-CM

## 2023-11-04 DIAGNOSIS — E78.2 MIXED HYPERLIPIDEMIA: ICD-10-CM

## 2023-11-04 DIAGNOSIS — E03.9 ACQUIRED HYPOTHYROIDISM: ICD-10-CM

## 2023-11-04 LAB
ALBUMIN SERPL-MCNC: 3.4 G/DL (ref 3.5–5.2)
ALBUMIN/GLOB SERPL: 1 {RATIO} (ref 1–2.5)
ALP SERPL-CCNC: 84 U/L (ref 35–104)
ALT SERPL-CCNC: 19 U/L (ref 5–33)
ANION GAP SERPL CALCULATED.3IONS-SCNC: 5 MMOL/L (ref 9–17)
AST SERPL-CCNC: 37 U/L
BASOPHILS # BLD: 0.07 K/UL (ref 0–0.2)
BASOPHILS NFR BLD: 1 % (ref 0–2)
BILIRUB SERPL-MCNC: 0.3 MG/DL (ref 0.3–1.2)
BUN SERPL-MCNC: 30 MG/DL (ref 8–23)
BUN/CREAT SERPL: 25 (ref 9–20)
CALCIUM SERPL-MCNC: 8.9 MG/DL (ref 8.6–10.4)
CHLORIDE SERPL-SCNC: 111 MMOL/L (ref 98–107)
CHOLEST SERPL-MCNC: 156 MG/DL
CHOLESTEROL/HDL RATIO: 2.7
CO2 SERPL-SCNC: 30 MMOL/L (ref 20–31)
CREAT SERPL-MCNC: 1.2 MG/DL (ref 0.5–0.9)
EOSINOPHIL # BLD: 0.44 K/UL (ref 0–0.44)
EOSINOPHILS RELATIVE PERCENT: 7 % (ref 1–4)
ERYTHROCYTE [DISTWIDTH] IN BLOOD BY AUTOMATED COUNT: 12.7 % (ref 11.8–14.4)
GFR SERPL CREATININE-BSD FRML MDRD: 44 ML/MIN/1.73M2
GLUCOSE SERPL-MCNC: 92 MG/DL (ref 70–99)
HCT VFR BLD AUTO: 38.3 % (ref 36.3–47.1)
HDLC SERPL-MCNC: 57 MG/DL
HGB BLD-MCNC: 12.1 G/DL (ref 11.9–15.1)
IMM GRANULOCYTES # BLD AUTO: 0.04 K/UL (ref 0–0.3)
IMM GRANULOCYTES NFR BLD: 1 %
LDLC SERPL CALC-MCNC: 85 MG/DL (ref 0–130)
LYMPHOCYTES NFR BLD: 1.46 K/UL (ref 1.1–3.7)
LYMPHOCYTES RELATIVE PERCENT: 23 % (ref 24–43)
MCH RBC QN AUTO: 30.3 PG (ref 25.2–33.5)
MCHC RBC AUTO-ENTMCNC: 31.6 G/DL (ref 25.2–33.5)
MCV RBC AUTO: 95.8 FL (ref 82.6–102.9)
MONOCYTES NFR BLD: 0.42 K/UL (ref 0.1–1.2)
MONOCYTES NFR BLD: 7 % (ref 3–12)
NEUTROPHILS NFR BLD: 61 % (ref 36–65)
NEUTS SEG NFR BLD: 3.82 K/UL (ref 1.5–8.1)
NRBC BLD-RTO: 0 PER 100 WBC
PLATELET # BLD AUTO: 245 K/UL (ref 138–453)
PMV BLD AUTO: 9.1 FL (ref 8.1–13.5)
POTASSIUM SERPL-SCNC: 4.8 MMOL/L (ref 3.7–5.3)
PROT SERPL-MCNC: 6.8 G/DL (ref 6.4–8.3)
RBC # BLD AUTO: 4 M/UL (ref 3.95–5.11)
SODIUM SERPL-SCNC: 146 MMOL/L (ref 135–144)
T4 FREE SERPL-MCNC: 1.6 NG/DL (ref 0.9–1.7)
TRIGL SERPL-MCNC: 70 MG/DL
TSH SERPL DL<=0.05 MIU/L-ACNC: 1.23 UIU/ML (ref 0.3–5)
WBC OTHER # BLD: 6.3 K/UL (ref 3.5–11.3)

## 2023-11-04 PROCEDURE — 80061 LIPID PANEL: CPT

## 2023-11-04 PROCEDURE — 85025 COMPLETE CBC W/AUTO DIFF WBC: CPT

## 2023-11-04 PROCEDURE — 36415 COLL VENOUS BLD VENIPUNCTURE: CPT

## 2023-11-04 PROCEDURE — 84439 ASSAY OF FREE THYROXINE: CPT

## 2023-11-04 PROCEDURE — 83036 HEMOGLOBIN GLYCOSYLATED A1C: CPT

## 2023-11-04 PROCEDURE — 80053 COMPREHEN METABOLIC PANEL: CPT

## 2023-11-04 PROCEDURE — 84443 ASSAY THYROID STIM HORMONE: CPT

## 2023-11-05 LAB
EST. AVERAGE GLUCOSE BLD GHB EST-MCNC: 88 MG/DL
HBA1C MFR BLD: 4.7 % (ref 4–6)

## 2023-11-09 ENCOUNTER — IMMUNIZATION (OUTPATIENT)
Dept: LAB | Age: 88
End: 2023-11-09
Payer: MEDICARE

## 2023-11-09 ENCOUNTER — OFFICE VISIT (OUTPATIENT)
Dept: FAMILY MEDICINE CLINIC | Age: 88
End: 2023-11-09
Payer: MEDICARE

## 2023-11-09 VITALS
HEART RATE: 64 BPM | SYSTOLIC BLOOD PRESSURE: 122 MMHG | OXYGEN SATURATION: 99 % | RESPIRATION RATE: 16 BRPM | HEIGHT: 59 IN | WEIGHT: 101 LBS | TEMPERATURE: 98.1 F | BODY MASS INDEX: 20.36 KG/M2 | DIASTOLIC BLOOD PRESSURE: 70 MMHG

## 2023-11-09 DIAGNOSIS — N18.32 STAGE 3B CHRONIC KIDNEY DISEASE (HCC): ICD-10-CM

## 2023-11-09 DIAGNOSIS — I10 ESSENTIAL HYPERTENSION: ICD-10-CM

## 2023-11-09 DIAGNOSIS — Z23 NEED FOR VACCINATION: ICD-10-CM

## 2023-11-09 DIAGNOSIS — E78.2 MIXED HYPERLIPIDEMIA: ICD-10-CM

## 2023-11-09 DIAGNOSIS — H61.23 BILATERAL IMPACTED CERUMEN: ICD-10-CM

## 2023-11-09 DIAGNOSIS — R73.01 IMPAIRED FASTING GLUCOSE: ICD-10-CM

## 2023-11-09 DIAGNOSIS — E03.9 ACQUIRED HYPOTHYROIDISM: Primary | ICD-10-CM

## 2023-11-09 PROCEDURE — 99214 OFFICE O/P EST MOD 30 MIN: CPT | Performed by: FAMILY MEDICINE

## 2023-11-09 PROCEDURE — G8427 DOCREV CUR MEDS BY ELIG CLIN: HCPCS | Performed by: FAMILY MEDICINE

## 2023-11-09 PROCEDURE — 90694 VACC AIIV4 NO PRSRV 0.5ML IM: CPT | Performed by: FAMILY MEDICINE

## 2023-11-09 PROCEDURE — G8484 FLU IMMUNIZE NO ADMIN: HCPCS | Performed by: FAMILY MEDICINE

## 2023-11-09 PROCEDURE — G8420 CALC BMI NORM PARAMETERS: HCPCS | Performed by: FAMILY MEDICINE

## 2023-11-09 PROCEDURE — 1123F ACP DISCUSS/DSCN MKR DOCD: CPT | Performed by: FAMILY MEDICINE

## 2023-11-09 PROCEDURE — 1090F PRES/ABSN URINE INCON ASSESS: CPT | Performed by: FAMILY MEDICINE

## 2023-11-09 PROCEDURE — 91320 SARSCV2 VAC 30MCG TRS-SUC IM: CPT | Performed by: FAMILY MEDICINE

## 2023-11-09 PROCEDURE — 1036F TOBACCO NON-USER: CPT | Performed by: FAMILY MEDICINE

## 2023-11-09 RX ORDER — METOPROLOL TARTRATE 50 MG/1
TABLET, FILM COATED ORAL
Qty: 180 TABLET | Refills: 1 | Status: SHIPPED | OUTPATIENT
Start: 2023-11-09

## 2023-11-09 RX ORDER — OXYBUTYNIN CHLORIDE 10 MG/1
TABLET, EXTENDED RELEASE ORAL
Qty: 90 TABLET | Refills: 1 | Status: SHIPPED | OUTPATIENT
Start: 2023-11-09

## 2023-11-09 RX ORDER — CLOPIDOGREL BISULFATE 75 MG/1
TABLET ORAL
Qty: 90 TABLET | Refills: 1 | Status: SHIPPED | OUTPATIENT
Start: 2023-11-09

## 2023-11-09 RX ORDER — LOVASTATIN 20 MG/1
TABLET ORAL
Qty: 180 TABLET | Refills: 1 | Status: SHIPPED | OUTPATIENT
Start: 2023-11-09

## 2023-11-09 RX ORDER — LISINOPRIL 2.5 MG/1
TABLET ORAL
Qty: 90 TABLET | Refills: 1 | Status: SHIPPED | OUTPATIENT
Start: 2023-11-09

## 2023-11-09 RX ORDER — LEVOTHYROXINE SODIUM 88 UG/1
TABLET ORAL
Qty: 90 TABLET | Refills: 1 | Status: SHIPPED | OUTPATIENT
Start: 2023-11-09

## 2023-11-09 ASSESSMENT — ENCOUNTER SYMPTOMS
SINUS PRESSURE: 0
WHEEZING: 0
CONSTIPATION: 0
DIARRHEA: 0
VOMITING: 0
EYE REDNESS: 0
NAUSEA: 0
RHINORRHEA: 0
SHORTNESS OF BREATH: 0
COUGH: 0
SORE THROAT: 0
ABDOMINAL PAIN: 0
EYE DISCHARGE: 0
TROUBLE SWALLOWING: 0

## 2023-11-09 NOTE — PATIENT INSTRUCTIONS
Hospital Outpatient Visit on 11/04/2023   Component Date Value Ref Range Status    T4 Free 11/04/2023 1.6  0.9 - 1.7 ng/dL Final    TSH 11/04/2023 1.23  0.30 - 5.00 uIU/mL Final    Cholesterol 11/04/2023 156  <200 mg/dL Final    Comment:    Cholesterol Guidelines:      <200  Desirable   200-240  Borderline      >240  Undesirable         HDL 11/04/2023 57  >40 mg/dL Final    Comment:    HDL Guidelines:    <40     Undesirable   40-59    Borderline    >59     Desirable         LDL Cholesterol 11/04/2023 85  0 - 130 mg/dL Final    Comment:    LDL Guidelines:     <100    Desirable   100-129   Near to/above Desirable   130-159   Borderline      >159   Undesirable     Direct (measured) LDL and calculated LDL are not interchangeable tests. Chol/HDL Ratio 11/04/2023 2.7  <5 Final            Triglycerides 11/04/2023 70  <150 mg/dL Final    Comment:    Triglyceride Guidelines:     <150   Desirable   150-199  Borderline   200-499  High     >499   Very high   Based on AHA Guidelines for fasting triglyceride, October 2012. Hemoglobin A1C 11/04/2023 4.7  4.0 - 6.0 % Final    Estimated Avg Glucose 11/04/2023 88  mg/dL Final    Comment: The ADA and AACC recommend providing the estimated average glucose result to permit better   patient understanding of their HBA1c result. Sodium 11/04/2023 146 (H)  135 - 144 mmol/L Final    Potassium 11/04/2023 4.8  3.7 - 5.3 mmol/L Final    Chloride 11/04/2023 111 (H)  98 - 107 mmol/L Final    CO2 11/04/2023 30  20 - 31 mmol/L Final    Anion Gap 11/04/2023 5 (L)  9 - 17 mmol/L Final    Glucose 11/04/2023 92  70 - 99 mg/dL Final    BUN 11/04/2023 30 (H)  8 - 23 mg/dL Final    Creatinine 11/04/2023 1.2 (H)  0.5 - 0.9 mg/dL Final    Est, Glom Filt Rate 11/04/2023 44 (L)  >60 mL/min/1.73m2 Final    Comment:       These results are not intended for use in patients <25years of age. eGFR results are calculated without a race factor using the 2021 CKD-EPI equation.   Careful

## 2023-11-09 NOTE — PROGRESS NOTES
2023     Mehrdad Demarco (:  1935) is a 82 y.o. female, here for evaluation of the following medical concerns:    HPI  Patient comes in today for follow up of chronic health issues Patient overall is relatively stable. Her son notes that he does try to make different meals to increase her caloric intake and her weight is up compared to last check so she is getting adequate intake currently. She does have a known history of hypothyroidism and her thyroid levels are stable and adequately supplemented on her current thyroid dose. Does have a known history of hypertension her blood pressure stable and controlled on her current medical therapy. Has known hyperlipidemia and her cholesterol levels are stable and controlled on her current statin dose. Does have a known history of impaired fasting glucose and her blood sugar level remains stable and controlled with her current dietary regimen. Has known stage IIIb chronic kidney disease and her creatinine is stable at this time. Patient does have known hearing loss and does get frequent cerumen buildup in her ears. Would like to have this checked today. Patient otherwise has no other acute medical concerns. .  Patient's recent lab reports are as follows:    Results for orders placed or performed during the hospital encounter of 23   T4, Free   Result Value Ref Range    T4 Free 1.6 0.9 - 1.7 ng/dL   TSH   Result Value Ref Range    TSH 1.23 0.30 - 5.00 uIU/mL   Lipid Panel   Result Value Ref Range    Cholesterol 156 <200 mg/dL    HDL 57 >40 mg/dL    LDL Cholesterol 85 0 - 130 mg/dL    Chol/HDL Ratio 2.7 <5    Triglycerides 70 <150 mg/dL   Hemoglobin A1C   Result Value Ref Range    Hemoglobin A1C 4.7 4.0 - 6.0 %    Estimated Avg Glucose 88 mg/dL   Comprehensive Metabolic Panel   Result Value Ref Range    Sodium 146 (H) 135 - 144 mmol/L    Potassium 4.8 3.7 - 5.3 mmol/L    Chloride 111 (H) 98 - 107 mmol/L    CO2 30 20 - 31 mmol/L    Anion Gap 5

## 2024-05-06 ENCOUNTER — HOSPITAL ENCOUNTER (OUTPATIENT)
Age: 89
Discharge: HOME | End: 2024-05-06
Payer: MEDICARE

## 2024-05-06 DIAGNOSIS — E03.9 ACQUIRED HYPOTHYROIDISM: ICD-10-CM

## 2024-05-06 DIAGNOSIS — E78.2 MIXED HYPERLIPIDEMIA: ICD-10-CM

## 2024-05-06 DIAGNOSIS — I10 ESSENTIAL HYPERTENSION: ICD-10-CM

## 2024-05-06 DIAGNOSIS — R73.01 IMPAIRED FASTING GLUCOSE: ICD-10-CM

## 2024-05-06 LAB
ALBUMIN SERPL-MCNC: 3.1 G/DL (ref 3.5–5.2)
ALBUMIN/GLOB SERPL: 1 {RATIO} (ref 1–2.5)
ALP SERPL-CCNC: 80 U/L (ref 35–104)
ALT SERPL-CCNC: 18 U/L (ref 5–33)
ANION GAP SERPL CALCULATED.3IONS-SCNC: 7 MMOL/L (ref 9–17)
BASOPHILS # BLD: 0.08 K/UL (ref 0–0.2)
BASOPHILS NFR BLD: 1 % (ref 0–2)
BILIRUB SERPL-MCNC: 0.3 MG/DL (ref 0.3–1.2)
BUN SERPL-MCNC: 25 MG/DL (ref 8–23)
BUN/CREAT SERPL: 21 (ref 9–20)
CALCIUM SERPL-MCNC: 8.9 MG/DL (ref 8.6–10.4)
CHLORIDE SERPL-SCNC: 105 MMOL/L (ref 98–107)
CHOLEST SERPL-MCNC: 121 MG/DL (ref 0–199)
CHOLESTEROL/HDL RATIO: 2
CO2 SERPL-SCNC: 29 MMOL/L (ref 20–31)
CREAT SERPL-MCNC: 1.2 MG/DL (ref 0.5–0.9)
EOSINOPHIL # BLD: 0.36 K/UL (ref 0–0.44)
EOSINOPHILS RELATIVE PERCENT: 5 % (ref 1–4)
ERYTHROCYTE [DISTWIDTH] IN BLOOD BY AUTOMATED COUNT: 13.7 % (ref 11.8–14.4)
EST. AVERAGE GLUCOSE BLD GHB EST-MCNC: 91 MG/DL
GFR, ESTIMATED: 44 ML/MIN/1.73M2
GLUCOSE SERPL-MCNC: 86 MG/DL (ref 70–99)
HBA1C MFR BLD: 4.8 % (ref 4–6)
HCT VFR BLD AUTO: 36.9 % (ref 36.3–47.1)
HDLC SERPL-MCNC: 51 MG/DL
HGB BLD-MCNC: 11.7 G/DL (ref 11.9–15.1)
IMM GRANULOCYTES # BLD AUTO: 0.04 K/UL (ref 0–0.3)
IMM GRANULOCYTES NFR BLD: 1 %
LDLC SERPL CALC-MCNC: 56 MG/DL (ref 0–100)
LYMPHOCYTES NFR BLD: 1.51 K/UL (ref 1.1–3.7)
LYMPHOCYTES RELATIVE PERCENT: 21 % (ref 24–43)
MCH RBC QN AUTO: 29 PG (ref 25.2–33.5)
MCHC RBC AUTO-ENTMCNC: 31.7 G/DL (ref 25.2–33.5)
MCV RBC AUTO: 91.3 FL (ref 82.6–102.9)
MONOCYTES NFR BLD: 0.44 K/UL (ref 0.1–1.2)
MONOCYTES NFR BLD: 6 % (ref 3–12)
NEUTROPHILS NFR BLD: 66 % (ref 36–65)
NEUTS SEG NFR BLD: 4.71 K/UL (ref 1.5–8.1)
NRBC BLD-RTO: 0 PER 100 WBC
PLATELET # BLD AUTO: 245 K/UL (ref 138–453)
PMV BLD AUTO: 9.2 FL (ref 8.1–13.5)
POTASSIUM SERPL-SCNC: 4.6 MMOL/L (ref 3.7–5.3)
PROT SERPL-MCNC: 6.2 G/DL (ref 6.4–8.3)
RBC # BLD AUTO: 4.04 M/UL (ref 3.95–5.11)
SODIUM SERPL-SCNC: 141 MMOL/L (ref 135–144)
T4 FREE SERPL-MCNC: 1.3 NG/DL (ref 0.92–1.68)
TRIGL SERPL-MCNC: 69 MG/DL
TSH SERPL DL<=0.05 MIU/L-ACNC: 4.78 UIU/ML (ref 0.3–5)
VLDLC SERPL CALC-MCNC: 14 MG/DL
WBC OTHER # BLD: 7.1 K/UL (ref 3.5–11.3)

## 2024-05-06 PROCEDURE — 83036 HEMOGLOBIN GLYCOSYLATED A1C: CPT

## 2024-05-06 PROCEDURE — 80053 COMPREHEN METABOLIC PANEL: CPT

## 2024-05-06 PROCEDURE — 84443 ASSAY THYROID STIM HORMONE: CPT

## 2024-05-06 PROCEDURE — 85025 COMPLETE CBC W/AUTO DIFF WBC: CPT

## 2024-05-06 PROCEDURE — 80061 LIPID PANEL: CPT

## 2024-05-06 PROCEDURE — 84439 ASSAY OF FREE THYROXINE: CPT

## 2024-05-06 PROCEDURE — 36415 COLL VENOUS BLD VENIPUNCTURE: CPT

## 2024-05-09 ENCOUNTER — OFFICE VISIT (OUTPATIENT)
Dept: FAMILY MEDICINE CLINIC | Age: 89
End: 2024-05-09

## 2024-05-09 VITALS
RESPIRATION RATE: 18 BRPM | SYSTOLIC BLOOD PRESSURE: 106 MMHG | OXYGEN SATURATION: 94 % | BODY MASS INDEX: 17.54 KG/M2 | DIASTOLIC BLOOD PRESSURE: 70 MMHG | HEIGHT: 59 IN | TEMPERATURE: 97.3 F | WEIGHT: 87 LBS | HEART RATE: 52 BPM

## 2024-05-09 DIAGNOSIS — Z00.00 MEDICARE ANNUAL WELLNESS VISIT, SUBSEQUENT: ICD-10-CM

## 2024-05-09 DIAGNOSIS — E03.9 ACQUIRED HYPOTHYROIDISM: ICD-10-CM

## 2024-05-09 DIAGNOSIS — E44.0 MODERATE PROTEIN-CALORIE MALNUTRITION (HCC): Primary | ICD-10-CM

## 2024-05-09 DIAGNOSIS — I73.9 PERIPHERAL VASCULAR DISEASE (HCC): ICD-10-CM

## 2024-05-09 DIAGNOSIS — K22.5 ZENKER'S DIVERTICULUM: ICD-10-CM

## 2024-05-09 DIAGNOSIS — I10 ESSENTIAL HYPERTENSION: ICD-10-CM

## 2024-05-09 DIAGNOSIS — E78.2 MIXED HYPERLIPIDEMIA: ICD-10-CM

## 2024-05-09 DIAGNOSIS — N18.32 STAGE 3B CHRONIC KIDNEY DISEASE (HCC): ICD-10-CM

## 2024-05-09 DIAGNOSIS — R73.01 IMPAIRED FASTING GLUCOSE: ICD-10-CM

## 2024-05-09 RX ORDER — LEVOTHYROXINE SODIUM 88 UG/1
TABLET ORAL
Qty: 90 TABLET | Refills: 1 | Status: SHIPPED | OUTPATIENT
Start: 2024-05-09

## 2024-05-09 RX ORDER — CLOPIDOGREL BISULFATE 75 MG/1
TABLET ORAL
Qty: 90 TABLET | Refills: 1 | Status: SHIPPED | OUTPATIENT
Start: 2024-05-09

## 2024-05-09 RX ORDER — LOVASTATIN 20 MG/1
TABLET ORAL
Qty: 180 TABLET | Refills: 1 | Status: SHIPPED | OUTPATIENT
Start: 2024-05-09

## 2024-05-09 RX ORDER — OXYBUTYNIN CHLORIDE 10 MG/1
TABLET, EXTENDED RELEASE ORAL
Qty: 90 TABLET | Refills: 1 | Status: SHIPPED | OUTPATIENT
Start: 2024-05-09

## 2024-05-09 RX ORDER — METOPROLOL TARTRATE 50 MG/1
TABLET, FILM COATED ORAL
Qty: 180 TABLET | Refills: 1 | Status: SHIPPED | OUTPATIENT
Start: 2024-05-09

## 2024-05-09 RX ORDER — LISINOPRIL 2.5 MG/1
TABLET ORAL
Qty: 90 TABLET | Refills: 1 | Status: SHIPPED | OUTPATIENT
Start: 2024-05-09

## 2024-05-09 SDOH — ECONOMIC STABILITY: FOOD INSECURITY: WITHIN THE PAST 12 MONTHS, THE FOOD YOU BOUGHT JUST DIDN'T LAST AND YOU DIDN'T HAVE MONEY TO GET MORE.: NEVER TRUE

## 2024-05-09 SDOH — ECONOMIC STABILITY: INCOME INSECURITY: HOW HARD IS IT FOR YOU TO PAY FOR THE VERY BASICS LIKE FOOD, HOUSING, MEDICAL CARE, AND HEATING?: NOT VERY HARD

## 2024-05-09 SDOH — ECONOMIC STABILITY: FOOD INSECURITY: WITHIN THE PAST 12 MONTHS, YOU WORRIED THAT YOUR FOOD WOULD RUN OUT BEFORE YOU GOT MONEY TO BUY MORE.: NEVER TRUE

## 2024-05-09 ASSESSMENT — ENCOUNTER SYMPTOMS
COUGH: 0
SORE THROAT: 0
NAUSEA: 0
VOMITING: 0
EYE DISCHARGE: 0
SHORTNESS OF BREATH: 0
CONSTIPATION: 0
WHEEZING: 0
ABDOMINAL PAIN: 0
RHINORRHEA: 0
SINUS PRESSURE: 0
DIARRHEA: 0
TROUBLE SWALLOWING: 0
EYE REDNESS: 0

## 2024-05-09 ASSESSMENT — PATIENT HEALTH QUESTIONNAIRE - PHQ9
SUM OF ALL RESPONSES TO PHQ QUESTIONS 1-9: 0
2. FEELING DOWN, DEPRESSED OR HOPELESS: NOT AT ALL
SUM OF ALL RESPONSES TO PHQ QUESTIONS 1-9: 0
1. LITTLE INTEREST OR PLEASURE IN DOING THINGS: NOT AT ALL
SUM OF ALL RESPONSES TO PHQ QUESTIONS 1-9: 0
SUM OF ALL RESPONSES TO PHQ9 QUESTIONS 1 & 2: 0
SUM OF ALL RESPONSES TO PHQ QUESTIONS 1-9: 0

## 2024-05-09 ASSESSMENT — LIFESTYLE VARIABLES
HOW MANY STANDARD DRINKS CONTAINING ALCOHOL DO YOU HAVE ON A TYPICAL DAY: PATIENT DOES NOT DRINK
HOW OFTEN DO YOU HAVE A DRINK CONTAINING ALCOHOL: NEVER

## 2024-05-09 NOTE — PROGRESS NOTES
2024     Princess Shultz (:  1935) is a 89 y.o. female, here for evaluation of the following medical concerns:    Hypertension  Pertinent negatives include no chest pain, headaches, neck pain or shortness of breath.   Hyperlipidemia  Pertinent negatives include no chest pain, myalgias or shortness of breath.   Weight Loss  Pertinent negatives include no abdominal pain, arthralgias, chest pain, chills, congestion, coughing, fatigue, fever, headaches, myalgias, nausea, neck pain, rash, sore throat, vomiting or weakness.     Patient comes in today for follow up of chronic health issues Patient continues to lose weight and son notes that it is becoming more more difficult for her to swallow certain things.  She does have a known Zenker's diverticulum and this has caused her to have difficulty with swallowing and has been through speech therapy and only does primarily puréed foods but even with that it started to become more more difficult.  I did discuss that we can get her into a dietitian to see if they have further suggestions.  Also offered the suggestion that if they really struggle we could consider putting in a feeding tube but her son notes he would have to discuss it with his sister as far as whether or not they would want to pursue this.  We did talk about adding protein supplements in powdered form to some of the puréed foods that she eats just to try to give her a little bit more protein in her diet and additional calories.  Ultimately he will continue to try to work on nutritional intake at home and discussed with his sister if they would like to pursue further intervention.  Has a known history of hypothyroidism and her thyroid levels are stable and adequately supplemented with her current thyroid dose.  Does have a known history of hypertension and her blood pressure is stable and controlled with her current medical therapy.  Has a known history of hyperlipidemia and her cholesterol

## 2024-05-09 NOTE — PROGRESS NOTES
Medicare Annual Wellness Visit    Princess Shultz is here for Medicare AWV (Subsequent; last 5/9/23), Hypertension (With CKD; 6 mo f/u), Hyperlipidemia, Blood Sugar Problem (IFG), Hypothyroidism, Discuss Labs (Drawn 5/6/24), and Weight Loss (Is basically on liquid diet; son adds broth to pureed foods; has been eating; has had weight loss)    Assessment & Plan   Acquired hypothyroidism  Essential hypertension  Mixed hyperlipidemia  Impaired fasting glucose  Moderate protein-calorie malnutrition (HCC)  Peripheral vascular disease (HCC)  Stage 3b chronic kidney disease (HCC)  Medicare annual wellness visit, subsequent    Recommendations for Preventive Services Due: see orders and patient instructions/AVS.  Recommended screening schedule for the next 5-10 years is provided to the patient in written form: see Patient Instructions/AVS.     No follow-ups on file.     Subjective       Patient's complete Health Risk Assessment and screening values have been reviewed and are found in Flowsheets. The following problems were reviewed today and where indicated follow up appointments were made and/or referrals ordered.    Positive Risk Factor Screenings with Interventions:                Activity, Diet, and Weight:  On average, how many days per week do you engage in moderate to strenuous exercise (like a brisk walk)?: 0 days  On average, how many minutes do you engage in exercise at this level?: 0 min    Do you eat balanced/healthy meals regularly?: Yes    Body mass index is 17.57 kg/m². (!) Abnormal      Inactivity Interventions:  Limited mobility  Underweight Interventions:  Trying to add additional sources of protein and calories to her diet, but patient has significant issues with dysphagia          Vision Screen:  Do you have difficulty driving, watching TV, or doing any of your daily activities because of your eyesight?: No  Have you had an eye exam within the past year?: (!) No  No results found.    Interventions:

## 2024-05-09 NOTE — PATIENT INSTRUCTIONS
are included within your After Visit Summary for your review.    Other Preventive Recommendations:    A preventive eye exam performed by an eye specialist is recommended every 1-2 years to screen for glaucoma; cataracts, macular degeneration, and other eye disorders.  A preventive dental visit is recommended every 6 months.  Try to get at least 150 minutes of exercise per week or 10,000 steps per day on a pedometer .  Order or download the FREE \"Exercise & Physical Activity: Your Everyday Guide\" from [employer]. Call 1-766.430.1088 or search [employer] online.  You need 8141-8670 mg of calcium and 0607-9123 IU of vitamin D per day. It is possible to meet your calcium requirement with diet alone, but a vitamin D supplement is usually necessary to meet this goal.  When exposed to the sun, use a sunscreen that protects against both UVA and UVB radiation with an SPF of 30 or greater. Reapply every 2 to 3 hours or after sweating, drying off with a towel, or swimming.  Always wear a seat belt when traveling in a car. Always wear a helmet when riding a bicycle or motorcycle.

## 2024-10-21 ENCOUNTER — TELEPHONE (OUTPATIENT)
Dept: FAMILY MEDICINE CLINIC | Age: 89
End: 2024-10-21

## 2024-10-21 NOTE — TELEPHONE ENCOUNTER
FYI- Patient is in the [facility] and is getting a feeding tube put in tomorrow per her son. He was just calling to give an update. She will either go to a nursing home or go home with home health care. Pts son says he is going to learn to do the tube feeding so he can care for his mom at home eventually.

## 2024-10-21 NOTE — TELEPHONE ENCOUNTER
Patient's son is calling stating that the patient is getting her feeding tube.  If you have questions for the son her room number is 646 [address] and the phone number for the room is [phone]

## 2024-11-20 ENCOUNTER — TELEPHONE (OUTPATIENT)
Dept: FAMILY MEDICINE CLINIC | Age: 89
End: 2024-11-20

## 2024-11-20 NOTE — TELEPHONE ENCOUNTER
Patient is currently with [facility]. Advised him Reglan could be used for a number of things such as nausea,vomitting, acid reflux, and slowing emptying of the stomach. Advised he ask the hospice nurse which one of these she is getting this for. Patient states understanding.

## 2024-11-20 NOTE — TELEPHONE ENCOUNTER
is calling as he's confused and would like some guidance, as pt is home and hospice told them they wanted pt on Metoclopramide but have not given them a script and  tried to talk to them about this and is getting no answers.    actually questions how important is this med at this time, please advise on above number.

## 2025-01-01 ENCOUNTER — TELEPHONE (OUTPATIENT)
Dept: FAMILY MEDICINE CLINIC | Age: 89
End: 2025-01-01

## 2025-05-22 NOTE — TELEPHONE ENCOUNTER
Lester calling to let you know that pt passed today but he wants to talk to the nurse personally, please call.